# Patient Record
Sex: MALE | Race: WHITE | NOT HISPANIC OR LATINO | Employment: OTHER | URBAN - METROPOLITAN AREA
[De-identification: names, ages, dates, MRNs, and addresses within clinical notes are randomized per-mention and may not be internally consistent; named-entity substitution may affect disease eponyms.]

---

## 2017-01-16 DIAGNOSIS — I10 BENIGN ESSENTIAL HYPERTENSION: ICD-10-CM

## 2017-01-16 DIAGNOSIS — E78.00 HYPERCHOLESTEROLEMIA: ICD-10-CM

## 2017-01-16 LAB
ALT SERPL W P-5'-P-CCNC: 47 U/L (ref 0–70)
ANION GAP SERPL CALCULATED.3IONS-SCNC: 9 MMOL/L (ref 3–14)
BUN SERPL-MCNC: 22 MG/DL (ref 7–30)
CALCIUM SERPL-MCNC: 9 MG/DL (ref 8.5–10.1)
CHLORIDE SERPL-SCNC: 100 MMOL/L (ref 94–109)
CHOLEST SERPL-MCNC: 193 MG/DL
CO2 SERPL-SCNC: 30 MMOL/L (ref 20–32)
CREAT SERPL-MCNC: 1.38 MG/DL (ref 0.66–1.25)
GFR SERPL CREATININE-BSD FRML MDRD: 51 ML/MIN/1.7M2
GLUCOSE SERPL-MCNC: 98 MG/DL (ref 70–99)
HDLC SERPL-MCNC: 50 MG/DL
LDLC SERPL CALC-MCNC: 103 MG/DL
NONHDLC SERPL-MCNC: 143 MG/DL
POTASSIUM SERPL-SCNC: 3.8 MMOL/L (ref 3.4–5.3)
SODIUM SERPL-SCNC: 139 MMOL/L (ref 133–144)
TRIGL SERPL-MCNC: 198 MG/DL

## 2017-01-16 PROCEDURE — 36415 COLL VENOUS BLD VENIPUNCTURE: CPT | Performed by: FAMILY MEDICINE

## 2017-01-16 PROCEDURE — 84460 ALANINE AMINO (ALT) (SGPT): CPT | Performed by: FAMILY MEDICINE

## 2017-01-16 PROCEDURE — 80061 LIPID PANEL: CPT | Performed by: FAMILY MEDICINE

## 2017-01-16 PROCEDURE — 80048 BASIC METABOLIC PNL TOTAL CA: CPT | Performed by: FAMILY MEDICINE

## 2017-01-19 ENCOUNTER — OFFICE VISIT (OUTPATIENT)
Dept: FAMILY MEDICINE | Facility: OTHER | Age: 72
End: 2017-01-19
Attending: FAMILY MEDICINE
Payer: COMMERCIAL

## 2017-01-19 VITALS
TEMPERATURE: 97.2 F | BODY MASS INDEX: 24.64 KG/M2 | RESPIRATION RATE: 14 BRPM | SYSTOLIC BLOOD PRESSURE: 142 MMHG | HEART RATE: 76 BPM | HEIGHT: 67 IN | WEIGHT: 157 LBS | DIASTOLIC BLOOD PRESSURE: 80 MMHG

## 2017-01-19 DIAGNOSIS — E78.00 HYPERCHOLESTEROLEMIA: ICD-10-CM

## 2017-01-19 DIAGNOSIS — Z23 NEED FOR VACCINATION WITH 13-POLYVALENT PNEUMOCOCCAL CONJUGATE VACCINE: ICD-10-CM

## 2017-01-19 DIAGNOSIS — Z12.11 COLON CANCER SCREENING: ICD-10-CM

## 2017-01-19 DIAGNOSIS — I10 BENIGN ESSENTIAL HYPERTENSION: Primary | ICD-10-CM

## 2017-01-19 PROCEDURE — 99213 OFFICE O/P EST LOW 20 MIN: CPT | Performed by: FAMILY MEDICINE

## 2017-01-19 PROCEDURE — 99212 OFFICE O/P EST SF 10 MIN: CPT | Mod: 25

## 2017-01-19 PROCEDURE — 90670 PCV13 VACCINE IM: CPT | Performed by: FAMILY MEDICINE

## 2017-01-19 PROCEDURE — G0009 ADMIN PNEUMOCOCCAL VACCINE: HCPCS | Performed by: FAMILY MEDICINE

## 2017-01-19 ASSESSMENT — ANXIETY QUESTIONNAIRES

## 2017-01-19 ASSESSMENT — PATIENT HEALTH QUESTIONNAIRE - PHQ9: 5. POOR APPETITE OR OVEREATING: NOT AT ALL

## 2017-01-19 NOTE — PROGRESS NOTES
SUBJECTIVE:                                                    Ten Flores is a 71 year old male who presents to clinic today for the following health issues:      Hyperlipidemia Follow-Up      Rate your low fat/cholesterol diet?: good    Taking statin?  Yes, no muscle aches from statin    Other lipid medications/supplements?:  none     Hypertension Follow-up      Outpatient blood pressures are not being checked.    Low Salt Diet: no added salt       Amount of exercise or physical activity: generally active, through less during the really cold days    Problems taking medications regularly: No    Medication side effects: none    Diet: low salt      Problem list and histories reviewed & adjusted, as indicated.  Additional history: as documented    Patient Active Problem List   Diagnosis     Advanced care planning/counseling discussion     Benign essential hypertension     Hypercholesterolemia     Hip pain, right     Chronic rhinitis     Past Surgical History   Procedure Laterality Date     Open reduction and internal  fixation > left       Transplant       Genitourinary surgery       circ     Dermatology ip consult         Social History   Substance Use Topics     Smoking status: Former Smoker -- 5 years     Types: Cigarettes     Smokeless tobacco: Former User      Comment: Tried to Quit (YES)     Alcohol Use: 0.0 oz/week      Comment: DAILY,wine,beer     Family History   Problem Relation Age of Onset     HEART DISEASE Father 89     Disease - Cause of Death     HEART DISEASE Mother 68     Disease - Cause of Death         Current Outpatient Prescriptions   Medication Sig Dispense Refill     triamterene-hydrochlorothiazide (DYAZIDE) 37.5-25 MG per capsule Take 1 capsule by mouth daily 90 capsule 3     simvastatin (ZOCOR) 40 MG tablet Take 1 tablet (40 mg) by mouth At Bedtime 90 tablet 3     fluticasone (FLONASE) 50 MCG/ACT nasal spray Spray 1-2 sprays into both nostrils daily 16 g 11     acetaminophen (TYLENOL) 500  "MG tablet Take 2 tablets (1,000 mg) by mouth every 8 hours as needed 100 tablet 1     aspirin 81 MG tablet Take by mouth daily       HYDROcodone-acetaminophen (NORCO) 5-325 MG per tablet Take 1 tablet by mouth every 6 hours as needed for moderate to severe pain 10 tablet 0     fish oil-omega-3 fatty acids (FISH OIL) 1000 MG capsule Take 2 g by mouth daily        Allergies   Allergen Reactions     Sulfasuccinamide Sodium Hives     Sulfa Drugs Rash       ROS:  Constitutional, HEENT, cardiovascular, pulmonary, gi and gu systems are negative, except as otherwise noted.    OBJECTIVE:                                                    /80 mmHg  Pulse 76  Temp(Src) 97.2  F (36.2  C) (Tympanic)  Resp 14  Ht 5' 7\" (1.702 m)  Wt 157 lb (71.215 kg)  BMI 24.58 kg/m2  Body mass index is 24.58 kg/(m^2).  GENERAL: healthy, alert and no distress  PSYCH: mentation appears normal, affect normal/bright    Diagnostic Test Results:  Results for orders placed or performed in visit on 01/16/17   Basic metabolic panel   Result Value Ref Range    Sodium 139 133 - 144 mmol/L    Potassium 3.8 3.4 - 5.3 mmol/L    Chloride 100 94 - 109 mmol/L    Carbon Dioxide 30 20 - 32 mmol/L    Anion Gap 9 3 - 14 mmol/L    Glucose 98 70 - 99 mg/dL    Urea Nitrogen 22 7 - 30 mg/dL    Creatinine 1.38 (H) 0.66 - 1.25 mg/dL    GFR Estimate 51 (L) >60 mL/min/1.7m2    GFR Estimate If Black 61 >60 mL/min/1.7m2    Calcium 9.0 8.5 - 10.1 mg/dL   Lipid Profile   Result Value Ref Range    Cholesterol 193 <200 mg/dL    Triglycerides 198 (H) <150 mg/dL    HDL Cholesterol 50 >39 mg/dL    LDL Cholesterol Calculated 103 (H) <100 mg/dL    Non HDL Cholesterol 143 (H) <130 mg/dL   ALT   Result Value Ref Range    ALT 47 0 - 70 U/L        ASSESSMENT/PLAN:                                                    Hyperlipidemia; controlled   Plan:  No changes in the patient's current treatment plan    Hypertension; controlled   Associated with the following " complications:    None   Plan:  No changes in the patient's current treatment plan          ICD-10-CM    1. Benign essential hypertension I10    2. Hypercholesterolemia E78.00    3. Colon cancer screening Z12.11 Immunos occult blood   4. Need for vaccination with 13-polyvalent pneumococcal conjugate vaccine Z23 PNEUMOCOCCAL CONJ VACCINE 13 VALENT IM (PREVNAR 13)     ADMIN 1st VACCINE       FUTURE APPOINTMENTS:       - Follow-up visit in 6 months, labs prior to visit      Hodan Tapia MD  [facility]

## 2017-01-19 NOTE — NURSING NOTE
"Chief Complaint   Patient presents with     Recheck Medication     Patient had his labs drawn a couple of days ago.     Other     Patient would like a referral to ENT.       Initial /94 mmHg  Pulse 76  Temp(Src) 97.2  F (36.2  C) (Tympanic)  Resp 14  Ht 5' 7\" (1.702 m)  Wt 157 lb (71.215 kg)  BMI 24.58 kg/m2 Estimated body mass index is 24.58 kg/(m^2) as calculated from the following:    Height as of this encounter: 5' 7\" (1.702 m).    Weight as of this encounter: 157 lb (71.215 kg).  BP completed using cuff size: faisal Pal      "

## 2017-01-19 NOTE — MR AVS SNAPSHOT
After Visit Summary   1/19/2017    Ten Flores    MRN: [id number]           Patient Information     Date Of Birth          1945        Visit Information        Provider Department      1/19/2017 9:45 AM Hodan Tapia MD [facility]        Today's Diagnoses     Benign essential hypertension    -  1     Hypercholesterolemia         Colon cancer screening         Need for vaccination with 13-polyvalent pneumococcal conjugate vaccine            Follow-ups after your visit        Follow-up notes from your care team     Return in about 6 months (around 7/19/2017).      Your next 10 appointments already scheduled     Jul 20, 2017  9:45 AM   (Arrive by 9:30 AM)   SHORT with Hodan Tapia MD   [facility] ([facility] )    [address] [address]   [phone]              Future tests that were ordered for you today     Open Future Orders        Priority Expected Expires Ordered    Basic metabolic panel Routine 7/19/2017 1/19/2018 1/19/2017    Lipid Profile Routine 7/19/2017 1/19/2018 1/19/2017    ALT Routine 7/19/2017 1/19/2018 1/19/2017    Immunos occult blood Routine  1/19/2018 1/19/2017            Who to contact     If you have questions or need follow up information about today's clinic visit or your schedule please contact [facility] directly at [phone].  Normal or non-critical lab and imaging results will be communicated to you by MyChart, letter or phone within 4 business days after the clinic has received the results. If you do not hear from us within 7 days, please contact the clinic through MyChart or phone. If you have a critical or abnormal lab result, we will notify you by phone as soon as possible.  Submit refill requests through [employer] or call your pharmacy and they will forward the refill request to us. Please allow 3 business days for your refill to be completed.          Additional  "Information About Your Visit        MyChart Information     [employer] lets you send messages to your doctor, view your test results, renew your prescriptions, schedule appointments and more. To sign up, go to [email or web address] . Click on \"Log in\" on the left side of the screen, which will take you to the Welcome page. Then click on \"Sign up Now\" on the right side of the page.     You will be asked to enter the access code listed below, as well as some personal information. Please follow the directions to create your username and password.     Your access code is: [email or web address]  Expires: 2017 10:34 AM     Your access code will  in 90 days. If you need help or a new code, please call your [place] clinic or [phone].        Care EveryWhere ID     This is your Care EveryWhere ID. This could be used by other organizations to access your [place] medical records  [id number]        Your Vitals Were     Pulse Temperature Respirations Height BMI (Body Mass Index)       76 97.2  F (36.2  C) (Tympanic) 14 5' 7\" (1.702 m) 24.58 kg/m2        Blood Pressure from Last 3 Encounters:   17 142/80   10/04/16 140/84   16 148/80    Weight from Last 3 Encounters:   17 157 lb (71.215 kg)   10/04/16 160 lb 9.6 oz (72.848 kg)   16 167 lb (75.751 kg)              We Performed the Following     ADMIN 1st VACCINE     PNEUMOCOCCAL CONJ VACCINE 13 VALENT IM (PREVNAR 13)        Primary Care Provider Office Phone # Fax #    Hodan Tapia [phone]       [facility] [address] [place] [address]        Thank you!     Thank you for choosing [facility]  for your care. Our goal is always to provide you with excellent care. Hearing back from our patients is one way we can continue to improve our services. Please take a few minutes to complete the written survey that you may receive in the mail after your visit with us. Thank you!           "   Your Updated Medication List - Protect others around you: Learn how to safely use, store and throw away your medicines at www.disposemymeds.org.          This list is accurate as of: 1/19/17 10:34 AM.  Always use your most recent med list.                   Brand Name Dispense Instructions for use    acetaminophen 500 MG tablet    TYLENOL    100 tablet    Take 2 tablets (1,000 mg) by mouth every 8 hours as needed       aspirin 81 MG tablet      Take by mouth daily       fish oil-omega-3 fatty acids 1000 MG capsule      Take 2 g by mouth daily       fluticasone 50 MCG/ACT spray    FLONASE    16 g    Spray 1-2 sprays into both nostrils daily       HYDROcodone-acetaminophen 5-325 MG per tablet    NORCO    10 tablet    Take 1 tablet by mouth every 6 hours as needed for moderate to severe pain       simvastatin 40 MG tablet    ZOCOR    90 tablet    Take 1 tablet (40 mg) by mouth At Bedtime       triamterene-hydrochlorothiazide 37.5-25 MG per capsule    DYAZIDE    90 capsule    Take 1 capsule by mouth daily

## 2017-01-20 ASSESSMENT — ANXIETY QUESTIONNAIRES: GAD7 TOTAL SCORE: 0

## 2017-01-20 ASSESSMENT — PATIENT HEALTH QUESTIONNAIRE - PHQ9: SUM OF ALL RESPONSES TO PHQ QUESTIONS 1-9: 0

## 2017-01-23 ENCOUNTER — TELEPHONE (OUTPATIENT)
Dept: FAMILY MEDICINE | Facility: OTHER | Age: 72
End: 2017-01-23

## 2017-01-23 DIAGNOSIS — Z12.11 COLON CANCER SCREENING: ICD-10-CM

## 2017-01-23 DIAGNOSIS — J32.9 CHRONIC SINUSITIS, UNSPECIFIED LOCATION: Primary | ICD-10-CM

## 2017-01-23 LAB — HEMOCCULT SP1 STL QL: NEGATIVE

## 2017-01-23 PROCEDURE — 82274 ASSAY TEST FOR BLOOD FECAL: CPT | Performed by: FAMILY MEDICINE

## 2017-01-23 NOTE — TELEPHONE ENCOUNTER
Reason for call:  REFERRAL   1. Concern:  Dr Tapia was suppose to refer to ENT, he has not heard anything  2. Have you seen a provider for this concern? Yes  3. Who?   Dr. Tapia  4. When?  Last Week   5. What services are you requesting?   Referral to Ent  Description: Was Seen on the 19th, has not heard anything regarding an appt with ENT  Was an appointment offered for this a call? No  Preferred method for responding to this message: Telephone Call @ [phone]  If we cannot reach you directly, may we leave a detailed response at the number you provided? Yes  Can this message wait until your PCP/Provider returns if not available today?  Yes

## 2017-02-23 ENCOUNTER — OFFICE VISIT (OUTPATIENT)
Dept: OTOLARYNGOLOGY | Facility: OTHER | Age: 72
End: 2017-02-23
Attending: FAMILY MEDICINE
Payer: COMMERCIAL

## 2017-02-23 VITALS
HEIGHT: 67 IN | SYSTOLIC BLOOD PRESSURE: 142 MMHG | DIASTOLIC BLOOD PRESSURE: 76 MMHG | TEMPERATURE: 97.6 F | WEIGHT: 155 LBS | OXYGEN SATURATION: 97 % | HEART RATE: 62 BPM | BODY MASS INDEX: 24.33 KG/M2

## 2017-02-23 DIAGNOSIS — J31.0 CHRONIC RHINITIS: ICD-10-CM

## 2017-02-23 DIAGNOSIS — H93.13 TINNITUS, BILATERAL: ICD-10-CM

## 2017-02-23 DIAGNOSIS — J34.2 DNS (DEVIATED NASAL SEPTUM): ICD-10-CM

## 2017-02-23 DIAGNOSIS — H90.5 SNHL (SENSORINEURAL HEARING LOSS): Primary | ICD-10-CM

## 2017-02-23 DIAGNOSIS — F17.220 CHEWING TOBACCO NICOTINE DEPENDENCE WITHOUT COMPLICATION: ICD-10-CM

## 2017-02-23 DIAGNOSIS — J34.3 NASAL TURBINATE HYPERTROPHY: ICD-10-CM

## 2017-02-23 PROCEDURE — 31231 NASAL ENDOSCOPY DX: CPT | Performed by: OTOLARYNGOLOGY

## 2017-02-23 PROCEDURE — 99203 OFFICE O/P NEW LOW 30 MIN: CPT

## 2017-02-23 PROCEDURE — 99204 OFFICE O/P NEW MOD 45 MIN: CPT | Mod: 25 | Performed by: OTOLARYNGOLOGY

## 2017-02-23 PROCEDURE — 99214 OFFICE O/P EST MOD 30 MIN: CPT | Mod: 25

## 2017-02-23 ASSESSMENT — PAIN SCALES - GENERAL: PAINLEVEL: NO PAIN (0)

## 2017-02-23 NOTE — MR AVS SNAPSHOT
After Visit Summary   2/23/2017    Ten Flores    MRN: [id number]           Patient Information     Date Of Birth          1945        Visit Information        Provider Department      2/23/2017 8:15 AM Vanessa Hernandez MD [facility]        Today's Diagnoses     Chronic sinusitis, unspecified location          Care Instructions    Thank you for allowing Dr. Hernandez and our ENT team to participate in your care.  If you have a scheduling or an appointment question please contact [facility] Unit Coordinator at their direct line [phone].   ALL nursing questions or concerns can be directed to your ENT nurse at: [phone] - [place]    Continue Zyrtec  Use Budesonide Rinse for 2 months  Increase Water Intake  Complete Audiogram  Follow up with ALMITA Camp after Allergy Test      Indications for allergy testing include:   1) Confirm suspicion of allergic rhinitis due to inhalant allergies  2) Identify the offending allergen to determine specific mode of treatment  3) In the case of chronic rhinosinusitis: when symptoms are not controlled by avoidance and pharmacotherapy  4) In the Asthma patient when exacerbations may be due to perennial allergen exposure  5) Suspect food allergy  6) Otitis Media, chronic rhinitis, atopic dermatitis, Meniere disease, headache, pharyngitis or eye symptoms    modified quantitative testing (MQT) will be performed.  Signed consent was obtained, and the risks of immunotherapy were discussed, including the potential for anaphylaxis.    If immunotherapy (IT) is recommended, there is continued risk of anaphylaxis.   Anaphylaxis can cause death. The patient will need to be monitored for 30 minutes post injection.  They must present their epinephrine pen prior to injection.  Subcutaneous as well as sublingual immunotherapy (SLIT) were discussed as potential treatment options.  The patient was told SLIT is not approved by the FDA and is cash  "pay.  The general time frame of immunotherapy was discussed (generally 3-5 years, sometimes longer), and the basic immunology behind IT was discussed.          Follow-ups after your visit        Your next 10 appointments already scheduled     2017  9:45 AM CDT   (Arrive by 9:30 AM)   SHORT with Hodan Tapia MD   [facility] ([facility] )    [address]  [facility] [address]   [phone]              Who to contact     If you have questions or need follow up information about today's clinic visit or your schedule please contact [facility] directly at [phone].  Normal or non-critical lab and imaging results will be communicated to you by MyChart, letter or phone within 4 business days after the clinic has received the results. If you do not hear from us within 7 days, please contact the clinic through [employer] or phone. If you have a critical or abnormal lab result, we will notify you by phone as soon as possible.  Submit refill requests through [employer] or call your pharmacy and they will forward the refill request to us. Please allow 3 business days for your refill to be completed.          Additional Information About Your Visit        [employer] Information     [employer] lets you send messages to your doctor, view your test results, renew your prescriptions, schedule appointments and more. To sign up, go to [email or web address] . Click on \"Log in\" on the left side of the screen, which will take you to the Welcome page. Then click on \"Sign up Now\" on the right side of the page.     You will be asked to enter the access code listed below, as well as some personal information. Please follow the directions to create your username and password.     Your access code is: [email or web address]  Expires: 2017 10:34 AM     Your access code will  in 90 days. If you need help or a new code, please call your [facility] or [phone].        Care " "EveryWhere ID     This is your Care EveryWhere ID. This could be used by other organizations to access your [place] medical records  [id number]        Your Vitals Were     Pulse Temperature Height Pulse Oximetry BMI (Body Mass Index)       62 97.6  F (36.4  C) (Tympanic) 5' 6.5\" (1.689 m) 97% 24.64 kg/m2        Blood Pressure from Last 3 Encounters:   02/23/17 142/76   01/19/17 142/80   10/04/16 140/84    Weight from Last 3 Encounters:   02/23/17 155 lb (70.3 kg)   01/19/17 157 lb (71.2 kg)   10/04/16 160 lb 9.6 oz (72.8 kg)              Today, you had the following     No orders found for display       Primary Care Provider Office Phone # Fax #    Hodan Tapia [phone]       [address] [address]        Thank you!     Thank you for choosing [facility]  for your care. Our goal is always to provide you with excellent care. Hearing back from our patients is one way we can continue to improve our services. Please take a few minutes to complete the written survey that you may receive in the mail after your visit with us. Thank you!             Your Updated Medication List - Protect others around you: Learn how to safely use, store and throw away your medicines at www.disposemymeds.org.          This list is accurate as of: 2/23/17  8:44 AM.  Always use your most recent med list.                   Brand Name Dispense Instructions for use    acetaminophen 500 MG tablet    TYLENOL    100 tablet    Take 2 tablets (1,000 mg) by mouth every 8 hours as needed       aspirin 81 MG tablet      Take by mouth daily       fish oil-omega-3 fatty acids 1000 MG capsule      Take 2 g by mouth daily       fluticasone 50 MCG/ACT spray    FLONASE    16 g    Spray 1-2 sprays into both nostrils daily       HYDROcodone-acetaminophen 5-325 MG per tablet    NORCO    10 tablet    Take 1 tablet by mouth every 6 hours as needed for moderate to severe pain "       simvastatin 40 MG tablet    ZOCOR    90 tablet    Take 1 tablet (40 mg) by mouth At Bedtime       triamterene-hydrochlorothiazide 37.5-25 MG per capsule    DYAZIDE    90 capsule    Take 1 capsule by mouth daily

## 2017-02-23 NOTE — PATIENT INSTRUCTIONS
Thank you for allowing Dr. Hernandez and our ENT team to participate in your care.  If you have a scheduling or an appointment question please contact [facility] Unit Coordinator at their direct line [phone].   ALL nursing questions or concerns can be directed to your ENT nurse at: [phone] Elyse Adeola    Continue Zyrtec  Use Budesonide Rinse for 2 months  Increase Water Intake  Complete Audiogram  Follow up with ALMITA Camp after Allergy Test      Indications for allergy testing include:   1) Confirm suspicion of allergic rhinitis due to inhalant allergies  2) Identify the offending allergen to determine specific mode of treatment  3) In the case of chronic rhinosinusitis: when symptoms are not controlled by avoidance and pharmacotherapy  4) In the Asthma patient when exacerbations may be due to perennial allergen exposure  5) Suspect food allergy  6) Otitis Media, chronic rhinitis, atopic dermatitis, Meniere disease, headache, pharyngitis or eye symptoms    modified quantitative testing (MQT) will be performed.  Signed consent was obtained, and the risks of immunotherapy were discussed, including the potential for anaphylaxis.    If immunotherapy (IT) is recommended, there is continued risk of anaphylaxis.   Anaphylaxis can cause death. The patient will need to be monitored for 30 minutes post injection.  They must present their epinephrine pen prior to injection.  Subcutaneous as well as sublingual immunotherapy (SLIT) were discussed as potential treatment options.  The patient was told SLIT is not approved by the FDA and is cash pay.  The general time frame of immunotherapy was discussed (generally 3-5 years, sometimes longer), and the basic immunology behind IT was discussed.

## 2017-02-23 NOTE — PROGRESS NOTES
Otolaryngology Consultation    Patient: Ten Flores  : 1945    Patient presents with:  Consult: chronic sinusitis - referred by [facility].       HPI:  Ten Flores is a 71 year old male  I was asked to see for evaluation of chronic sinusitis. He has had 6 months of chronic post nasal drainage since tearing up carpeting out of his basement.  No improvement despite zyrtec and flonase use  Symptoms typically include post nasal drip, he denies congestion, facial pressure or anosmia     Ten denies prior nasal surgery or trauma.    He denies any allergy testing and has occasional symptoms of sneezing or itchy eyes.      He denies heartburn or other GI complaints    Quit rare use of smoking tobacco over 20 years ago  Chewing tobacco, 50 pack yr history quit 6 ys ago    Denies otalgia or weight loss    Also notes bilateral tinnitus, not causing undue stress, no flux HL or vertigo  Years of noise exposure to heavy equipment    Current Outpatient Rx   Medication Sig Dispense Refill     triamterene-hydrochlorothiazide (DYAZIDE) 37.5-25 MG per capsule Take 1 capsule by mouth daily 90 capsule 3     simvastatin (ZOCOR) 40 MG tablet Take 1 tablet (40 mg) by mouth At Bedtime 90 tablet 3     fluticasone (FLONASE) 50 MCG/ACT nasal spray Spray 1-2 sprays into both nostrils daily 16 g 11     acetaminophen (TYLENOL) 500 MG tablet Take 2 tablets (1,000 mg) by mouth every 8 hours as needed 100 tablet 1     HYDROcodone-acetaminophen (NORCO) 5-325 MG per tablet Take 1 tablet by mouth every 6 hours as needed for moderate to severe pain 10 tablet 0     aspirin 81 MG tablet Take by mouth daily       fish oil-omega-3 fatty acids (FISH OIL) 1000 MG capsule Take 2 g by mouth daily          Allergies: Sulfasuccinamide sodium and Sulfa drugs     Past Medical History   Diagnosis Date     Benign essential hypertension 3/16/2016     Hip pain, right 2016     Hypercholesterolemia 3/16/2016       Past Surgical History   Procedure  "Laterality Date     Open reduction and internal  fixation > left       Transplant       Genitourinary surgery       circ     Dermatology ip consult         ENT family history reviewed    Social History   Substance Use Topics     Smoking status: Former Smoker     Years: 5.00     Types: Cigarettes     Smokeless tobacco: Former User      Comment: Tried to Quit (YES)     Alcohol use 0.0 oz/week      Comment: DAILY,wine,beer       Review of Systems  ROS: 10 point ROS neg other than the symptoms noted above in the HPI     Physical Exam  /76 (BP Location: Right arm, Patient Position: Chair, Cuff Size: Adult Regular)  Pulse 62  Temp 97.6  F (36.4  C) (Tympanic)  Ht 5' 6.5\" (1.689 m)  Wt 155 lb (70.3 kg)  SpO2 97%  BMI 24.64 kg/m2  General - The patient is well nourished and well developed, and appears to have good nutritional status.  Alert and oriented to person and place, answers questions and cooperates with examination appropriately.   Head and Face - Normocephalic and atraumatic, with no gross asymmetry noted.  The facial nerve is intact, with strong symmetric movements.  Voice and Breathing - The patient was breathing comfortably without the use of accessory muscles. There was no wheezing, stridor, or stertor.  The patients voice was clear and strong, and had appropriate pitch and quality.  Ears -The external auditory canals are patent, the tympanic membranes are intact without effusion, retraction or mass.  Bony landmarks are intact.  Eyes - Extraocular movements intact, and the pupils were reactive to light.  Sclera were not icteric or injected, conjunctiva were pink and moist.  Mouth - Examination of the oral cavity showed pink, healthy oral mucosa. No lesions or ulcerations noted.  The tongue was mobile and midline. Bilateral torus mandibularis  Throat - The walls of the oropharynx were smooth, pink, moist, symmetric, and had no lesions or ulcerations.  The tonsillar pillars and soft palate were symmetric.  " The uvula was midline on elevation.  Grade 2 tonsils bilaterally  Neck - Normal midline excursion of the laryngotracheal complex during swallowing.  Full range of motion on passive movement.  Palpation of the occipital, submental, submandibular, internal jugular chain, and supraclavicular nodes did not demonstrate any abnormal lymph nodes or masses.  Palpation of the thyroid was soft and smooth, with no nodules or goiter appreciated.  The trachea was mobile and midline.  Nose - External contour is asymmetric,  Nasal mucosa is pink and moist with no abnormal mucus.  The septum was intact  Caudal deflection right, remainder septum deviated left with contact, turbinates are enlarged.  No polyps, masses, or purulence noted on examination.      The nasal endoscope was inserted into the right naris.  The inferior turbinate was boggy.  There was no discharge seen in the middle meatus but narrowed due to irregular septum, no manasa disease frontal or sphenoid recess.  There  were no polyps seen in the middle meatus, frontal or sphenoid recesses.       The nasal endoscope was then inserted in the left naris.  The nasal mucosa appeared normal.  The inferior turbinate was boggy.  There was no discharge seen in the middle meatus, frontal or sphenoid recesses.  There were no polyps seen in the middle meatus, frontal or sphenoid recess.      NP clear  ET patent    CT sinuses reviewed with Ulises and his wife: neg aside from DNS, ITH, akash      Impression:      ICD-10-CM    1. SNHL (sensorineural hearing loss) H90.5 AUDIOLOGY ADULT REFERRAL   2. Tinnitus H93.19 AUDIOLOGY ADULT REFERRAL   3. Chronic rhinitis J31.0    4. DNS (deviated nasal septum) J34.2    5. Nasal turbinate hypertrophy J34.3    6. h/o Chewing tobacco nicotine dependence F17.220          Plan:        Continue Zyrtec  Use Budesonide Rinse for 2 months  Increase Water Intake  Complete Audiogram  Follow up with ALMITA Camp after Allergy Test      Indications for  allergy testing include:   1) Confirm suspicion of allergic rhinitis due to inhalant allergies  2) Identify the offending allergen to determine specific mode of treatment  3) In the case of chronic rhinosinusitis: when symptoms are not controlled by avoidance and pharmacotherapy  4) In the Asthma patient when exacerbations may be due to perennial allergen exposure  5) Suspect food allergy  6) Otitis Media, chronic rhinitis, atopic dermatitis, Meniere disease, headache, pharyngitis or eye symptoms    modified quantitative testing (MQT) will be performed.  Signed consent was obtained, and the risks of immunotherapy were discussed, including the potential for anaphylaxis.    If immunotherapy (IT) is recommended, there is continued risk of anaphylaxis.   Anaphylaxis can cause death. The patient will need to be monitored for 30 minutes post injection.  They must present their epinephrine pen prior to injection.  Subcutaneous as well as sublingual immunotherapy (SLIT) were discussed as potential treatment options.  The patient was told SLIT is not approved by the FDA and is cash pay.  The general time frame of immunotherapy was discussed (generally 3-5 years, sometimes longer), and the basic immunology behind IT was discussed.    Vanessa Hernandez D.O.  Otolaryngology/Head and Neck Surgery  Allergy

## 2017-02-24 ENCOUNTER — TELEPHONE (OUTPATIENT)
Dept: ALLERGY | Facility: OTHER | Age: 72
End: 2017-02-24

## 2017-02-24 NOTE — TELEPHONE ENCOUNTER
I spoke with the patient today regarding allergy skin testing.  All general instructions are reviewed with the patient.  He is aware of all medications that need to be held prior to testing.  The patient will stop medications as directed per instructions.  He verbalized understanding and agree with plan.      An appointment will be arranged by the ENT [facility] for testing date and time. This message is forwarded to the [facility] to arrange for an appointment. Written instructions are mailed by the ENT [facility].  Mary Wolfe RN

## 2017-03-03 DIAGNOSIS — E78.00 HYPERCHOLESTEROLEMIA: ICD-10-CM

## 2017-03-03 DIAGNOSIS — I10 BENIGN ESSENTIAL HYPERTENSION: ICD-10-CM

## 2017-03-03 RX ORDER — TRIAMTERENE AND HYDROCHLOROTHIAZIDE 37.5; 25 MG/1; MG/1
1 CAPSULE ORAL DAILY
Qty: 90 CAPSULE | Refills: 0 | Status: SHIPPED | OUTPATIENT
Start: 2017-03-03 | End: 2017-05-28

## 2017-03-03 RX ORDER — SIMVASTATIN 40 MG
40 TABLET ORAL AT BEDTIME
Qty: 90 TABLET | Refills: 0 | Status: SHIPPED | OUTPATIENT
Start: 2017-03-03 | End: 2017-05-28

## 2017-03-09 ENCOUNTER — OFFICE VISIT (OUTPATIENT)
Dept: OTOLARYNGOLOGY | Facility: OTHER | Age: 72
End: 2017-03-09
Attending: OTOLARYNGOLOGY
Payer: COMMERCIAL

## 2017-03-09 ENCOUNTER — OFFICE VISIT (OUTPATIENT)
Dept: ALLERGY | Facility: OTHER | Age: 72
End: 2017-03-09
Attending: OTOLARYNGOLOGY
Payer: COMMERCIAL

## 2017-03-09 VITALS
DIASTOLIC BLOOD PRESSURE: 85 MMHG | BODY MASS INDEX: 23.34 KG/M2 | OXYGEN SATURATION: 94 % | HEART RATE: 74 BPM | SYSTOLIC BLOOD PRESSURE: 146 MMHG | HEIGHT: 68 IN | TEMPERATURE: 98.1 F | WEIGHT: 154 LBS

## 2017-03-09 VITALS
HEIGHT: 68 IN | WEIGHT: 154 LBS | HEART RATE: 74 BPM | BODY MASS INDEX: 23.34 KG/M2 | TEMPERATURE: 98.1 F | OXYGEN SATURATION: 94 % | DIASTOLIC BLOOD PRESSURE: 85 MMHG | SYSTOLIC BLOOD PRESSURE: 146 MMHG

## 2017-03-09 DIAGNOSIS — J31.0 CHRONIC RHINITIS: ICD-10-CM

## 2017-03-09 DIAGNOSIS — J31.0 CHRONIC RHINITIS: Primary | ICD-10-CM

## 2017-03-09 PROCEDURE — 86003 ALLG SPEC IGE CRUDE XTRC EA: CPT | Performed by: PHYSICIAN ASSISTANT

## 2017-03-09 PROCEDURE — 99000 SPECIMEN HANDLING OFFICE-LAB: CPT | Performed by: PHYSICIAN ASSISTANT

## 2017-03-09 PROCEDURE — 36415 COLL VENOUS BLD VENIPUNCTURE: CPT | Performed by: PHYSICIAN ASSISTANT

## 2017-03-09 PROCEDURE — 95024 IQ TESTS W/ALLERGENIC XTRCS: CPT | Mod: TC

## 2017-03-09 PROCEDURE — 82785 ASSAY OF IGE: CPT | Performed by: PHYSICIAN ASSISTANT

## 2017-03-09 PROCEDURE — 95004 PERQ TESTS W/ALRGNC XTRCS: CPT | Mod: TC

## 2017-03-09 ASSESSMENT — PAIN SCALES - GENERAL
PAINLEVEL: NO PAIN (0)
PAINLEVEL: NO PAIN (0)

## 2017-03-09 NOTE — NURSING NOTE
"Chief Complaint   Patient presents with     Other     Allergy Skin Test       Initial /85 (BP Location: Right arm, Cuff Size: Adult Regular)  Pulse 74  Temp 98.1  F (36.7  C) (Tympanic)  Ht 5' 7.5\" (1.715 m)  Wt 154 lb (69.9 kg)  SpO2 94%  BMI 23.76 kg/m2 Estimated body mass index is 23.76 kg/(m^2) as calculated from the following:    Height as of this encounter: 5' 7.5\" (1.715 m).    Weight as of this encounter: 154 lb (69.9 kg).  Medication Reconciliation: complete     This patient presents today for allergy skin testing.      Symptoms have included sinus drainage and sinus headaches, and patient does not notice it being worse any particular season.    This patient lives in a older single family home, with a basement.  This patient does not suspect mold, water or moisture issues in the home.  There is carpet in the home, and is not in bedroom.  Home has gas forced air heat and does have air conditioning.        This patient has a cat and a dog for pets.  They are both inside and are on the furniture and sleep in patient's bed.    This patient has not had allergy testing in the past.    This patient's medications have been reviewed prior to testing and all appropriate medications have been stopped.    Consent is signed by patient and signature is verified.     MQT test is performed per protocol.  The patient did not react at all to the MQT portion of the test.  All findings are recorded on the paper flow sheet. Results are reviewed with this patient.  They are given written information regarding allergy.       The patient will follow-up with Kia BURGOS for treatment plan.      Michelle Arroyo RN      "

## 2017-03-09 NOTE — PROGRESS NOTES
Skin testing was not completed today.  See note in Kia Cobb encounter.  Patient will return on 3/21/17 to follow up with Kia Cobb.     Michelle Arroyo RN

## 2017-03-09 NOTE — PROGRESS NOTES
MQT test is performed per protocol.  The patient did not respond to MQT portion of the test on his back.  All findings on his back were negative including the positive and negative control and recorded as #2.  Intradermals positive and negative control were placed on his right arm after 10 minutes of observation histamine is 11 mm and glycerine is 4 mm.  Kia Cobb came to see the patient and advised no further testing to be done due to negative findings on his back.  The patient will have environment RAST and total IGE today.  He will not see Kia Cobb.  He will return on 3/14/17 for Audio and 3/21/17 to follow up with Kia to discuss RAST results and further plan of care.  This encounter is for documentation only.    Michelle Arroyo RN

## 2017-03-09 NOTE — MR AVS SNAPSHOT
After Visit Summary   3/9/2017    Ten Flores    MRN: [id number]           Patient Information     Date Of Birth          1945        Visit Information        Provider Department      3/9/2017 1:30 PM HC ALLERGY TESTING [facility]        Today's Diagnoses     Chronic rhinitis           Follow-ups after your visit        Your next 10 appointments already scheduled     Mar 09, 2017  4:00 PM CST   (Arrive by 3:45 PM)   Return Visit with Kia Cobb PA-C   [facility] [place] [facility] [facility]    [address]  [place] [address]   [phone]            Mar 14, 2017  2:30 PM CDT   (Arrive by 2:15 PM)   Hearing Eval with Sammi Morales   [facility] [place] ([facility])    [address]  [place] [address]   [phone]            Mar 21, 2017  1:45 PM CDT   (Arrive by 1:30 PM)   Return Visit with Kia Cobb PA-C   [facility] [place] ([facility] [facility])    [address]  [place] [address]   [phone]            Jul 20, 2017  9:45 AM CDT   (Arrive by 9:30 AM)   SHORT with Hodan Tapia MD   [facility]    [address] [facility] [address]   [phone]              Who to contact     If you have questions or need follow up information about today's clinic visit or your schedule please contact [facility] directly at [phone].  Normal or non-critical lab and imaging results will be communicated to you by MyChart, letter or phone within 4 business days after the clinic has received the results. If you do not hear from us within 7 days, please contact the clinic through spotdockhart or phone. If you have a critical or abnormal lab result, we will notify you by phone as soon as possible.  Submit refill requests through [employer] or call your pharmacy and they will forward the refill request to us. Please allow 3 business days for your refill to be completed.           "Additional Information About Your Visit        MyChart Information     Dog[employer]oin lets you send messages to your doctor, view your test results, renew your prescriptions, schedule appointments and more. To sign up, go to [email or web address] . Click on \"Log in\" on the left side of the screen, which will take you to the Welcome page. Then click on \"Sign up Now\" on the right side of the page.     You will be asked to enter the access code listed below, as well as some personal information. Please follow the directions to create your username and password.     Your access code is: [email or web address]  Expires: 2017 10:34 AM     Your access code will  in 90 days. If you need help or a new code, please call your [place] clinic or [phone].        Care EveryWhere ID     This is your Care EveryWhere ID. This could be used by other organizations to access your [place] medical records  [id number]        Your Vitals Were     Pulse Temperature Height Pulse Oximetry BMI (Body Mass Index)       74 98.1  F (36.7  C) (Tympanic) 5' 7.5\" (1.715 m) 94% 23.76 kg/m2        Blood Pressure from Last 3 Encounters:   17 146/85   17 142/76   17 142/80    Weight from Last 3 Encounters:   17 154 lb (69.9 kg)   17 155 lb (70.3 kg)   17 157 lb (71.2 kg)              Today, you had the following     No orders found for display       Primary Care Provider Office Phone # Fax #    Hodan Tapia [phone]       [address] [address]        Thank you!     Thank you for choosing [facility]  for your care. Our goal is always to provide you with excellent care. Hearing back from our patients is one way we can continue to improve our services. Please take a few minutes to complete the written survey that you may receive in the mail after your visit with us. Thank you!             Your Updated Medication List - Protect " others around you: Learn how to safely use, store and throw away your medicines at www.disposemymeds.org.          This list is accurate as of: 3/9/17  2:26 PM.  Always use your most recent med list.                   Brand Name Dispense Instructions for use    acetaminophen 500 MG tablet    TYLENOL    100 tablet    Take 2 tablets (1,000 mg) by mouth every 8 hours as needed       aspirin 81 MG tablet      Take by mouth daily       budesonide 32 MCG/ACT spray    RINOCORT AQUA    1 Bottle    Spray 2 sprays into both nostrils daily       fish oil-omega-3 fatty acids 1000 MG capsule      Take 2 g by mouth daily       HYDROcodone-acetaminophen 5-325 MG per tablet    NORCO    10 tablet    Take 1 tablet by mouth every 6 hours as needed for moderate to severe pain       simvastatin 40 MG tablet    ZOCOR    90 tablet    Take 1 tablet (40 mg) by mouth At Bedtime       triamterene-hydrochlorothiazide 37.5-25 MG per capsule    DYAZIDE    90 capsule    Take 1 capsule by mouth daily

## 2017-03-09 NOTE — MR AVS SNAPSHOT
After Visit Summary   3/9/2017    Ten Flores    MRN: [id number]           Patient Information     Date Of Birth          1945        Visit Information        Provider Department      3/9/2017 4:00 PM Kia Cobb PA-C [facility]        Today's Diagnoses     Chronic rhinitis    -  1       Follow-ups after your visit        Your next 10 appointments already scheduled     Mar 09, 2017  4:00 PM CST   (Arrive by 3:45 PM)   Return Visit with Kia Cobb PA-C   [facility] [place] ([facility] [place] [facility]    [address]  [place] [address]   [phone]            Mar 14, 2017  2:30 PM CDT   (Arrive by 2:15 PM)   Hearing Eval with aSmmi Morales   [facility] [place] ([facility]    [address]  [place] [address]   [phone]            Mar 21, 2017  1:45 PM CDT   (Arrive by 1:30 PM)   Return Visit with Kia Cobb PA-C   [facility] [place] [facility] [place] [facility]    [address]  [place] [address]   [phone]            Jul 20, 2017  9:45 AM CDT   (Arrive by 9:30 AM)   SHORT with Hodan Tapia MD   [facility] ([facility]    [address] [address] [facility] [address]   [phone]              Who to contact     If you have questions or need follow up information about today's clinic visit or your schedule please contact [facility] directly at [phone].  Normal or non-critical lab and imaging results will be communicated to you by MyChart, letter or phone within 4 business days after the clinic has received the results. If you do not hear from us within 7 days, please contact the clinic through [employer] Technologieshart or phone. If you have a critical or abnormal lab result, we will notify you by phone as soon as possible.  Submit refill requests through [employer] Mail or call your pharmacy and they will forward the refill request to us. Please allow 3 business days for your refill to be completed.           "Additional Information About Your Visit        MyChart Information     S[employer] Green lets you send messages to your doctor, view your test results, renew your prescriptions, schedule appointments and more. To sign up, go to [email or web address] . Click on \"Log in\" on the left side of the screen, which will take you to the Welcome page. Then click on \"Sign up Now\" on the right side of the page.     You will be asked to enter the access code listed below, as well as some personal information. Please follow the directions to create your username and password.     Your access code is: [email or web address]  Expires: 2017 10:34 AM     Your access code will  in 90 days. If you need help or a new code, please call your [place] clinic or [phone].        Care EveryWhere ID     This is your Care EveryWhere ID. This could be used by other organizations to access your [place] medical records  [id number]        Your Vitals Were     Pulse Temperature Height Pulse Oximetry BMI (Body Mass Index)       74 98.1  F (36.7  C) (Tympanic) 5' 7.5\" (1.715 m) 94% 23.76 kg/m2        Blood Pressure from Last 3 Encounters:   17 146/85   17 146/85   17 142/76    Weight from Last 3 Encounters:   17 154 lb (69.9 kg)   17 154 lb (69.9 kg)   17 155 lb (70.3 kg)              We Performed the Following     Allergen alternaria alternata IgE     Allergen amelia white IgE     Allergen aspergillus fumigatus IgE     Allergen cat epithellium IgE     Allergen Cedar IgE     Allergen cladosporium herbarum IgE     Allergen Cockroach American     Allergen Cockroach Macedonian     Allergen cottonwood/aspen IgE     Allergen D farinae IgE     Allergen D pteronyssinus IgE     Allergen dog epithelium IgE     Allergen Epicoccum purpurascens IgE     Allergen giant ragweed IgE     Allergen Goose Feathers IgE     Allergen helminthosporium halodes IgE     Allergen horse dander IgE     Allergen maple box elder IgE     Allergen Mucor " racemosus IgE     Allergen Mugwort IgE     Allergen oak white IgE     Allergen penicillium notatum IgE     Allergen ragweed short IgE     Allergen Red Kanorado IgE     Allergen Rhizopus nigricans IgE     Allergen Sagebrush Wormwood IgE     Allergen Sheep Sorrel IgE     Allergen silver  birch IgE     Allergen thistle Russian IgE     Allergen darek IgE     Allergen white pine IgE     Allergen Yellow Dock IgE     Allergen, Kochia/Firebush     Allergen, Pigweed     IgE        Primary Care Provider Office Phone # Fax #    Hodannacho Tapia [phone]       [facility] [address] [place] [address]        Thank you!     Thank you for choosing [facility]  for your care. Our goal is always to provide you with excellent care. Hearing back from our patients is one way we can continue to improve our services. Please take a few minutes to complete the written survey that you may receive in the mail after your visit with us. Thank you!             Your Updated Medication List - Protect others around you: Learn how to safely use, store and throw away your medicines at www.disposemymeds.org.          This list is accurate as of: 3/9/17  2:52 PM.  Always use your most recent med list.                   Brand Name Dispense Instructions for use    acetaminophen 500 MG tablet    TYLENOL    100 tablet    Take 2 tablets (1,000 mg) by mouth every 8 hours as needed       aspirin 81 MG tablet      Take by mouth daily       budesonide 32 MCG/ACT spray    RINOCORT AQUA    1 Bottle    Spray 2 sprays into both nostrils daily       fish oil-omega-3 fatty acids 1000 MG capsule      Take 2 g by mouth daily       HYDROcodone-acetaminophen 5-325 MG per tablet    NORCO    10 tablet    Take 1 tablet by mouth every 6 hours as needed for moderate to severe pain       simvastatin 40 MG tablet    ZOCOR    90 tablet    Take 1 tablet (40 mg) by mouth At Bedtime        triamterene-hydrochlorothiazide 37.5-25 MG per capsule    DYAZIDE    90 capsule    Take 1 capsule by mouth daily

## 2017-03-13 LAB
A ALTERNATA IGE QN: NORMAL KU(A)/L
A FUMIGATUS IGE QN: NORMAL KU(A)/L
AMER ROACH IGE QN: NORMAL KU(A)/L
C HERBARUM IGE QN: NORMAL KU(A)/L
CAT DANDER IGG QN: NORMAL KU(A)/L
CEDAR IGE QN: NORMAL KU(A)/L
COMMON RAGWEED IGE QN: NORMAL KU(A)/L
COTTONWOOD IGE QN: NORMAL KU(A)/L
D FARINAE IGE QN: NORMAL KU(A)/L
D PTERONYSS IGE QN: NORMAL KU(A)/L
DEPRECATED IGE QN: NORMAL KU(A)/L
DOG DANDER+EPITH IGE QN: NORMAL KU(A)/L
E PURPURASCENS IGE QN: NORMAL KU(A)/L
EAST WHITE PINE IGE QN: NORMAL KU(A)/L
FIREBUSH IGE QN: NORMAL KU(A)/L
GIANT RAGWEED IGE QN: NORMAL KU(A)/L
GOOSE FEATHER IGE QN: NORMAL KU(A)/L
HORSE DANDER IGE QN: NORMAL KU(A)/L
IGE SERPL-ACNC: 53 KIU/L (ref 0–114)
M RACEMOSUS IGE QN: NORMAL KU(A)/L
MAPLE IGE QN: NORMAL KU(A)/L
MUGWORT IGE QN: NORMAL KU(A)/L
P NOTATUM IGE QN: NORMAL KU(A)/L
R NIGRICANS IGE QN: NORMAL KU(A)/L
RED MULBERRY IGE QN: NORMAL KU(A)/L
ROACH IGE QN: NORMAL KU(A)/L
S ROSTRATA IGE QN: NORMAL KU(A)/L
SALTWORT IGE QN: NORMAL KU(A)/L
SHEEP SORREL IGE QN: NORMAL KU(A)/L
SILVER BIRCH IGE QN: NORMAL KU(A)/L
TIMOTHY IGE QN: NORMAL KU(A)/L
WHITE ASH IGE QN: NORMAL KU(A)/L
WHITE OAK IGE QN: NORMAL KU(A)/L
WORMWOOD IGE QN: NORMAL KU(A)/L
YELLOW DOCK IGE QN: NORMAL KU(A)/L

## 2017-03-14 ENCOUNTER — OFFICE VISIT (OUTPATIENT)
Dept: AUDIOLOGY | Facility: OTHER | Age: 72
End: 2017-03-14
Attending: OTOLARYNGOLOGY
Payer: COMMERCIAL

## 2017-03-14 DIAGNOSIS — H93.13 TINNITUS, BILATERAL: ICD-10-CM

## 2017-03-14 DIAGNOSIS — R42 DIZZINESS: ICD-10-CM

## 2017-03-14 DIAGNOSIS — H90.3 SENSORINEURAL HEARING LOSS, BILATERAL: ICD-10-CM

## 2017-03-14 PROCEDURE — 92570 ACOUSTIC IMMITANCE TESTING: CPT | Performed by: AUDIOLOGIST

## 2017-03-14 PROCEDURE — 92557 COMPREHENSIVE HEARING TEST: CPT | Performed by: AUDIOLOGIST

## 2017-03-14 NOTE — MR AVS SNAPSHOT
"              After Visit Summary   3/14/2017    Ten Flores    MRN: [id number]           Patient Information     Date Of Birth          1945        Visit Information        Provider Department      3/14/2017 2:30 PM Sadie Romero AuD [facility]        Today's Diagnoses     Tinnitus, bilateral        Dizziness        Sensorineural hearing loss, bilateral           Follow-ups after your visit        Your next 10 appointments already scheduled     Apr 07, 2017 10:30 AM CDT   (Arrive by 10:15 AM)   Hearing Aid Initial with Sammi Morales   [facility] ([facility]    [address]  [facility] [address]   [phone]            Jul 20, 2017  9:45 AM CDT   (Arrive by 9:30 AM)   SHORT with Hodan Tapia MD   [facility] ([facility] )    [address] [facility] [address]   [phone]              Who to contact     If you have questions or need follow up information about today's clinic visit or your schedule please contact [facility] directly at [phone].  Normal or non-critical lab and imaging results will be communicated to you by [employer], letter or phone within 4 business days after the clinic has received the results. If you do not hear from us within 7 days, please contact the clinic through [employer] or phone. If you have a critical or abnormal lab result, we will notify you by phone as soon as possible.  Submit refill requests through [employer] or call your pharmacy and they will forward the refill request to us. Please allow 3 business days for your refill to be completed.          Additional Information About Your Visit        MyChart Information     [employer] lets you send messages to your doctor, view your test results, renew your prescriptions, schedule appointments and more. To sign up, go to [email or web address] . Click on \"Log in\" on the left side of the screen, which will take you to the " "Welcome page. Then click on \"Sign up Now\" on the right side of the page.     You will be asked to enter the access code listed below, as well as some personal information. Please follow the directions to create your username and password.     Your access code is: [email or web address]  Expires: 2017 11:34 AM     Your access code will  in 90 days. If you need help or a new code, please call your [place] clinic or [phone].        Care EveryWhere ID     This is your Care EveryWhere ID. This could be used by other organizations to access your [place] medical records  [id number]         Blood Pressure from Last 3 Encounters:   17 146/85   17 146/85   17 142/76    Weight from Last 3 Encounters:   17 154 lb (69.9 kg)   17 154 lb (69.9 kg)   17 155 lb (70.3 kg)              We Performed the Following     AUDIOGRAM/TYMPANOGRAM - INTERFACE        Primary Care Provider Office Phone # Fax #    Hodan Tapia [phone]       [address] [address]        Thank you!     Thank you for choosing [facility]  [facility] your [facility]. Our goal is always to provide you with excellent care. Hearing back from our patients is one way we can continue to improve our services. Please take a few minutes to complete the written survey that you may receive in the mail after your visit with us. Thank you!             Your Updated Medication List - Protect others around you: Learn how to safely use, store and throw away your medicines at www.disposemymeds.org.          This list is accurate as of: 3/14/17  2:34 PM.  Always use your most recent med list.                   Brand Name Dispense Instructions for use    acetaminophen 500 MG tablet    TYLENOL    100 tablet    Take 2 tablets (1,000 mg) by mouth every 8 hours as needed       aspirin 81 MG tablet      Take by mouth daily       budesonide 32 MCG/ACT spray    RINOCORT AQUA "    1 Bottle    Spray 2 sprays into both nostrils daily       fish oil-omega-3 fatty acids 1000 MG capsule      Take 2 g by mouth daily       HYDROcodone-acetaminophen 5-325 MG per tablet    NORCO    10 tablet    Take 1 tablet by mouth every 6 hours as needed for moderate to severe pain       simvastatin 40 MG tablet    ZOCOR    90 tablet    Take 1 tablet (40 mg) by mouth At Bedtime       triamterene-hydrochlorothiazide 37.5-25 MG per capsule    DYAZIDE    90 capsule    Take 1 capsule by mouth daily

## 2017-03-14 NOTE — PROGRESS NOTES
Audiology Evaluation Completed. Please refer SCANNED AUDIOGRAM and/or TYMPANOGRAM for BACKGROUND, RESULTS, RECOMMENDATIONS.        Sadie FAIRBANKS, [facility]-A  Audiologist #[id number]

## 2017-04-03 DIAGNOSIS — J31.0 CHRONIC RHINITIS: Primary | ICD-10-CM

## 2017-04-05 NOTE — TELEPHONE ENCOUNTER
Pulmicort       Last Written Prescription Date: 04/03/2017  Last Fill Quantity: 60mL, # refills: 0    Last Office Visit with G, P or [facility] prescribing provider:  01/19/2017   Future Office Visit:       Date of Last Asthma Action Plan Letter:   There are no preventive care reminders to display for this patient.   Asthma Control Test: No flowsheet data found.    Date of Last Spirometry Test:   No results found for this or any previous visit.

## 2017-04-06 RX ORDER — BUDESONIDE 0.5 MG/2ML
INHALANT ORAL
Qty: 60 ML | Refills: 1 | Status: SHIPPED | OUTPATIENT
Start: 2017-04-06 | End: 2018-01-22

## 2017-04-07 ENCOUNTER — OFFICE VISIT (OUTPATIENT)
Dept: AUDIOLOGY | Facility: OTHER | Age: 72
End: 2017-04-07
Attending: AUDIOLOGIST
Payer: COMMERCIAL

## 2017-04-07 DIAGNOSIS — H90.3 SENSORINEURAL HEARING LOSS, BILATERAL: Primary | ICD-10-CM

## 2017-04-07 PROCEDURE — 92591 ZZHC HEARING AID EXAM BINAURAL: CPT | Performed by: AUDIOLOGIST

## 2017-04-07 NOTE — MR AVS SNAPSHOT
"              After Visit Summary   4/7/2017    Ten Flores    MRN: [id number]           Patient Information     Date Of Birth          1945        Visit Information        Provider Department      4/7/2017 10:30 AM Sadie Romero AuD [facility]        Today's Diagnoses     Sensorineural hearing loss, bilateral    -  1       Follow-ups after your visit        Your next 10 appointments already scheduled     Apr 28, 2017 10:30 AM CDT   (Arrive by 10:15 AM)   Return Visit with Sammi Morales   [facility] ([facility])    [address]  [facility] [address]   [phone]            Jul 20, 2017  9:45 AM CDT   (Arrive by 9:30 AM)   SHORT with Hodan Tapia MD   [facility] )    [address]  [facility] [address]   [phone]              Who to contact     If you have questions or need follow up information about today's clinic visit or your schedule please contact [facility] directly at [phone].  Normal or non-critical lab and imaging results will be communicated to you by MyChart, letter or phone within 4 business days after the clinic has received the results. If you do not hear from us within 7 days, please contact the clinic through [employer] or phone. If you have a critical or abnormal lab result, we will notify you by phone as soon as possible.  Submit refill requests through [employer] or call your pharmacy and they will forward the refill request to us. Please allow 3 business days for your refill to be completed.          Additional Information About Your Visit        MyChart Information     [employer] lets you send messages to your doctor, view your test results, renew your prescriptions, schedule appointments and more. To sign up, go to [email or web address] . Click on \"Log in\" on the left side of the screen, which will take you to the Welcome page. Then click on \"Sign up Now\" on the " right side of the page.     You will be asked to enter the access code listed below, as well as some personal information. Please follow the directions to create your username and password.     Your access code is: [email or web address]  Expires: 2017 11:34 AM     Your access code will  in 90 days. If you need help or a new code, please call your [place] clinic or [phone].        Care EveryWhere ID     This is your Care EveryWhere ID. This could be used by other organizations to access your [place] medical records  [id number]         Blood Pressure from Last 3 Encounters:   17 146/85   17 146/85   17 142/76    Weight from Last 3 Encounters:   17 154 lb (69.9 kg)   17 154 lb (69.9 kg)   17 155 lb (70.3 kg)              Today, you had the following     No orders found for display       Primary Care Provider Office Phone # Fax #    Hodan Tapia [phone]       [address] [address]        Thank you!     Thank you for choosing [facility] [place]  for your care. Our goal is always to provide you with excellent care. Hearing back from our patients is one way we can continue to improve our services. Please take a few minutes to complete the written survey that you may receive in the mail after your visit with us. Thank you!             Your Updated Medication List - Protect others around you: Learn how to safely use, store and throw away your medicines at www.disposemymeds.org.          This list is accurate as of: 17 12:40 PM.  Always use your most recent med list.                   Brand Name Dispense Instructions for use    acetaminophen 500 MG tablet    TYLENOL    100 tablet    Take 2 tablets (1,000 mg) by mouth every 8 hours as needed       aspirin 81 MG tablet      Take by mouth daily       budesonide 0.5 MG/2ML neb solution    PULMICORT    60 mL    MIX 2 ML VIAL OF BUDESONIDE (0.5 MG)  WITH 240 ML OF JOSE ANGEL MED SINUS RINSE, TWICE DAILY FOR 2 WEEKS.       budesonide 32 MCG/ACT spray    RINOCORT AQUA    1 Bottle    Spray 2 sprays into both nostrils daily       fish oil-omega-3 fatty acids 1000 MG capsule      Take 2 g by mouth daily       HYDROcodone-acetaminophen 5-325 MG per tablet    NORCO    10 tablet    Take 1 tablet by mouth every 6 hours as needed for moderate to severe pain       simvastatin 40 MG tablet    ZOCOR    90 tablet    Take 1 tablet (40 mg) by mouth At Bedtime       triamterene-hydrochlorothiazide 37.5-25 MG per capsule    DYAZIDE    90 capsule    Take 1 capsule by mouth daily

## 2017-04-07 NOTE — PROGRESS NOTES
BACKGROUND:  Ten was seen today for consult regarding hearing aids, accompanied by his wife. The patient notes difficulty with communication in a variety of listening situations and would like to explore possible benefit obtained via use of amplification.      Patient has received medical clearance for hearing aid use from Dr. Hernandez, ENT.  Ten is a binaural hearing aid candidate and will receive a Hearing Aid Recommendation today.    RESULTS:  Brochure form [place] Department of Health titled 'Legal rights and consumer information about purchasing a hearing instrument ' verbally reviewed and given to patient. Trial Period, cancellation fee, warranties highlighted. Estimated insurance coverage for hearing aids reviewed. Patient risk factors have been provided to the patient in writing prior to the sale of the hearing aid per FDA regulation. The risk factors are also available in the User Instructional Booklet to be presented on the day of the hearing aid fitting.        Thru use of hearing aids he would like to improve her ability to hear:    In restaurants where there are groups and noise.      to hear the television at a lower volume so that she can enjoy this activity with other people.     Ten also reports trouble hearing in the car, at home, and on the telephone if there is not a volume control.      Discussed hearing aid styles, technology, features, and options at length with Ten.  Sample devices were shown. Pros/Cons of options reviewed.  Expectations for amplification discussed. .Also discussed the importance of binaural amplification for hearing speech in the presence of background noise and localizing the directions of sounds.  Wireless options were also discussed at length and sample devices were shown.     Hearing Aid Recommendations: Hearing Aid Recommendation reviewed with patient. Pt chose to proceed with order and Purchase Agreement completed. Copies provided to  patient.      Hearing Aids:  Binaural Oticon Opn 3  Color: dark grey  Battery Size: 312  Earmold/Tips:  IN-THE-CANAL (JONATHAN) 3-85        RECOMMENDATIONS:  Recommended to return to clinic in 3 weeks for hearing aid fitting, programming and orientation.    Sadie Romero M.S.,[facility]-A  Audiologist #[id number]

## 2017-05-25 ENCOUNTER — OFFICE VISIT (OUTPATIENT)
Dept: AUDIOLOGY | Facility: OTHER | Age: 72
End: 2017-05-25
Attending: AUDIOLOGIST
Payer: COMMERCIAL

## 2017-05-25 DIAGNOSIS — H90.3 SENSORINEURAL HEARING LOSS, BILATERAL: Primary | ICD-10-CM

## 2017-05-25 PROCEDURE — V5261 HEARING AID, DIGIT, BIN, BTE: HCPCS | Mod: NU | Performed by: AUDIOLOGIST

## 2017-05-25 NOTE — MR AVS SNAPSHOT
"              After Visit Summary   5/25/2017    Ten Flores    MRN: [id number]           Patient Information     Date Of Birth          1945        Visit Information        Provider Department      5/25/2017 3:30 PM Sadie Romero AuD [facility]        Today's Diagnoses     Sensorineural hearing loss, bilateral    -  1       Follow-ups after your visit        Your next 10 appointments already scheduled     Jun 14, 2017 11:30 AM CDT   (Arrive by 11:15 AM)   Return Visit with Sammi Morales   [facility]    [address]  [facility] [address]   [phone]            Jul 20, 2017  9:45 AM CDT   (Arrive by 9:30 AM)   SHORT with Hodan Tapia MD   [facility]    [address]  [facility] [address]   [phone]              Who to contact     If you have questions or need follow up information about today's clinic visit or your schedule please contact [facility] directly at [phone].  Normal or non-critical lab and imaging results will be communicated to you by MyChart, letter or phone within 4 business days after the clinic has received the results. If you do not hear from us within 7 days, please contact the clinic through [employer] or phone. If you have a critical or abnormal lab result, we will notify you by phone as soon as possible.  Submit refill requests through [employer] or call your pharmacy and they will forward the refill request to us. Please allow 3 business days for your refill to be completed.          Additional Information About Your Visit        MyChart Information     [employer] lets you send messages to your doctor, view your test results, renew your prescriptions, schedule appointments and more. To sign up, go to [email or web address] . Click on \"Log in\" on the left side of the screen, which will take you to the Welcome page. Then click on \"Sign up Now\" on the " right side of the page.     You will be asked to enter the access code listed below, as well as some personal information. Please follow the directions to create your username and password.     Your access code is: [email or web address]  Expires: 2017  3:58 PM     Your access code will  in 90 days. If you need help or a new code, please call your [place] clinic or [phone].        Care EveryWhere ID     This is your Care EveryWhere ID. This could be used by other organizations to access your [place] medical records  [id number]         Blood Pressure from Last 3 Encounters:   17 146/85   17 146/85   17 142/76    Weight from Last 3 Encounters:   17 154 lb (69.9 kg)   17 154 lb (69.9 kg)   17 155 lb (70.3 kg)              Today, you had the following     No orders found for display       Primary Care Provider Office Phone # Fax #    Hodan Tapia [phone]       [address] [address]        Thank you!     Thank you for choosing [facility] [place]  for your care. Our goal is always to provide you with excellent care. Hearing back from our patients is one way we can continue to improve our services. Please take a few minutes to complete the written survey that you may receive in the mail after your visit with us. Thank you!             Your Updated Medication List - Protect others around you: Learn how to safely use, store and throw away your medicines at www.disposemymeds.org.          This list is accurate as of: 17  3:58 PM.  Always use your most recent med list.                   Brand Name Dispense Instructions for use    acetaminophen 500 MG tablet    TYLENOL    100 tablet    Take 2 tablets (1,000 mg) by mouth every 8 hours as needed       aspirin 81 MG tablet      Take by mouth daily       budesonide 0.5 MG/2ML neb solution    PULMICORT    60 mL    MIX 2 ML VIAL OF BUDESONIDE (0.5 MG)  WITH 240 ML OF JOSE ANGEL MED SINUS RINSE, TWICE DAILY FOR 2 WEEKS.       budesonide 32 MCG/ACT spray    RINOCORT AQUA    1 Bottle    Spray 2 sprays into both nostrils daily       fish oil-omega-3 fatty acids 1000 MG capsule      Take 2 g by mouth daily       HYDROcodone-acetaminophen 5-325 MG per tablet    NORCO    10 tablet    Take 1 tablet by mouth every 6 hours as needed for moderate to severe pain       simvastatin 40 MG tablet    ZOCOR    90 tablet    Take 1 tablet (40 mg) by mouth At Bedtime       triamterene-hydrochlorothiazide 37.5-25 MG per capsule    DYAZIDE    90 capsule    Take 1 capsule by mouth daily

## 2017-05-25 NOTE — PROGRESS NOTES
AUDIOLOGY REPORT    BACKGROUND INFORMATION: Ten Flores was seen in the Audiology Clinic  on 5/25/2017 for fitting of Oticon OPN 3  IN-THE-CANAL (JONATHAN) Hearing aids.  Earmolds are 3-85  IN-THE-CANAL (JONATHAN) with 10mm closed domes.    Serial Number Right: #[id number]  Serial Number Left: #[id number]  Battery Size: #312  Repair and loss and damage warranty expiration: 5/7/2020    PROCEDURES: Hearing aids were placed and they provided a good fit,.   After this fitting an orientation was completed giving him information on the use of the instruments.   Patient was counseled on the occlusion effect, acclimating to hearing aids and adaptation time. Use and care of the instruments was reviewed.  Earmolds were fit onto the hearing aids and provided a good fit.   Hearing aid conformity evaluation was performed. Patient was provided wire loop and brush cleaning tools which was demonstrated and patient showed ability to use. The patient demonstrated good ability to insert and remove the earmolds and batteries. Patient was given all supplies and a Manual for the hearing aids.  How to manually use program toggle for the volume was discussed at length.     SUMMARY AND RECOMMENDATIONS: Hearing aids were fit today.   The patient will return for follow-up.  Call this clinic with questions regarding today s appointment.        Sadie Romero M.S., [facility]-A  Audiologist #[id number]

## 2017-05-28 DIAGNOSIS — E78.00 HYPERCHOLESTEROLEMIA: ICD-10-CM

## 2017-05-28 DIAGNOSIS — I10 BENIGN ESSENTIAL HYPERTENSION: ICD-10-CM

## 2017-05-31 RX ORDER — SIMVASTATIN 40 MG
TABLET ORAL
Qty: 90 TABLET | Refills: 1 | Status: SHIPPED | OUTPATIENT
Start: 2017-05-31 | End: 2017-12-12

## 2017-05-31 RX ORDER — TRIAMTERENE AND HYDROCHLOROTHIAZIDE 37.5; 25 MG/1; MG/1
CAPSULE ORAL
Qty: 90 CAPSULE | Refills: 1 | Status: SHIPPED | OUTPATIENT
Start: 2017-05-31 | End: 2017-12-12

## 2017-06-14 ENCOUNTER — OFFICE VISIT (OUTPATIENT)
Dept: AUDIOLOGY | Facility: OTHER | Age: 72
End: 2017-06-14
Attending: AUDIOLOGIST
Payer: COMMERCIAL

## 2017-06-14 DIAGNOSIS — H90.3 SENSORINEURAL HEARING LOSS, BILATERAL: Primary | ICD-10-CM

## 2017-06-14 PROCEDURE — V5299 HEARING SERVICE: HCPCS | Performed by: AUDIOLOGIST

## 2017-06-14 NOTE — MR AVS SNAPSHOT
"              After Visit Summary   6/14/2017    Ten Flores    MRN: [id number]           Patient Information     Date Of Birth          1945        Visit Information        Provider Department      6/14/2017 11:30 AM [facility]        Today's Diagnoses     Sensorineural hearing loss, bilateral    -  1       Follow-ups after your visit        Your next 10 appointments already scheduled     Jul 20, 2017  9:45 AM CDT   (Arrive by 9:30 AM)   SHORT with Hodan Tapia MD   [facility] ([facility] )    [address] [facility] [address]   [phone]              Who to contact     If you have questions or need follow up information about today's clinic visit or your schedule please contact [facility] directly at [phone].  Normal or non-critical lab and imaging results will be communicated to you by MyChart, letter or phone within 4 business days after the clinic has received the results. If you do not hear from us within 7 days, please contact the clinic through MyChart or phone. If you have a critical or abnormal lab result, we will notify you by phone as soon as possible.  Submit refill requests through [employer] or call your pharmacy and they will forward the refill request to us. Please allow 3 business days for your refill to be completed.          Additional Information About Your Visit        MyChart Information     [employer] lets you send messages to your doctor, view your test results, renew your prescriptions, schedule appointments and more. To sign up, go to [email or web address] . Click on \"Log in\" on the left side of the screen, which will take you to the Welcome page. Then click on \"Sign up Now\" on the right side of the page.     You will be asked to enter the access code listed below, as well as some personal information. Please follow the directions to create your username and " password.     Your access code is: [email or web address]  Expires: 2017  3:58 PM     Your access code will  in 90 days. If you need help or a new code, please call your [place] clinic or [phone].        Care EveryWhere ID     This is your Care EveryWhere ID. This could be used by other organizations to access your [place] medical records  [id number]         Blood Pressure from Last 3 Encounters:   17 146/85   17 146/85   17 142/76    Weight from Last 3 Encounters:   17 154 lb (69.9 kg)   17 154 lb (69.9 kg)   17 155 lb (70.3 kg)              Today, you had the following     No orders found for display       Primary Care Provider Office Phone # Fax #    Hodan Tapia [phone]       [address] [address]        Thank you!     [facility] you [facility]  [facility]. Our goal is always to provide you with excellent care. Hearing back from our patients is one way we can continue to improve our services. Please take a few minutes to complete the written survey that you may receive in the mail after your visit with us. Thank you!             Your Updated Medication List - Protect others around you: Learn how to safely use, store and throw away your medicines at www.disposemymeds.org.          This list is accurate as of: 17 11:38 AM.  Always use your most recent med list.                   Brand Name Dispense Instructions for use    acetaminophen 500 MG tablet    TYLENOL    100 tablet    Take 2 tablets (1,000 mg) by mouth every 8 hours as needed       aspirin 81 MG tablet      Take by mouth daily       budesonide 0.5 MG/2ML neb solution    PULMICORT    60 mL    MIX 2 ML VIAL OF BUDESONIDE (0.5 MG) WITH 240 ML OF JOSE ANGEL MED SINUS RINSE, TWICE DAILY FOR 2 WEEKS.       budesonide 32 MCG/ACT spray    RINOCORT AQUA    1 Bottle    Spray 2 sprays into both nostrils daily       fish  oil-omega-3 fatty acids 1000 MG capsule      Take 2 g by mouth daily       HYDROcodone-acetaminophen 5-325 MG per tablet    NORCO    10 tablet    Take 1 tablet by mouth every 6 hours as needed for moderate to severe pain       simvastatin 40 MG tablet    ZOCOR    90 tablet    TAKE 1 TABLET BY MOUTH AT BEDTIME       triamterene-hydrochlorothiazide 37.5-25 MG per capsule    DYAZIDE    90 capsule    TAKE 1 CAPSULE BY MOUTH DAILY

## 2017-06-14 NOTE — PROGRESS NOTES
AUDIOLOGY REPORT    BACKGROUND INFORMATION: Ten Flores was seen in the Audiology Clinic at [facility]  on 6/14/2017 for follow-up.  The patient reports good sound quality with the hearing aid(s).      RESULTS: A first follow-up was performed.  Listening goals reviewed and are met. Patient reports he likes them and will keep. Reviewed best insertion and if retention concerns to have retention hooks. He will call if needed.    SUMMARY AND RECOMMENDATIONS: A hearing aid check was performed today and the patient reports satisfaction..  The patient will return for follow-up in 12 months sooner if needed.  Call this clinic with questions regarding today s visit. 20 paks batteries given.    Sadie Romero M.S., [facility]  Audiologist #[id number]

## 2017-07-18 DIAGNOSIS — E78.00 HYPERCHOLESTEROLEMIA: ICD-10-CM

## 2017-07-18 DIAGNOSIS — I10 BENIGN ESSENTIAL HYPERTENSION: ICD-10-CM

## 2017-07-18 LAB
ALT SERPL W P-5'-P-CCNC: 58 U/L (ref 0–70)
ANION GAP SERPL CALCULATED.3IONS-SCNC: 10 MMOL/L (ref 3–14)
BUN SERPL-MCNC: 23 MG/DL (ref 7–30)
CALCIUM SERPL-MCNC: 9.1 MG/DL (ref 8.5–10.1)
CHLORIDE SERPL-SCNC: 100 MMOL/L (ref 94–109)
CHOLEST SERPL-MCNC: 202 MG/DL
CO2 SERPL-SCNC: 27 MMOL/L (ref 20–32)
CREAT SERPL-MCNC: 1.28 MG/DL (ref 0.66–1.25)
GFR SERPL CREATININE-BSD FRML MDRD: 55 ML/MIN/1.7M2
GLUCOSE SERPL-MCNC: 94 MG/DL (ref 70–99)
HDLC SERPL-MCNC: 49 MG/DL
LDLC SERPL CALC-MCNC: 128 MG/DL
NONHDLC SERPL-MCNC: 153 MG/DL
POTASSIUM SERPL-SCNC: 3.6 MMOL/L (ref 3.4–5.3)
SODIUM SERPL-SCNC: 137 MMOL/L (ref 133–144)
TRIGL SERPL-MCNC: 125 MG/DL

## 2017-07-18 PROCEDURE — 36415 COLL VENOUS BLD VENIPUNCTURE: CPT | Mod: ZL | Performed by: FAMILY MEDICINE

## 2017-07-18 PROCEDURE — 84460 ALANINE AMINO (ALT) (SGPT): CPT | Mod: ZL | Performed by: FAMILY MEDICINE

## 2017-07-18 PROCEDURE — 80061 LIPID PANEL: CPT | Mod: ZL | Performed by: FAMILY MEDICINE

## 2017-07-18 PROCEDURE — 80048 BASIC METABOLIC PNL TOTAL CA: CPT | Mod: ZL | Performed by: FAMILY MEDICINE

## 2017-07-20 ENCOUNTER — OFFICE VISIT (OUTPATIENT)
Dept: FAMILY MEDICINE | Facility: OTHER | Age: 72
End: 2017-07-20
Attending: FAMILY MEDICINE
Payer: COMMERCIAL

## 2017-07-20 VITALS
DIASTOLIC BLOOD PRESSURE: 82 MMHG | TEMPERATURE: 97.7 F | SYSTOLIC BLOOD PRESSURE: 136 MMHG | OXYGEN SATURATION: 97 % | BODY MASS INDEX: 23.34 KG/M2 | HEIGHT: 68 IN | WEIGHT: 154 LBS | HEART RATE: 70 BPM | RESPIRATION RATE: 20 BRPM

## 2017-07-20 DIAGNOSIS — E78.00 HYPERCHOLESTEROLEMIA: ICD-10-CM

## 2017-07-20 DIAGNOSIS — I10 BENIGN ESSENTIAL HYPERTENSION: Primary | ICD-10-CM

## 2017-07-20 PROCEDURE — 99212 OFFICE O/P EST SF 10 MIN: CPT

## 2017-07-20 PROCEDURE — 99213 OFFICE O/P EST LOW 20 MIN: CPT | Performed by: FAMILY MEDICINE

## 2017-07-20 ASSESSMENT — ANXIETY QUESTIONNAIRES
4. TROUBLE RELAXING: NOT AT ALL
GAD7 TOTAL SCORE: 0
2. NOT BEING ABLE TO STOP OR CONTROL WORRYING: NOT AT ALL
5. BEING SO RESTLESS THAT IT IS HARD TO SIT STILL: NOT AT ALL
1. FEELING NERVOUS, ANXIOUS, OR ON EDGE: NOT AT ALL
7. FEELING AFRAID AS IF SOMETHING AWFUL MIGHT HAPPEN: NOT AT ALL
IF YOU CHECKED OFF ANY PROBLEMS ON THIS QUESTIONNAIRE, HOW DIFFICULT HAVE THESE PROBLEMS MADE IT FOR YOU TO DO YOUR WORK, TAKE CARE OF THINGS AT HOME, OR GET ALONG WITH OTHER PEOPLE: NOT DIFFICULT AT ALL
3. WORRYING TOO MUCH ABOUT DIFFERENT THINGS: NOT AT ALL
6. BECOMING EASILY ANNOYED OR IRRITABLE: NOT AT ALL

## 2017-07-20 ASSESSMENT — PAIN SCALES - GENERAL: PAINLEVEL: NO PAIN (0)

## 2017-07-20 NOTE — PROGRESS NOTES
SUBJECTIVE:                                                    Ten Flores is a 72 year old male who presents to clinic today for the following health issues:    Hyperlipidemia Follow-Up      Rate your low fat/cholesterol diet?: good    Taking statin?  Yes, no muscle aches from statin    Other lipid medications/supplements?:  none    Hypertension Follow-up      Outpatient blood pressures are not being checked.    Low Salt Diet: no added salt    Patient completed labs prior to visit.      Problem list and histories reviewed & adjusted, as indicated.  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         Social History   Substance Use Topics     Smoking status: Former Smoker     Years: 5.00     Types: Cigarettes     Smokeless tobacco: Former User      Comment: Tried to Quit (YES)     Alcohol use 0.0 oz/week      Comment: DAILY,wine,beer     Family History   Problem Relation Age of Onset     HEART DISEASE Mother 68     Disease - Cause of Death     HEART DISEASE Father 89     Disease - Cause of Death         Current Outpatient Prescriptions   Medication Sig Dispense Refill     triamterene-hydrochlorothiazide (DYAZIDE) 37.5-25 MG per capsule TAKE 1 CAPSULE BY MOUTH DAILY 90 capsule 1     simvastatin (ZOCOR) 40 MG tablet TAKE 1 TABLET BY MOUTH AT BEDTIME 90 tablet 1     budesonide (PULMICORT) 0.5 MG/2ML neb solution MIX 2 ML VIAL OF BUDESONIDE (0.5 MG) WITH 240 ML OF JOSE ANGEL MED SINUS RINSE, TWICE DAILY FOR 2 WEEKS. 60 mL 1     budesonide (RINOCORT AQUA) 32 MCG/ACT spray Spray 2 sprays into both nostrils daily 1  "Bottle 11     acetaminophen (TYLENOL) 500 MG tablet Take 2 tablets (1,000 mg) by mouth every 8 hours as needed 100 tablet 1     HYDROcodone-acetaminophen (NORCO) 5-325 MG per tablet Take 1 tablet by mouth every 6 hours as needed for moderate to severe pain 10 tablet 0     aspirin 81 MG tablet Take by mouth daily       fish oil-omega-3 fatty acids (FISH OIL) 1000 MG capsule Take 2 g by mouth daily        Allergies   Allergen Reactions     Sulfasuccinamide Sodium Hives     Sulfa Drugs Rash         Reviewed and updated as needed this visit by clinical staffTobacco  Allergies  Meds  Med Hx  Surg Hx  Fam Hx  Soc Hx      Reviewed and updated as needed this visit by Provider         ROS:  Constitutional, HEENT, cardiovascular, pulmonary, gi and gu systems are negative, except as otherwise noted.      OBJECTIVE:   /82 (BP Location: Left arm, Patient Position: Sitting, Cuff Size: Adult Regular)  Pulse 70  Temp 97.7  F (36.5  C) (Tympanic)  Resp 20  Ht 5' 7.5\" (1.715 m)  Wt 154 lb (69.9 kg)  SpO2 97%  BMI 23.76 kg/m2  Body mass index is 23.76 kg/(m^2).  GENERAL: healthy, alert and no distress  HENT: ear canals and TM's normal, nose and mouth without ulcers or lesions  RESP: lungs clear to auscultation - no rales, rhonchi or wheezes  CV: regular rate and rhythm, normal S1 S2, no S3 or S4, no murmur, click or rub, no peripheral edema and peripheral pulses strong  PSYCH: mentation appears normal, affect normal/bright    Diagnostic Test Results:  Results for orders placed or performed in visit on 07/18/17   Basic metabolic panel   Result Value Ref Range    Sodium 137 133 - 144 mmol/L    Potassium 3.6 3.4 - 5.3 mmol/L    Chloride 100 94 - 109 mmol/L    Carbon Dioxide 27 20 - 32 mmol/L    Anion Gap 10 3 - 14 mmol/L    Glucose 94 70 - 99 mg/dL    Urea Nitrogen 23 7 - 30 mg/dL    Creatinine 1.28 (H) 0.66 - 1.25 mg/dL    GFR Estimate 55 (L) >60 mL/min/1.7m2    GFR Estimate If Black 67 >60 mL/min/1.7m2    Calcium 9.1 " 8.5 - 10.1 mg/dL   Lipid Profile   Result Value Ref Range    Cholesterol 202 (H) <200 mg/dL    Triglycerides 125 <150 mg/dL    HDL Cholesterol 49 >39 mg/dL    LDL Cholesterol Calculated 128 (H) <100 mg/dL    Non HDL Cholesterol 153 (H) <130 mg/dL   ALT   Result Value Ref Range    ALT 58 0 - 70 U/L       ASSESSMENT/PLAN:     Hyperlipidemia; controlled   Plan:  No changes in the patient's current treatment plan    Hypertension; controlled   Associated with the following complications:    None   Plan:  No changes in the patient's current treatment plan            ICD-10-CM    1. Benign essential hypertension I10    2. Hypercholesterolemia E78.00        FUTURE APPOINTMENTS:       - Follow-up visit in 6 months, sooner as needed.      Hodan Tapia MD  [facility]

## 2017-07-20 NOTE — NURSING NOTE
"Chief Complaint   Patient presents with     Hypertension     136/82     Lipids     labs down previously       Initial /82 (BP Location: Left arm, Patient Position: Sitting, Cuff Size: Adult Regular)  Pulse 70  Temp 97.7  F (36.5  C) (Tympanic)  Resp 20  Ht 5' 7.5\" (1.715 m)  Wt 154 lb (69.9 kg)  SpO2 97%  BMI 23.76 kg/m2 Estimated body mass index is 23.76 kg/(m^2) as calculated from the following:    Height as of this encounter: 5' 7.5\" (1.715 m).    Weight as of this encounter: 154 lb (69.9 kg).  Medication Reconciliation: complete   Delmi Rowland      "

## 2017-07-20 NOTE — MR AVS SNAPSHOT
After Visit Summary   7/20/2017    Ten Flores    MRN: [id number]           Patient Information     Date Of Birth          1945        Visit Information        Provider Department      7/20/2017 9:45 AM Hodan Tapia MD [facility]        Today's Diagnoses     Benign essential hypertension    -  1    Hypercholesterolemia           Follow-ups after your visit        Follow-up notes from your care team     Return in about 6 months (around 1/20/2018).      Your next 10 appointments already scheduled     Jan 22, 2018  9:45 AM CST   (Arrive by 9:30 AM)   SHORT with Hodan Tapia MD   [facility] ([facility] )    [address]  [facility] [address]   [phone]            Jun 22, 2018  1:00 PM CDT   (Arrive by 12:45 PM)   Hearing Eval with Sammi Morales   [facility] [place] ([facility] [place] )    [address]  [place] [address]   [phone]              Future tests that were ordered for you today     Open Future Orders        Priority Expected Expires Ordered    Basic metabolic panel Routine  7/20/2018 7/20/2017    Lipid Profile Routine  7/20/2018 7/20/2017    ALT Routine  7/20/2018 7/20/2017            Who to contact     If you have questions or need follow up information about today's clinic visit or your schedule please contact [facility] directly at [phone].  Normal or non-critical lab and imaging results will be communicated to you by MyChart, letter or phone within 4 business days after the clinic has received the results. If you do not hear from us within 7 days, please contact the clinic through MyChart or phone. If you have a critical or abnormal lab result, we will notify you by phone as soon as possible.  Submit refill requests through [employer] or call your pharmacy and they will forward the refill request to us. Please allow 3 business days for your refill to  "be completed.          Additional Information About Your Visit        [employer] ([employer]) Information     [employer] lets you send messages to your doctor, view your test results, renew your prescriptions, schedule appointments and more. To sign up, go to [email or web address] . Click on \"Log in\" on the left side of the screen, which will take you to the Welcome page. Then click on \"Sign up Now\" on the right side of the page.     You will be asked to enter the access code listed below, as well as some personal information. Please follow the directions to create your username and password.     Your access code is: [email or web address]  Expires: 2017  3:58 PM     Your access code will  in 90 days. If you need help or a new code, please call your [place] clinic or [phone].        Care EveryWhere ID     This is your Care EveryWhere ID. This could be used by other organizations to access your [place] medical records  [id number]        Your Vitals Were     Pulse Temperature Respirations Height Pulse Oximetry BMI (Body Mass Index)    70 97.7  F (36.5  C) (Tympanic) 20 5' 7.5\" (1.715 m) 97% 23.76 kg/m2       Blood Pressure from Last 3 Encounters:   17 136/82   17 146/85   17 146/85    Weight from Last 3 Encounters:   17 154 lb (69.9 kg)   17 154 lb (69.9 kg)   17 154 lb (69.9 kg)               Primary Care Provider Office Phone # Fax #    Hodan Tapia [phone]       [facility] [address] [place] [id number]        Equal Access to Services     SHANIQUA PEREZ : Idalia Cardona, miguel marquez, sivan ramirez. So [facility] [phone].    ATENCIÓN: Si habla español, tiene a kincaid disposición servicios gratuitos de asistencia lingüística. Llame al [phone].    We comply with applicable federal civil rights laws and [place] laws. We do not discriminate on the basis of race, " color, national origin, age, disability sex, sexual orientation or gender identity.            Thank you!     Thank you for choosing [facility]  for your care. Our goal is always to provide you with excellent care. Hearing back from our patients is one way we can continue to improve our services. Please take a few minutes to complete the written survey that you may receive in the mail after your visit with us. Thank you!             Your Updated Medication List - Protect others around you: Learn how to safely use, store and throw away your medicines at www.disposemymeds.org.          This list is accurate as of: 7/20/17  2:10 PM.  Always use your most recent med list.                   Brand Name Dispense Instructions for use Diagnosis    acetaminophen 500 MG tablet    TYLENOL    100 tablet    Take 2 tablets (1,000 mg) by mouth every 8 hours as needed    Hip pain, right       aspirin 81 MG tablet      Take by mouth daily        budesonide 0.5 MG/2ML neb solution    PULMICORT    60 mL    MIX 2 ML VIAL OF BUDESONIDE (0.5 MG) WITH 240 ML OF JOSE ANGEL MED SINUS RINSE, TWICE DAILY FOR 2 WEEKS.    Chronic rhinitis       budesonide 32 MCG/ACT spray    RINOCORT AQUA    1 Bottle    Spray 2 sprays into both nostrils daily    Chronic rhinitis, Nasal turbinate hypertrophy       fish oil-omega-3 fatty acids 1000 MG capsule      Take 2 g by mouth daily        HYDROcodone-acetaminophen 5-325 MG per tablet    NORCO    10 tablet    Take 1 tablet by mouth every 6 hours as needed for moderate to severe pain    Hip pain, right       simvastatin 40 MG tablet    ZOCOR    90 tablet    TAKE 1 TABLET BY MOUTH AT BEDTIME    Hypercholesterolemia       triamterene-hydrochlorothiazide 37.5-25 MG per capsule    DYAZIDE    90 capsule    TAKE 1 CAPSULE BY MOUTH DAILY    Benign essential hypertension

## 2017-07-21 ASSESSMENT — ANXIETY QUESTIONNAIRES: GAD7 TOTAL SCORE: 0

## 2017-07-21 ASSESSMENT — PATIENT HEALTH QUESTIONNAIRE - PHQ9: SUM OF ALL RESPONSES TO PHQ QUESTIONS 1-9: 0

## 2017-12-12 DIAGNOSIS — I10 BENIGN ESSENTIAL HYPERTENSION: ICD-10-CM

## 2017-12-12 DIAGNOSIS — E78.00 HYPERCHOLESTEROLEMIA: ICD-10-CM

## 2017-12-13 NOTE — TELEPHONE ENCOUNTER
Triamterene-HCTZ      Last Written Prescription Date: 5/31/2017  Last Fill Quantity: 90,  # refills: 1   Last Office Visit with [facility], [facility] or  Health prescribing provider: 7/20/2017                        Zocor       Last Written Prescription Date: 5/31/2017  Last Fill Quantity: 90,  # refills: 1   Last Office Visit with [facility], [facility] or  Health prescribing provider: 7/20/2017                             Next 5 appointments (look out 90 days)     Jan 22, 2018  9:45 AM CST   (Arrive by 9:30 AM)   SHORT with Hodan Tapia MD   [facility] ([facility] )    [address]  [facility] [address]   [phone]

## 2017-12-14 RX ORDER — TRIAMTERENE AND HYDROCHLOROTHIAZIDE 37.5; 25 MG/1; MG/1
CAPSULE ORAL
Qty: 90 CAPSULE | Refills: 1 | Status: SHIPPED | OUTPATIENT
Start: 2017-12-14 | End: 2018-01-22 | Stop reason: SINTOL

## 2017-12-14 RX ORDER — SIMVASTATIN 40 MG
TABLET ORAL
Qty: 90 TABLET | Refills: 1 | Status: SHIPPED | OUTPATIENT
Start: 2017-12-14 | End: 2018-07-23

## 2017-12-24 ENCOUNTER — HEALTH MAINTENANCE LETTER (OUTPATIENT)
Age: 72
End: 2017-12-24

## 2018-01-17 DIAGNOSIS — E78.00 HYPERCHOLESTEROLEMIA: ICD-10-CM

## 2018-01-17 DIAGNOSIS — M25.50 MULTIPLE JOINT PAIN: Primary | ICD-10-CM

## 2018-01-17 DIAGNOSIS — I10 BENIGN ESSENTIAL HYPERTENSION: ICD-10-CM

## 2018-01-17 LAB
ALT SERPL W P-5'-P-CCNC: 46 U/L (ref 0–70)
ANION GAP SERPL CALCULATED.3IONS-SCNC: 11 MMOL/L (ref 3–14)
BUN SERPL-MCNC: 18 MG/DL (ref 7–30)
CALCIUM SERPL-MCNC: 9.1 MG/DL (ref 8.5–10.1)
CHLORIDE SERPL-SCNC: 105 MMOL/L (ref 94–109)
CHOLEST SERPL-MCNC: 182 MG/DL
CO2 SERPL-SCNC: 25 MMOL/L (ref 20–32)
CREAT SERPL-MCNC: 1.17 MG/DL (ref 0.66–1.25)
GFR SERPL CREATININE-BSD FRML MDRD: 61 ML/MIN/1.7M2
GLUCOSE SERPL-MCNC: 102 MG/DL (ref 70–99)
HDLC SERPL-MCNC: 50 MG/DL
LDLC SERPL CALC-MCNC: 102 MG/DL
NONHDLC SERPL-MCNC: 132 MG/DL
POTASSIUM SERPL-SCNC: 4.4 MMOL/L (ref 3.4–5.3)
SODIUM SERPL-SCNC: 141 MMOL/L (ref 133–144)
TRIGL SERPL-MCNC: 151 MG/DL

## 2018-01-17 PROCEDURE — 80048 BASIC METABOLIC PNL TOTAL CA: CPT | Mod: ZL | Performed by: FAMILY MEDICINE

## 2018-01-17 PROCEDURE — 80061 LIPID PANEL: CPT | Mod: ZL | Performed by: FAMILY MEDICINE

## 2018-01-17 PROCEDURE — 84550 ASSAY OF BLOOD/URIC ACID: CPT | Mod: ZL | Performed by: FAMILY MEDICINE

## 2018-01-17 PROCEDURE — 84460 ALANINE AMINO (ALT) (SGPT): CPT | Mod: ZL | Performed by: FAMILY MEDICINE

## 2018-01-17 PROCEDURE — 36415 COLL VENOUS BLD VENIPUNCTURE: CPT | Mod: ZL | Performed by: FAMILY MEDICINE

## 2018-01-18 LAB — URATE SERPL-MCNC: 5.8 MG/DL (ref 3.5–7.2)

## 2018-01-22 ENCOUNTER — OFFICE VISIT (OUTPATIENT)
Dept: FAMILY MEDICINE | Facility: OTHER | Age: 73
End: 2018-01-22
Attending: FAMILY MEDICINE
Payer: COMMERCIAL

## 2018-01-22 VITALS
DIASTOLIC BLOOD PRESSURE: 72 MMHG | WEIGHT: 161 LBS | OXYGEN SATURATION: 97 % | HEIGHT: 68 IN | HEART RATE: 72 BPM | TEMPERATURE: 97.8 F | RESPIRATION RATE: 14 BRPM | SYSTOLIC BLOOD PRESSURE: 128 MMHG | BODY MASS INDEX: 24.4 KG/M2

## 2018-01-22 DIAGNOSIS — I10 BENIGN ESSENTIAL HYPERTENSION: ICD-10-CM

## 2018-01-22 DIAGNOSIS — M79.671 RIGHT FOOT PAIN: ICD-10-CM

## 2018-01-22 DIAGNOSIS — E78.00 HYPERCHOLESTEROLEMIA: Primary | ICD-10-CM

## 2018-01-22 DIAGNOSIS — Z12.5 SCREENING FOR PROSTATE CANCER: ICD-10-CM

## 2018-01-22 LAB — PSA SERPL-ACNC: 1.92 UG/L (ref 0–4)

## 2018-01-22 PROCEDURE — G0103 PSA SCREENING: HCPCS | Mod: ZL | Performed by: FAMILY MEDICINE

## 2018-01-22 PROCEDURE — 99214 OFFICE O/P EST MOD 30 MIN: CPT | Performed by: FAMILY MEDICINE

## 2018-01-22 PROCEDURE — G0463 HOSPITAL OUTPT CLINIC VISIT: HCPCS

## 2018-01-22 RX ORDER — MELOXICAM 7.5 MG/1
7.5 TABLET ORAL DAILY
Qty: 90 TABLET | Refills: 1 | Status: SHIPPED | OUTPATIENT
Start: 2018-01-22 | End: 2018-07-23

## 2018-01-22 ASSESSMENT — ANXIETY QUESTIONNAIRES
3. WORRYING TOO MUCH ABOUT DIFFERENT THINGS: NOT AT ALL
7. FEELING AFRAID AS IF SOMETHING AWFUL MIGHT HAPPEN: NOT AT ALL
2. NOT BEING ABLE TO STOP OR CONTROL WORRYING: NOT AT ALL
6. BECOMING EASILY ANNOYED OR IRRITABLE: NOT AT ALL
1. FEELING NERVOUS, ANXIOUS, OR ON EDGE: NOT AT ALL
GAD7 TOTAL SCORE: 0
5. BEING SO RESTLESS THAT IT IS HARD TO SIT STILL: NOT AT ALL

## 2018-01-22 ASSESSMENT — PATIENT HEALTH QUESTIONNAIRE - PHQ9
5. POOR APPETITE OR OVEREATING: NOT AT ALL
SUM OF ALL RESPONSES TO PHQ QUESTIONS 1-9: 0

## 2018-01-22 NOTE — NURSING NOTE
"Chief Complaint   Patient presents with     Hypertension     Hyperlipidemia     Patient completed bloodwork last week.     Musculoskeletal Problem     bilateral knee pain and right foot pain. Questions meloxocam       Initial /72 (BP Location: Left arm, Patient Position: Sitting, Cuff Size: Adult Regular)  Pulse 72  Temp 97.8  F (36.6  C) (Tympanic)  Resp 14  Ht 5' 7.5\" (1.715 m)  SpO2 97% Estimated body mass index is 23.76 kg/(m^2) as calculated from the following:    Height as of 7/20/17: 5' 7.5\" (1.715 m).    Weight as of 7/20/17: 154 lb (69.9 kg).  Medication Reconciliation: griselda Pal      "

## 2018-01-22 NOTE — NURSING NOTE
"Chief Complaint   Patient presents with     Hypertension     Hyperlipidemia     Patient completed bloodwork last week.     Musculoskeletal Problem     bilateral knee pain and right foot pain. Questions meloxocam       Initial /72 (BP Location: Left arm, Patient Position: Sitting, Cuff Size: Adult Regular)  Pulse 72  Temp 97.8  F (36.6  C) (Tympanic)  Resp 14  Ht 5' 7.5\" (1.715 m)  Wt 161 lb (73 kg)  SpO2 97%  BMI 24.84 kg/m2 Estimated body mass index is 24.84 kg/(m^2) as calculated from the following:    Height as of this encounter: 5' 7.5\" (1.715 m).    Weight as of this encounter: 161 lb (73 kg).  Medication Reconciliation: complete   Nata Pal      "

## 2018-01-22 NOTE — PROGRESS NOTES
SUBJECTIVE:   Ten Flores is a 72 year old male who presents to clinic today for the following health issues:      Hyperlipidemia Follow-Up      Rate your low fat/cholesterol diet?: good    Taking statin?  Yes, no muscle aches from statin    Other lipid medications/supplements?:  Fish oil/Omega 3, dose  without side effects    Hypertension Follow-up      Outpatient blood pressures are being checked at home.  Results are good.    Low Salt Diet: no added salt          Amount of exercise or physical activity: Walked until foot started bothering him.    Problems taking medications regularly: Patient is currently not taking his BP medication    Medication side effects: Reports cramping from BP med.    Diet: low salt and low fat/cholesterol        Concern - Pain  Onset: A couple of weeks, knee pain and right foot for 3 weeks.    Description:   Bilateral knee pain, right foot pain    Intensity: moderate    Progression of Symptoms:  worsening    Accompanying Signs & Symptoms:  None    Previous history of similar problem:   Broke right heel in 4 places about 4 years ago    Precipitating factors:   Worsened by: Walking    Alleviating factors:  Improved by: Tylenol    Therapies Tried and outcome: Tylenol helps somewhat.        Problem list and histories reviewed & adjusted, as indicated.  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         Social History   Substance Use Topics     Smoking status: Former Smoker     Years: 5.00     Types: Cigarettes     Smokeless tobacco: Former User      Comment: Tried to Quit (YES)  "    Alcohol use 0.0 oz/week      Comment: DAILY,wine,beer     Family History   Problem Relation Age of Onset     HEART DISEASE Mother 68     Disease - Cause of Death     HEART DISEASE Father 89     Disease - Cause of Death         Current Outpatient Prescriptions   Medication Sig Dispense Refill     meloxicam (MOBIC) 7.5 MG tablet Take 1 tablet (7.5 mg) by mouth daily 90 tablet 1     simvastatin (ZOCOR) 40 MG tablet TAKE 1 TABLET BY MOUTH AT BEDTIME 90 tablet 1     budesonide (RINOCORT AQUA) 32 MCG/ACT spray Spray 2 sprays into both nostrils daily 1 Bottle 11     acetaminophen (TYLENOL) 500 MG tablet Take 2 tablets (1,000 mg) by mouth every 8 hours as needed 100 tablet 1     aspirin 81 MG tablet Take by mouth daily       fish oil-omega-3 fatty acids (FISH OIL) 1000 MG capsule Take 2 g by mouth daily        HYDROcodone-acetaminophen (NORCO) 5-325 MG per tablet Take 1 tablet by mouth every 6 hours as needed for moderate to severe pain (Patient not taking: Reported on 1/22/2018) 10 tablet 0     Allergies   Allergen Reactions     Sulfasuccinamide Sodium Hives     Sulfa Drugs Rash         Reviewed and updated as needed this visit by clinical staffTobacco       Reviewed and updated as needed this visit by Provider         ROS:  Constitutional, HEENT, cardiovascular, pulmonary, gi and gu systems are negative, except as otherwise noted.      OBJECTIVE:   /72 (BP Location: Left arm, Patient Position: Sitting, Cuff Size: Adult Regular)  Pulse 72  Temp 97.8  F (36.6  C) (Tympanic)  Resp 14  Ht 5' 7.5\" (1.715 m)  Wt 161 lb (73 kg)  SpO2 97%  BMI 24.84 kg/m2  Body mass index is 24.84 kg/(m^2).  GENERAL: healthy, alert and no distress  PSYCH: mentation appears normal, affect normal/bright    Diagnostic Test Results:  Results for orders placed or performed in visit on 01/17/18   Basic metabolic panel   Result Value Ref Range    Sodium 141 133 - 144 mmol/L    Potassium 4.4 3.4 - 5.3 mmol/L    Chloride 105 94 - 109 " mmol/L    Carbon Dioxide 25 20 - 32 mmol/L    Anion Gap 11 3 - 14 mmol/L    Glucose 102 (H) 70 - 99 mg/dL    Urea Nitrogen 18 7 - 30 mg/dL    Creatinine 1.17 0.66 - 1.25 mg/dL    GFR Estimate 61 >60 mL/min/1.7m2    GFR Estimate If Black 74 >60 mL/min/1.7m2    Calcium 9.1 8.5 - 10.1 mg/dL   Lipid Profile   Result Value Ref Range    Cholesterol 182 <200 mg/dL    Triglycerides 151 (H) <150 mg/dL    HDL Cholesterol 50 >39 mg/dL    LDL Cholesterol Calculated 102 (H) <100 mg/dL    Non HDL Cholesterol 132 (H) <130 mg/dL   ALT   Result Value Ref Range    ALT 46 0 - 70 U/L   Uric acid   Result Value Ref Range    Uric Acid 5.8 3.5 - 7.2 mg/dL       ASSESSMENT/PLAN:     Hyperlipidemia; controlled   Plan:  No changes in the patient's current treatment plan    Hypertension; controlled   Associated with the following complications:    None   Plan:  Stay off BP medication for now.  Patient does check readings at home.  He will notify clinic if readings are elevated, and we will consider different medication.          ICD-10-CM    1. Hypercholesterolemia E78.00    2. Benign essential hypertension I10    3. Right foot pain M79.671 meloxicam (MOBIC) 7.5 MG tablet   4. Screening for prostate cancer Z12.5 PSA, screen       FUTURE APPOINTMENTS:       - Follow-up visit in 6 months, sooner as needed.      Hodan Tapia MD  [facility]

## 2018-01-23 ASSESSMENT — ANXIETY QUESTIONNAIRES: GAD7 TOTAL SCORE: 0

## 2018-03-29 ENCOUNTER — OFFICE VISIT (OUTPATIENT)
Dept: FAMILY MEDICINE | Facility: OTHER | Age: 73
End: 2018-03-29
Attending: FAMILY MEDICINE
Payer: COMMERCIAL

## 2018-03-29 ENCOUNTER — RADIANT APPOINTMENT (OUTPATIENT)
Dept: GENERAL RADIOLOGY | Facility: OTHER | Age: 73
End: 2018-03-29
Attending: FAMILY MEDICINE
Payer: COMMERCIAL

## 2018-03-29 VITALS
HEIGHT: 68 IN | TEMPERATURE: 99.1 F | HEART RATE: 85 BPM | DIASTOLIC BLOOD PRESSURE: 72 MMHG | SYSTOLIC BLOOD PRESSURE: 144 MMHG | WEIGHT: 161 LBS | RESPIRATION RATE: 16 BRPM | BODY MASS INDEX: 24.4 KG/M2 | OXYGEN SATURATION: 97 %

## 2018-03-29 DIAGNOSIS — Z13.6 SCREENING FOR AAA (ABDOMINAL AORTIC ANEURYSM): ICD-10-CM

## 2018-03-29 DIAGNOSIS — Z87.891 HISTORY OF SMOKING: ICD-10-CM

## 2018-03-29 DIAGNOSIS — G89.29 CHRONIC RIGHT SHOULDER PAIN: Primary | ICD-10-CM

## 2018-03-29 DIAGNOSIS — M25.511 CHRONIC RIGHT SHOULDER PAIN: Primary | ICD-10-CM

## 2018-03-29 PROCEDURE — G0463 HOSPITAL OUTPT CLINIC VISIT: HCPCS

## 2018-03-29 PROCEDURE — 73030 X-RAY EXAM OF SHOULDER: CPT | Mod: TC,RT,FY

## 2018-03-29 PROCEDURE — 99214 OFFICE O/P EST MOD 30 MIN: CPT | Performed by: FAMILY MEDICINE

## 2018-03-29 ASSESSMENT — ANXIETY QUESTIONNAIRES
4. TROUBLE RELAXING: NOT AT ALL
3. WORRYING TOO MUCH ABOUT DIFFERENT THINGS: NOT AT ALL
5. BEING SO RESTLESS THAT IT IS HARD TO SIT STILL: NOT AT ALL
7. FEELING AFRAID AS IF SOMETHING AWFUL MIGHT HAPPEN: NOT AT ALL
2. NOT BEING ABLE TO STOP OR CONTROL WORRYING: NOT AT ALL
GAD7 TOTAL SCORE: 0
1. FEELING NERVOUS, ANXIOUS, OR ON EDGE: NOT AT ALL
6. BECOMING EASILY ANNOYED OR IRRITABLE: NOT AT ALL

## 2018-03-29 NOTE — MR AVS SNAPSHOT
After Visit Summary   3/29/2018    Ten Flores    MRN: [id number]           Patient Information     Date Of Birth          1945        Visit Information        Provider Department      3/29/2018 3:30 PM Hodan Tapia MD [facility]        Today's Diagnoses     Chronic right shoulder pain    -  1    Screening for AAA (abdominal aortic aneurysm)        History of smoking           Follow-ups after your visit        Follow-up notes from your care team     Return if symptoms worsen or fail to improve.      Your next 10 appointments already scheduled     Jun 22, 2018  1:00 PM CDT   (Arrive by 12:45 PM)   Hearing Eval with Sammi Morales   [facility] [place] ([facility] [place] )    [address]  [place] [address]   [phone]            Jul 23, 2018  9:45 AM CDT   (Arrive by 9:30 AM)   SHORT with Hodan Tapia MD   [facility] ([facility] )    [address]  [facility] [address]   [phone]              Who to contact     If you have questions or need follow up information about today's clinic visit or your schedule please contact [facility] directly at [phone].  Normal or non-critical lab and imaging results will be communicated to you by MyChart, letter or phone within 4 business days after the clinic has received the results. If you do not hear from us within 7 days, please contact the clinic through MyChart or phone. If you have a critical or abnormal lab result, we will notify you by phone as soon as possible.  Submit refill requests through [employer] or call your pharmacy and they will forward the refill request to us. Please allow 3 business days for your refill to be completed.          Additional Information About Your Visit        MyChart Information     [employer] lets you send messages to your doctor, view your test results, renew your prescriptions, schedule  "appointments and more. To sign up, go to [email or web address] . Click on \"Log in\" on the left side of the screen, which will take you to the Welcome page. Then click on \"Sign up Now\" on the right side of the page.     You will be asked to enter the access code listed below, as well as some personal information. Please follow the directions to create your username and password.     Your access code is: [email or web address]  Expires: 2018 12:45 PM     Your access code will  in 90 days. If you need help or a new code, please call your [facility] clinic or [phone].        Care EveryWhere ID     This is your Care EveryWhere ID. This could be used by other organizations to access your [facility] medical records  [id number]        Your Vitals Were     Pulse Temperature Respirations Height Pulse Oximetry BMI (Body Mass Index)    85 99.1  F (37.3  C) (Tympanic) 16 5' 7.5\" (1.715 m) 97% 24.84 kg/m2       Blood Pressure from Last 3 Encounters:   18 144/72   18 128/72   17 136/82    Weight from Last 3 Encounters:   18 161 lb (73 kg)   18 161 lb (73 kg)   17 154 lb (69.9 kg)              We Performed the Following     Abdominal Aortic Aneurysm Screening/Tracking     XR Shoulder Right G/E 3 Views          Today's Medication Changes          These changes are accurate as of 3/29/18 11:59 PM.  If you have any questions, ask your nurse or doctor.               Start taking these medicines.        Dose/Directions    acetaminophen-codeine 300-30 MG per tablet   Commonly known as:  TYLENOL #3   Used for:  Chronic right shoulder pain   Started by:  Hodan Tapia MD        Dose:  1 tablet   Take 1 tablet by mouth every 6 hours as needed for severe pain maximum 6 tablet(s) per day   Quantity:  20 tablet   Refills:  0         Stop taking these medicines if you haven't already. Please contact your care team if you have questions.     HYDROcodone-acetaminophen 5-325 MG per tablet   Commonly " known as:  CARLYLE   Stopped by:  Hodan Tapia MD                Where to get your medicines      Some of these will need a paper prescription and others can be bought over the counter.  Ask your nurse if you have questions.     Bring a paper prescription for each of these medications     acetaminophen-codeine 300-30 MG per tablet                Primary Care Provider Office Phone # Fax #    Hodan Tapia [phone] [address] [facility] [address]        Equal Access to Services     [facility]: Hadii aad ku hadasho Soomaali, waaxda luqadaha, qaybta kaalmada adeegyada, waxay idiin hayaan adeeg micky marion . So [facility] [phone].    ATENCIÓN: Si habla español, tiene a kincaid disposición servicios gratuitos de asistencia lingüística. [facility] [phone].    We comply with applicable federal civil rights laws and [place] laws. We do not discriminate on the basis of race, color, national origin, age, disability, sex, sexual orientation, or gender identity.            Thank you!     Thank you for choosing [facility]  for your care. Our goal is always to provide you with excellent care. Hearing back from our patients is one way we can continue to improve our services. Please take a few minutes to complete the written survey that you may receive in the mail after your visit with us. Thank you!             Your Updated Medication List - Protect others around you: Learn how to safely use, store and throw away your medicines at www.disposemymeds.org.          This list is accurate as of 3/29/18 11:59 PM.  Always use your most recent med list.                   Brand Name Dispense Instructions for use Diagnosis    acetaminophen 500 MG tablet    TYLENOL    100 tablet    Take 2 tablets (1,000 mg) by mouth every 8 hours as needed    Hip pain, right       acetaminophen-codeine 300-30 MG per tablet    TYLENOL #3    20 tablet    Take 1 tablet by mouth every 6  hours as needed for severe pain maximum 6 tablet(s) per day    Chronic right shoulder pain       aspirin 81 MG tablet      Take by mouth daily        budesonide 32 MCG/ACT spray    RINOCORT AQUA    1 Bottle    Spray 2 sprays into both nostrils daily    Chronic rhinitis, Nasal turbinate hypertrophy       fish oil-omega-3 fatty acids 1000 MG capsule      Take 2 g by mouth daily        meloxicam 7.5 MG tablet    MOBIC    90 tablet    Take 1 tablet (7.5 mg) by mouth daily    Right foot pain       simvastatin 40 MG tablet    ZOCOR    90 tablet    TAKE 1 TABLET BY MOUTH AT BEDTIME    Hypercholesterolemia

## 2018-03-29 NOTE — NURSING NOTE
"Chief Complaint   Patient presents with     Musculoskeletal Problem     right shoulder pain       Initial /72 (BP Location: Right arm, Patient Position: Sitting, Cuff Size: Adult Large)  Pulse 85  Temp 99.1  F (37.3  C) (Tympanic)  Resp 16  Ht 5' 7.5\" (1.715 m)  Wt 161 lb (73 kg)  SpO2 97%  BMI 24.84 kg/m2 Estimated body mass index is 24.84 kg/(m^2) as calculated from the following:    Height as of this encounter: 5' 7.5\" (1.715 m).    Weight as of this encounter: 161 lb (73 kg).  Medication Reconciliation: complete     Celsa Peguero      "

## 2018-03-29 NOTE — PROGRESS NOTES
SUBJECTIVE:   Ten Flores is a 72 year old male who presents to clinic today for the following health issues:      Joint Pain    Onset: one month    Description:   Location: right shoulder  Character: Sharp and Cramping    Intensity: moderate    Progression of Symptoms: better    Accompanying Signs & Symptoms:  Other symptoms: radiation of pain to shoulder blade    History:   Previous similar pain: no       Precipitating factors:   Trauma or overuse: YES- may be due to shoveling, overuse    Alleviating factors:  Improved by: nothing    Therapies Tried and outcome: nothing tried        Patient is due for AAA screening.  He does have a remote history of smoking, but did smoke over 100 cigarettes in his lifetime.      Problem list and histories reviewed & adjusted, as indicated.  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         Social History   Substance Use Topics     Smoking status: Former Smoker     Years: 5.00     Types: Cigarettes     Smokeless tobacco: Former User      Comment: Tried to Quit (YES)     Alcohol use 0.0 oz/week      Comment: DAILY,wine,beer     Family History   Problem Relation Age of Onset     HEART DISEASE Mother 68     Disease - Cause of Death     HEART DISEASE Father 89     Disease - Cause of Death         Current Outpatient Prescriptions   Medication Sig Dispense Refill     acetaminophen-codeine (TYLENOL #3) 300-30 MG per tablet Take 1 tablet by mouth every 6 hours as needed for severe pain maximum 6 tablet(s) per day 20 tablet 0     simvastatin (ZOCOR) 40 MG tablet TAKE 1 TABLET BY  "MOUTH AT BEDTIME 90 tablet 1     budesonide (RINOCORT AQUA) 32 MCG/ACT spray Spray 2 sprays into both nostrils daily 1 Bottle 11     acetaminophen (TYLENOL) 500 MG tablet Take 2 tablets (1,000 mg) by mouth every 8 hours as needed 100 tablet 1     aspirin 81 MG tablet Take by mouth daily       meloxicam (MOBIC) 7.5 MG tablet Take 1 tablet (7.5 mg) by mouth daily (Patient not taking: Reported on 3/29/2018) 90 tablet 1     fish oil-omega-3 fatty acids (FISH OIL) 1000 MG capsule Take 2 g by mouth daily        Allergies   Allergen Reactions     Sulfasuccinamide Sodium Hives     Sulfa Drugs Rash       Reviewed and updated as needed this visit by clinical staff  Tobacco  Allergies  Meds  Problems  Med Hx  Surg Hx  Fam Hx  Soc Hx        Reviewed and updated as needed this visit by Provider         ROS:  Constitutional, HEENT, cardiovascular, pulmonary, gi and gu systems are negative, except as otherwise noted.    OBJECTIVE:     /72 (BP Location: Right arm, Patient Position: Sitting, Cuff Size: Adult Large)  Pulse 85  Temp 99.1  F (37.3  C) (Tympanic)  Resp 16  Ht 5' 7.5\" (1.715 m)  Wt 161 lb (73 kg)  SpO2 97%  BMI 24.84 kg/m2  Body mass index is 24.84 kg/(m^2).  GENERAL: healthy, alert and no distress  MS: full painfree ROM at shoulder, patient indicates pain along acromion and in long head of biceps, he notes previous biceps tendon rupture (must be partial as no repair was indicated)  PSYCH: mentation appears normal, affect normal/bright    Diagnostic Test Results:  Xray - AC arthritis noted    ASSESSMENT/PLAN:     1. Chronic right shoulder pain  Patient already has Orthopedics appointment made.  Recommended he keep this appointment.  Few additional pain pills given, patient uses then very sparingly.  Follow-up here as directed.  - XR Shoulder Right G/E 3 Views  - acetaminophen-codeine (TYLENOL #3) 300-30 MG per tablet; Take 1 tablet by mouth every 6 hours as needed for severe pain maximum 6 tablet(s) " per day  Dispense: 20 tablet; Refill: 0    2. Screening for AAA (abdominal aortic aneurysm)  US ordered.  - Abdominal Aortic Aneurysm Screening/Tracking  - US Aorta Medicare AAA Screening; Future    3. History of smoking  - Abdominal Aortic Aneurysm Screening/Tracking  - US Aorta Medicare AAA Screening; Future        Hodan Tapia MD  [facility]

## 2018-03-30 ASSESSMENT — ANXIETY QUESTIONNAIRES: GAD7 TOTAL SCORE: 0

## 2018-03-30 ASSESSMENT — PATIENT HEALTH QUESTIONNAIRE - PHQ9: SUM OF ALL RESPONSES TO PHQ QUESTIONS 1-9: 0

## 2018-04-02 ENCOUNTER — DOCUMENTATION ONLY (OUTPATIENT)
Dept: VASCULAR SURGERY | Facility: CLINIC | Age: 73
End: 2018-04-02

## 2018-04-05 ENCOUNTER — HOSPITAL ENCOUNTER (OUTPATIENT)
Dept: ULTRASOUND IMAGING | Facility: HOSPITAL | Age: 73
Discharge: HOME | End: 2018-04-05
Attending: FAMILY MEDICINE | Admitting: FAMILY MEDICINE
Payer: COMMERCIAL

## 2018-04-05 DIAGNOSIS — Z87.891 HISTORY OF SMOKING: ICD-10-CM

## 2018-04-05 DIAGNOSIS — Z13.6 SCREENING FOR AAA (ABDOMINAL AORTIC ANEURYSM): ICD-10-CM

## 2018-04-05 PROCEDURE — 76706 US ABDL AORTA SCREEN AAA: CPT | Mod: TC

## 2018-04-06 ENCOUNTER — TRANSFERRED RECORDS (OUTPATIENT)
Dept: HEALTH INFORMATION MANAGEMENT | Facility: CLINIC | Age: 73
End: 2018-04-06

## 2018-04-06 ENCOUNTER — DOCUMENTATION ONLY (OUTPATIENT)
Dept: VASCULAR SURGERY | Facility: CLINIC | Age: 73
End: 2018-04-06

## 2018-04-13 ENCOUNTER — HOSPITAL ENCOUNTER (OUTPATIENT)
Dept: MRI IMAGING | Facility: HOSPITAL | Age: 73
Discharge: HOME | End: 2018-04-13
Attending: ORTHOPAEDIC SURGERY | Admitting: ORTHOPAEDIC SURGERY
Payer: COMMERCIAL

## 2018-04-13 DIAGNOSIS — S46.219A BICEPS MUSCLE TEAR: ICD-10-CM

## 2018-04-13 PROCEDURE — 73221 MRI JOINT UPR EXTREM W/O DYE: CPT | Mod: TC,RT

## 2018-04-23 ENCOUNTER — TRANSFERRED RECORDS (OUTPATIENT)
Dept: HEALTH INFORMATION MANAGEMENT | Facility: CLINIC | Age: 73
End: 2018-04-23

## 2018-06-19 DIAGNOSIS — H91.93 DECREASED HEARING OF BOTH EARS: Primary | ICD-10-CM

## 2018-06-22 ENCOUNTER — OFFICE VISIT (OUTPATIENT)
Dept: AUDIOLOGY | Facility: OTHER | Age: 73
End: 2018-06-22
Attending: AUDIOLOGIST
Payer: COMMERCIAL

## 2018-06-22 DIAGNOSIS — H90.3 SENSORINEURAL HEARING LOSS, BILATERAL: Primary | ICD-10-CM

## 2018-06-22 PROCEDURE — 92556 SPEECH AUDIOMETRY COMPLETE: CPT | Performed by: AUDIOLOGIST

## 2018-06-22 PROCEDURE — 92552 PURE TONE AUDIOMETRY AIR: CPT | Performed by: AUDIOLOGIST

## 2018-06-22 NOTE — MR AVS SNAPSHOT
After Visit Summary   6/22/2018    Ten Flores    MRN: [id number]           Patient Information     Date Of Birth          1945        Visit Information        Provider Department      6/22/2018 1:00 PM [facility]        Today's Diagnoses     Sensorineural hearing loss, bilateral    -  1       Follow-ups after your visit        Your next 10 appointments already scheduled     Jul 23, 2018  9:45 AM CDT   (Arrive by 9:30 AM)   SHORT with Hodan Tapia MD   [facility] ([facility] )    [address] [facility] [address]   [phone]              Who to contact     If you have questions or need follow up information about today's clinic visit or your schedule please contact [facility] directly at [phone].  Normal or non-critical lab and imaging results will be communicated to you by MyChart, letter or phone within 4 business days after the clinic has received the results. If you do not hear from us within 7 days, please contact the clinic through MyChart or phone. If you have a critical or abnormal lab result, we will notify you by phone as soon as possible.  Submit refill requests through [employer] or call your pharmacy and they will forward the refill request to us. Please allow 3 business days for your refill to be completed.          Additional Information About Your Visit        Care EveryWhere ID     This is your Care EveryWhere ID. This could be used by other organizations to access your [facility] medical records  [id number]         Blood Pressure from Last 3 Encounters:   03/29/18 144/72   01/22/18 128/72   07/20/17 136/82    Weight from Last 3 Encounters:   03/29/18 161 lb (73 kg)   01/22/18 161 lb (73 kg)   07/20/17 154 lb (69.9 kg)              We Performed the Following     AUDIOGRAM/TYMPANOGRAM - INTERFACE        Primary Care Provider Office Phone # Fax #    Hodan Chatman  MD [place] [phone]       [address] [facility] [address]        Equal Access to Services     ALBERT PEREZ : Hadii new Cardona, miguel rogersshaneha, carinadeyanira queenyudytrenton real, waxsusie markusin hayaaaugusta brookeamishbairon louise. So [facility] [phone].    ATENCIÓN: Si habla español, tiene a kincaid disposición servicios gratuitos de asistencia lingüística. Llame al [phone].    We comply with applicable federal civil rights laws and [place] laws. We do not discriminate on the basis of race, color, national origin, age, disability, sex, sexual orientation, or gender identity.            Thank you!     Thank you for choosing [facility]  for your care. Our goal is always to provide you with excellent care. Hearing back from our patients is one way we can continue to improve our services. Please take a few minutes to complete the written survey that you may receive in the mail after your visit with us. Thank you!             Your Updated Medication List - Protect others around you: Learn how to safely use, store and throw away your medicines at www.disposemymeds.org.          This list is accurate as of 6/22/18  1:21 PM.  Always use your most recent med list.                   Brand Name Dispense Instructions for use Diagnosis    acetaminophen 500 MG tablet    TYLENOL    100 tablet    Take 2 tablets (1,000 mg) by mouth every 8 hours as needed    Hip pain, right       acetaminophen-codeine 300-30 MG per tablet    TYLENOL #3    20 tablet    Take 1 tablet by mouth every 6 hours as needed for severe pain maximum 6 tablet(s) per day    Chronic right shoulder pain       aspirin 81 MG tablet      Take by mouth daily        budesonide 32 MCG/ACT spray    RINOCORT AQUA    1 Bottle    Spray 2 sprays into both nostrils daily    Chronic rhinitis, Nasal turbinate hypertrophy       fish oil-omega-3 fatty acids 1000 MG capsule      Take 2 g by mouth daily        meloxicam 7.5 MG tablet     MOBIC    90 tablet    Take 1 tablet (7.5 mg) by mouth daily    Right foot pain       simvastatin 40 MG tablet    ZOCOR    90 tablet    TAKE 1 TABLET BY MOUTH AT BEDTIME    Hypercholesterolemia

## 2018-06-22 NOTE — PROGRESS NOTES
Audiology Evaluation Completed. Please refer SCANNED AUDIOGRAM and/or TYMPANOGRAM for BACKGROUND, RESULTS, RECOMMENDATIONS.  Sadie Romero M.S., [facility]  Audiologist #[id number]

## 2018-07-19 DIAGNOSIS — E78.00 HYPERCHOLESTEROLEMIA: ICD-10-CM

## 2018-07-19 DIAGNOSIS — I10 BENIGN ESSENTIAL HYPERTENSION: ICD-10-CM

## 2018-07-19 LAB
ALT SERPL W P-5'-P-CCNC: 42 U/L (ref 0–70)
ANION GAP SERPL CALCULATED.3IONS-SCNC: 7 MMOL/L (ref 3–14)
BUN SERPL-MCNC: 20 MG/DL (ref 7–30)
CALCIUM SERPL-MCNC: 9.2 MG/DL (ref 8.5–10.1)
CHLORIDE SERPL-SCNC: 104 MMOL/L (ref 94–109)
CHOLEST SERPL-MCNC: 171 MG/DL
CO2 SERPL-SCNC: 28 MMOL/L (ref 20–32)
CREAT SERPL-MCNC: 1.11 MG/DL (ref 0.66–1.25)
GFR SERPL CREATININE-BSD FRML MDRD: 65 ML/MIN/1.7M2
GLUCOSE SERPL-MCNC: 104 MG/DL (ref 70–99)
HDLC SERPL-MCNC: 51 MG/DL
LDLC SERPL CALC-MCNC: 103 MG/DL
NONHDLC SERPL-MCNC: 120 MG/DL
POTASSIUM SERPL-SCNC: 4.2 MMOL/L (ref 3.4–5.3)
SODIUM SERPL-SCNC: 139 MMOL/L (ref 133–144)
TRIGL SERPL-MCNC: 86 MG/DL

## 2018-07-19 PROCEDURE — 80061 LIPID PANEL: CPT | Mod: ZL | Performed by: FAMILY MEDICINE

## 2018-07-19 PROCEDURE — 84460 ALANINE AMINO (ALT) (SGPT): CPT | Mod: ZL | Performed by: FAMILY MEDICINE

## 2018-07-19 PROCEDURE — 80048 BASIC METABOLIC PNL TOTAL CA: CPT | Mod: ZL | Performed by: FAMILY MEDICINE

## 2018-07-19 PROCEDURE — 36415 COLL VENOUS BLD VENIPUNCTURE: CPT | Mod: ZL | Performed by: FAMILY MEDICINE

## 2018-07-23 ENCOUNTER — OFFICE VISIT (OUTPATIENT)
Dept: FAMILY MEDICINE | Facility: OTHER | Age: 73
End: 2018-07-23
Attending: FAMILY MEDICINE
Payer: COMMERCIAL

## 2018-07-23 VITALS
TEMPERATURE: 98.2 F | WEIGHT: 165 LBS | BODY MASS INDEX: 25.01 KG/M2 | OXYGEN SATURATION: 96 % | DIASTOLIC BLOOD PRESSURE: 84 MMHG | HEIGHT: 68 IN | SYSTOLIC BLOOD PRESSURE: 128 MMHG | HEART RATE: 68 BPM

## 2018-07-23 DIAGNOSIS — M79.671 RIGHT FOOT PAIN: ICD-10-CM

## 2018-07-23 DIAGNOSIS — Z12.11 COLON CANCER SCREENING: ICD-10-CM

## 2018-07-23 DIAGNOSIS — E78.00 HYPERCHOLESTEROLEMIA: Primary | ICD-10-CM

## 2018-07-23 DIAGNOSIS — I10 BENIGN ESSENTIAL HYPERTENSION: ICD-10-CM

## 2018-07-23 PROCEDURE — 99213 OFFICE O/P EST LOW 20 MIN: CPT | Performed by: FAMILY MEDICINE

## 2018-07-23 PROCEDURE — G0463 HOSPITAL OUTPT CLINIC VISIT: HCPCS

## 2018-07-23 RX ORDER — SIMVASTATIN 40 MG
40 TABLET ORAL AT BEDTIME
Qty: 90 TABLET | Refills: 1 | Status: SHIPPED | OUTPATIENT
Start: 2018-07-23 | End: 2019-01-25

## 2018-07-23 RX ORDER — MELOXICAM 7.5 MG/1
7.5 TABLET ORAL DAILY
Qty: 90 TABLET | Refills: 1 | Status: SHIPPED | OUTPATIENT
Start: 2018-07-23 | End: 2019-01-25

## 2018-07-23 ASSESSMENT — ANXIETY QUESTIONNAIRES
3. WORRYING TOO MUCH ABOUT DIFFERENT THINGS: NOT AT ALL
6. BECOMING EASILY ANNOYED OR IRRITABLE: NOT AT ALL
7. FEELING AFRAID AS IF SOMETHING AWFUL MIGHT HAPPEN: NOT AT ALL
1. FEELING NERVOUS, ANXIOUS, OR ON EDGE: NOT AT ALL
GAD7 TOTAL SCORE: 0
4. TROUBLE RELAXING: NOT AT ALL
2. NOT BEING ABLE TO STOP OR CONTROL WORRYING: NOT AT ALL
5. BEING SO RESTLESS THAT IT IS HARD TO SIT STILL: NOT AT ALL

## 2018-07-23 ASSESSMENT — PAIN SCALES - GENERAL: PAINLEVEL: MILD PAIN (3)

## 2018-07-23 NOTE — PROGRESS NOTES
SUBJECTIVE:   Ten Flores is a 73 year old male who presents to clinic today for the following health issues:      Hyperlipidemia Follow-Up      Rate your low fat/cholesterol diet?: fair    Taking statin?  Yes, muscle aches, possibly from other cause    Other lipid medications/supplements?:  none    Hypertension Follow-up      Outpatient blood pressures are not being checked.    Low Salt Diet: no added salt      Amount of exercise or physical activity: 6-7 days/week for an average of 30 minutes    Problems taking medications regularly: No    Medication side effects: none    Diet: low salt        Patient declines colon cancer screening (colonoscopy, Cologuard, or ifobt).      Problem list and histories reviewed & adjusted, as indicated.  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         Social History   Substance Use Topics     Smoking status: Former Smoker     Packs/day: 0.50     Years: 5.00     Types: Cigarettes     Quit date: 1/1/1965     Smokeless tobacco: Former User      Comment: Tried to Quit (YES)     Alcohol use 0.0 oz/week      Comment: DAILY,wine,beer     Family History   Problem Relation Age of Onset     HEART DISEASE Mother 68     Disease - Cause of Death     HEART DISEASE Father 89     Disease - Cause of Death         Current Outpatient Prescriptions   Medication Sig Dispense Refill     acetaminophen (TYLENOL) 500 MG tablet Take 2 tablets (1,000 mg) by mouth every 8 hours as needed 100 tablet 1     acetaminophen-codeine (TYLENOL #3) 300-30 MG per tablet Take 1 tablet by mouth every 6 hours as  "needed for severe pain maximum 6 tablet(s) per day 20 tablet 0     aspirin 81 MG tablet Take by mouth daily       budesonide (RINOCORT AQUA) 32 MCG/ACT spray Spray 2 sprays into both nostrils daily 1 Bottle 11     fish oil-omega-3 fatty acids (FISH OIL) 1000 MG capsule Take 2 g by mouth daily        meloxicam (MOBIC) 7.5 MG tablet Take 1 tablet (7.5 mg) by mouth daily 90 tablet 1     simvastatin (ZOCOR) 40 MG tablet Take 1 tablet (40 mg) by mouth At Bedtime 90 tablet 1     Allergies   Allergen Reactions     Sulfasuccinamide Sodium Hives     Sulfa Drugs Rash       Reviewed and updated as needed this visit by clinical staff  Tobacco  Allergies  Meds  Med Hx  Surg Hx  Fam Hx  Soc Hx      Reviewed and updated as needed this visit by Provider         ROS:  Constitutional, HEENT, cardiovascular, pulmonary, gi and gu systems are negative, except as otherwise noted.    OBJECTIVE:     /84  Pulse 68  Temp 98.2  F (36.8  C) (Tympanic)  Ht 5' 7.5\" (1.715 m)  Wt 165 lb (74.8 kg)  SpO2 96%  BMI 25.46 kg/m2  Body mass index is 25.46 kg/(m^2).  GENERAL: healthy, alert and no distress  PSYCH: mentation appears normal, affect normal/bright    Diagnostic Test Results:  Results for orders placed or performed in visit on 07/19/18   Basic metabolic panel   Result Value Ref Range    Sodium 139 133 - 144 mmol/L    Potassium 4.2 3.4 - 5.3 mmol/L    Chloride 104 94 - 109 mmol/L    Carbon Dioxide 28 20 - 32 mmol/L    Anion Gap 7 3 - 14 mmol/L    Glucose 104 (H) 70 - 99 mg/dL    Urea Nitrogen 20 7 - 30 mg/dL    Creatinine 1.11 0.66 - 1.25 mg/dL    GFR Estimate 65 >60 mL/min/1.7m2    GFR Estimate If Black 78 >60 mL/min/1.7m2    Calcium 9.2 8.5 - 10.1 mg/dL   Lipid Profile   Result Value Ref Range    Cholesterol 171 <200 mg/dL    Triglycerides 86 <150 mg/dL    HDL Cholesterol 51 >39 mg/dL    LDL Cholesterol Calculated 103 (H) <100 mg/dL    Non HDL Cholesterol 120 <130 mg/dL   ALT   Result Value Ref Range    ALT 42 0 - 70 U/L    "     ASSESSMENT/PLAN:     Hyperlipidemia; controlled   Plan:  No changes in the patient's current treatment plan    Hypertension; controlled   Associated with the following complications:    None   Plan:  No changes in the patient's current treatment plan        ICD-10-CM    1. Hypercholesterolemia E78.00 simvastatin (ZOCOR) 40 MG tablet   2. Benign essential hypertension I10    3. Right foot pain M79.671 meloxicam (MOBIC) 7.5 MG tablet   4. Colon cancer screening Z12.11        FUTURE APPOINTMENTS:       - Follow-up visit in 6 months, sooner as needed.      Hodan Tapia MD  [facility]

## 2018-07-23 NOTE — MR AVS SNAPSHOT
After Visit Summary   7/23/2018    Ten Flores    MRN: [id number]           Patient Information     Date Of Birth          1945        Visit Information        Provider Department      7/23/2018 9:45 AM Hodan Tapia MD [facility]        Today's Diagnoses     Hypercholesterolemia    -  1    Benign essential hypertension        Right foot pain        Colon cancer screening           Follow-ups after your visit        Follow-up notes from your care team     Return in about 6 months (around 1/23/2019).      Your next 10 appointments already scheduled     Jan 25, 2019  9:45 AM CST   (Arrive by 9:30 AM)   SHORT with Hodan Tapia MD   [facility] ([facility] )    [address]  [facility] [address]   [phone]            Jun 26, 2019  1:00 PM CDT   (Arrive by 12:45 PM)   New Visit with Sammi Morales   [facility] ([facility] )    [address] [place] [address]   [phone]              Who to contact     If you have questions or need follow up information about today's clinic visit or your schedule please contact [facility] directly at [phone].  Normal or non-critical lab and imaging results will be communicated to you by MyChart, letter or phone within 4 business days after the clinic has received the results. If you do not hear from us within 7 days, please contact the clinic through MyChart or phone. If you have a critical or abnormal lab result, we will notify you by phone as soon as possible.  Submit refill requests through [employer] or call your pharmacy and they will forward the refill request to us. Please allow 3 business days for your refill to be completed.          Additional Information About Your Visit        Care EveryWhere ID     This is your Care EveryWhere ID. This could be used by other organizations to access your [facility]  "records  [id number]        Your Vitals Were     Pulse Temperature Height Pulse Oximetry BMI (Body Mass Index)       68 98.2  F (36.8  C) (Tympanic) 5' 7.5\" (1.715 m) 96% 25.46 kg/m2        Blood Pressure from Last 3 Encounters:   07/23/18 128/84   03/29/18 144/72   01/22/18 128/72    Weight from Last 3 Encounters:   07/23/18 165 lb (74.8 kg)   03/29/18 161 lb (73 kg)   01/22/18 161 lb (73 kg)              Today, you had the following     No orders found for display         Today's Medication Changes          These changes are accurate as of 7/23/18 11:59 PM.  If you have any questions, ask your nurse or doctor.               These medicines have changed or have updated prescriptions.        Dose/Directions    simvastatin 40 MG tablet   Commonly known as:  ZOCOR   This may have changed:  See the new instructions.   Used for:  Hypercholesterolemia   Changed by:  Hodan Tapia MD        Dose:  40 mg   Take 1 tablet (40 mg) by mouth At Bedtime   Quantity:  90 tablet   Refills:  1            Where to get your medicines      These medications were sent to [place] [address] [place] [address] [address]     Phone:  [phone]     meloxicam 7.5 MG tablet    simvastatin 40 MG tablet                Primary Care Provider Office Phone # Fax #    Hodan Tapia [phone] 234 beds at the [facility] [address]        Equal Access to Services     SHANIQUA PEREZ AH: Hadii aad ku hadasho Soomaali, waaxda luqadaha, qaybta kaalmada adeegyada, waxay markusin martin louise. So [facility] [phone].    ATENCIÓN: Si habla español, tiene a kincaid disposición servicios gratuitos de asistencia lingüística. Llame al [phone].    We comply with applicable federal civil rights laws and [place] laws. We do not discriminate on the basis of race, color, national origin, age, disability, sex, sexual orientation, or gender identity.          "   Thank you!     Thank you for choosing [facility]  for your care. Our goal is always to provide you with excellent care. Hearing back from our patients is one way we can continue to improve our services. Please take a few minutes to complete the written survey that you may receive in the mail after your visit with us. Thank you!             Your Updated Medication List - Protect others around you: Learn how to safely use, store and throw away your medicines at www.disposemymeds.org.          This list is accurate as of 7/23/18 11:59 PM.  Always use your most recent med list.                   Brand Name Dispense Instructions for use Diagnosis    acetaminophen 500 MG tablet    TYLENOL    100 tablet    Take 2 tablets (1,000 mg) by mouth every 8 hours as needed    Hip pain, right       acetaminophen-codeine 300-30 MG per tablet    TYLENOL #3    20 tablet    Take 1 tablet by mouth every 6 hours as needed for severe pain maximum 6 tablet(s) per day    Chronic right shoulder pain       aspirin 81 MG tablet      Take by mouth daily        budesonide 32 MCG/ACT spray    RINOCORT AQUA    1 Bottle    Spray 2 sprays into both nostrils daily    Chronic rhinitis, Nasal turbinate hypertrophy       fish oil-omega-3 fatty acids 1000 MG capsule      Take 2 g by mouth daily        meloxicam 7.5 MG tablet    MOBIC    90 tablet    Take 1 tablet (7.5 mg) by mouth daily    Right foot pain       simvastatin 40 MG tablet    ZOCOR    90 tablet    Take 1 tablet (40 mg) by mouth At Bedtime    Hypercholesterolemia

## 2018-07-23 NOTE — NURSING NOTE
"Chief Complaint   Patient presents with     Hypertension     Hyperlipidemia       Initial BP (!) 162/91 (BP Location: Left arm, Patient Position: Chair, Cuff Size: Adult Regular)  Pulse 68  Temp 98.2  F (36.8  C) (Tympanic)  Ht 5' 7.5\" (1.715 m)  Wt 165 lb (74.8 kg)  SpO2 96%  BMI 25.46 kg/m2 Estimated body mass index is 25.46 kg/(m^2) as calculated from the following:    Height as of this encounter: 5' 7.5\" (1.715 m).    Weight as of this encounter: 165 lb (74.8 kg).  Medication Reconciliation: complete    Chantell Rivas LPN  "

## 2018-07-24 ASSESSMENT — ANXIETY QUESTIONNAIRES: GAD7 TOTAL SCORE: 0

## 2018-07-24 ASSESSMENT — PATIENT HEALTH QUESTIONNAIRE - PHQ9: SUM OF ALL RESPONSES TO PHQ QUESTIONS 1-9: 0

## 2019-01-21 ENCOUNTER — TELEPHONE (OUTPATIENT)
Dept: FAMILY MEDICINE | Facility: OTHER | Age: 74
End: 2019-01-21

## 2019-01-21 DIAGNOSIS — I10 BENIGN ESSENTIAL HYPERTENSION: ICD-10-CM

## 2019-01-21 DIAGNOSIS — E78.00 HYPERCHOLESTEROLEMIA: Primary | ICD-10-CM

## 2019-01-21 NOTE — TELEPHONE ENCOUNTER
8:56 AM    Reason for Call: Phone Call    Description: Pt called and would like labs put in before his appt on Friday 01/25/2019    Was an appointment offered for this call? No  If yes : Appointment type              Date    Preferred method for responding to this message: Telephone Call  What is your phone number ?[phone]    If we cannot reach you directly, may we leave a detailed response at the number you provided? Yes    Can this message wait until your PCP/provider returns, if available today? Not applicable, provider is in    Mary Kwok

## 2019-01-22 NOTE — PROGRESS NOTES
SUBJECTIVE:                                                    Ten Flores is a 73 year old male who presents to clinic today for the following health issues:      Hyperlipidemia Follow-Up      Rate your low fat/cholesterol diet?: fair    Taking statin?  Yes, possible muscle aches from statin    Other lipid medications/supplements?:  Fish oil/Omega 3, dose 1000mg without side effects    Hypertension Follow-up      Outpatient blood pressures are being checked at home.  Results are 130-120/60-80.    Low Salt Diet: low salt      Amount of exercise or physical activity: 4-5 days/week for an average of 30-45 minutes    Problems taking medications regularly: No    Medication side effects: muscle aches    Diet: low salt and low fat/cholesterol    Patient would like refills of nasal spray and NSAID        Problem list and histories reviewed & adjusted, as indicated.  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         Social History     Tobacco Use     Smoking status: Former Smoker     Packs/day: 0.50     Years: 5.00     Pack years: 2.50     Types: Cigarettes     Last attempt to quit: 1965     Years since quittin.1     Smokeless tobacco: Former User     Tobacco comment: Tried to Quit (YES)   Substance Use Topics     Alcohol use: Yes     Alcohol/week: 0.0 oz     Comment: DAILY,wine,beer     Family History   Problem Relation Age of Onset     Heart Disease Mother 68        Disease - Cause of Death     Heart Disease Father 89        Disease - Cause of Death         Current Outpatient Medications   Medication  "Sig Dispense Refill     acetaminophen (TYLENOL) 500 MG tablet Take 2 tablets (1,000 mg) by mouth every 8 hours as needed 100 tablet 1     acetaminophen-codeine (TYLENOL #3) 300-30 MG per tablet Take 1 tablet by mouth every 6 hours as needed for severe pain maximum 6 tablet(s) per day 20 tablet 0     aspirin 81 MG tablet Take by mouth daily       budesonide (RINOCORT AQUA) 32 MCG/ACT nasal spray Spray 2 sprays into both nostrils daily 1 Bottle 11     fish oil-omega-3 fatty acids (FISH OIL) 1000 MG capsule Take 2 g by mouth daily        meloxicam (MOBIC) 7.5 MG tablet Take 1 tablet (7.5 mg) by mouth daily 90 tablet 3     simvastatin (ZOCOR) 40 MG tablet Take 1 tablet (40 mg) by mouth At Bedtime 90 tablet 3     Allergies   Allergen Reactions     Sulfasuccinamide Sodium Hives     Sulfa Drugs Rash       ROS:  Constitutional, HEENT, cardiovascular, pulmonary, gi and gu systems are negative, except as otherwise noted.    OBJECTIVE:     /74 (BP Location: Left arm, Patient Position: Sitting, Cuff Size: Adult Regular)   Pulse 67   Temp 98.2  F (36.8  C) (Tympanic)   Resp 16   Ht 1.715 m (5' 7.5\")   Wt 74.8 kg (164 lb 12.8 oz)   SpO2 96%   BMI 25.43 kg/m    Body mass index is 25.43 kg/m .  GENERAL: healthy, alert and no distress  PSYCH: mentation appears normal, affect normal/bright    Diagnostic Test Results:  Results for orders placed or performed in visit on 01/23/19   ALT   Result Value Ref Range    ALT 44 0 - 70 U/L   Lipid Profile   Result Value Ref Range    Cholesterol 187 <200 mg/dL    Triglycerides 150 (H) <150 mg/dL    HDL Cholesterol 46 >39 mg/dL    LDL Cholesterol Calculated 111 (H) <100 mg/dL    Non HDL Cholesterol 141 (H) <130 mg/dL   Basic metabolic panel   Result Value Ref Range    Sodium 140 133 - 144 mmol/L    Potassium 4.1 3.4 - 5.3 mmol/L    Chloride 106 94 - 109 mmol/L    Carbon Dioxide 27 20 - 32 mmol/L    Anion Gap 7 3 - 14 mmol/L    Glucose 93 70 - 99 mg/dL    Urea Nitrogen 22 7 - 30 mg/dL "    Creatinine 1.12 0.66 - 1.25 mg/dL    GFR Estimate 64 >60 mL/min/[1.73_m2]    GFR Estimate If Black 74 >60 mL/min/[1.73_m2]    Calcium 8.8 8.5 - 10.1 mg/dL       ASSESSMENT/PLAN:     Hyperlipidemia; controlled   Plan:  No changes in the patient's current treatment plan    Hypertension; controlled   Associated with the following complications:    None   Plan:  No changes in the patient's current treatment plan      Patient declines colon cancer screening.      ICD-10-CM    1. Hypercholesterolemia E78.00 simvastatin (ZOCOR) 40 MG tablet   2. Benign essential hypertension I10    3. Chronic rhinitis J31.0 budesonide (RINOCORT AQUA) 32 MCG/ACT nasal spray   4. Right foot pain M79.671 meloxicam (MOBIC) 7.5 MG tablet       FUTURE APPOINTMENTS:       - Follow-up visit in 6 months, sooner as needed.      Hodan Tapia MD  [facility]

## 2019-01-23 DIAGNOSIS — E78.00 HYPERCHOLESTEROLEMIA: ICD-10-CM

## 2019-01-23 DIAGNOSIS — I10 BENIGN ESSENTIAL HYPERTENSION: ICD-10-CM

## 2019-01-23 LAB
ALT SERPL W P-5'-P-CCNC: 44 U/L (ref 0–70)
ANION GAP SERPL CALCULATED.3IONS-SCNC: 7 MMOL/L (ref 3–14)
BUN SERPL-MCNC: 22 MG/DL (ref 7–30)
CALCIUM SERPL-MCNC: 8.8 MG/DL (ref 8.5–10.1)
CHLORIDE SERPL-SCNC: 106 MMOL/L (ref 94–109)
CHOLEST SERPL-MCNC: 187 MG/DL
CO2 SERPL-SCNC: 27 MMOL/L (ref 20–32)
CREAT SERPL-MCNC: 1.12 MG/DL (ref 0.66–1.25)
GFR SERPL CREATININE-BSD FRML MDRD: 64 ML/MIN/{1.73_M2}
GLUCOSE SERPL-MCNC: 93 MG/DL (ref 70–99)
HDLC SERPL-MCNC: 46 MG/DL
LDLC SERPL CALC-MCNC: 111 MG/DL
NONHDLC SERPL-MCNC: 141 MG/DL
POTASSIUM SERPL-SCNC: 4.1 MMOL/L (ref 3.4–5.3)
SODIUM SERPL-SCNC: 140 MMOL/L (ref 133–144)
TRIGL SERPL-MCNC: 150 MG/DL

## 2019-01-23 PROCEDURE — 84460 ALANINE AMINO (ALT) (SGPT): CPT | Mod: ZL | Performed by: FAMILY MEDICINE

## 2019-01-23 PROCEDURE — 80061 LIPID PANEL: CPT | Mod: ZL | Performed by: FAMILY MEDICINE

## 2019-01-23 PROCEDURE — 36415 COLL VENOUS BLD VENIPUNCTURE: CPT | Mod: ZL | Performed by: FAMILY MEDICINE

## 2019-01-23 PROCEDURE — 80048 BASIC METABOLIC PNL TOTAL CA: CPT | Mod: ZL | Performed by: FAMILY MEDICINE

## 2019-01-25 ENCOUNTER — OFFICE VISIT (OUTPATIENT)
Dept: FAMILY MEDICINE | Facility: OTHER | Age: 74
End: 2019-01-25
Attending: FAMILY MEDICINE
Payer: COMMERCIAL

## 2019-01-25 VITALS
HEIGHT: 68 IN | SYSTOLIC BLOOD PRESSURE: 132 MMHG | BODY MASS INDEX: 24.98 KG/M2 | TEMPERATURE: 98.2 F | WEIGHT: 164.8 LBS | OXYGEN SATURATION: 96 % | RESPIRATION RATE: 16 BRPM | DIASTOLIC BLOOD PRESSURE: 74 MMHG | HEART RATE: 67 BPM

## 2019-01-25 DIAGNOSIS — J31.0 CHRONIC RHINITIS: ICD-10-CM

## 2019-01-25 DIAGNOSIS — I10 BENIGN ESSENTIAL HYPERTENSION: ICD-10-CM

## 2019-01-25 DIAGNOSIS — M79.671 RIGHT FOOT PAIN: ICD-10-CM

## 2019-01-25 DIAGNOSIS — E78.00 HYPERCHOLESTEROLEMIA: Primary | ICD-10-CM

## 2019-01-25 PROCEDURE — G0463 HOSPITAL OUTPT CLINIC VISIT: HCPCS

## 2019-01-25 PROCEDURE — 99214 OFFICE O/P EST MOD 30 MIN: CPT | Performed by: FAMILY MEDICINE

## 2019-01-25 RX ORDER — SIMVASTATIN 40 MG
40 TABLET ORAL AT BEDTIME
Qty: 90 TABLET | Refills: 3 | Status: SHIPPED | OUTPATIENT
Start: 2019-01-25 | End: 2020-02-11

## 2019-01-25 RX ORDER — MELOXICAM 7.5 MG/1
7.5 TABLET ORAL DAILY
Qty: 90 TABLET | Refills: 3 | Status: SHIPPED | OUTPATIENT
Start: 2019-01-25 | End: 2020-03-30

## 2019-01-25 ASSESSMENT — MIFFLIN-ST. JEOR: SCORE: 1454.09

## 2019-01-25 ASSESSMENT — PAIN SCALES - GENERAL: PAINLEVEL: NO PAIN (0)

## 2019-01-25 NOTE — NURSING NOTE
"Chief Complaint   Patient presents with     Hypertension     Lipids       Initial /74 (BP Location: Left arm, Patient Position: Sitting, Cuff Size: Adult Regular)   Pulse 67   Temp 98.2  F (36.8  C) (Tympanic)   Resp 16   Ht 1.715 m (5' 7.5\")   Wt 74.8 kg (164 lb 12.8 oz)   SpO2 96%   BMI 25.43 kg/m   Estimated body mass index is 25.43 kg/m  as calculated from the following:    Height as of this encounter: 1.715 m (5' 7.5\").    Weight as of this encounter: 74.8 kg (164 lb 12.8 oz).  Medication Reconciliation: complete    Arianna Muller MA  "

## 2019-01-29 NOTE — PROGRESS NOTES
SUBJECTIVE:   Ten Flores is a 73 year old male who presents to clinic today for the following health issues:      Musculoskeletal problem/pain      Duration: 5-6 months    Description  Location: Left shoulder    Intensity:  4/10    Accompanying signs and symptoms: radiation of pain to bicep    History  Previous similar problem: YES  Previous evaluation:  none    Precipitating or alleviating factors:  Trauma or overuse: YES- fell several years ago  Aggravating factors include: lifting    Therapies tried and outcome: rest/inactivity, heat, ice, acetaminophen and Ibuprofen    Patient has a friend, a patient of mine, who has received steroid injections and recommended this to the patient.  Patient notes he has had evaluation previously, and surgery was not recommended.        Problem list and histories reviewed & adjusted, as indicated.  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         Social History     Tobacco Use     Smoking status: Former Smoker     Packs/day: 0.50     Years: 5.00     Pack years: 2.50     Types: Cigarettes     Last attempt to quit: 1965     Years since quittin.1     Smokeless tobacco: Former User     Tobacco comment: Tried to Quit (YES)   Substance Use Topics     Alcohol use: Yes     Alcohol/week: 0.0 oz     Comment: DAILY,wine,beer     Family History   Problem Relation Age of Onset     Heart Disease Mother 68        Disease - Cause of Death     Heart Disease Father 89        Disease - Cause of Death         Current Outpatient Medications   Medication Sig Dispense Refill      "acetaminophen (TYLENOL) 500 MG tablet Take 2 tablets (1,000 mg) by mouth every 8 hours as needed 100 tablet 1     acetaminophen-codeine (TYLENOL #3) 300-30 MG per tablet Take 1 tablet by mouth every 6 hours as needed for severe pain maximum 6 tablet(s) per day 20 tablet 0     aspirin 81 MG tablet Take by mouth daily       budesonide (RINOCORT AQUA) 32 MCG/ACT nasal spray Spray 2 sprays into both nostrils daily 1 Bottle 11     fish oil-omega-3 fatty acids (FISH OIL) 1000 MG capsule Take 2 g by mouth daily        meloxicam (MOBIC) 7.5 MG tablet Take 1 tablet (7.5 mg) by mouth daily 90 tablet 3     simvastatin (ZOCOR) 40 MG tablet Take 1 tablet (40 mg) by mouth At Bedtime 90 tablet 3     Allergies   Allergen Reactions     Sulfasuccinamide Sodium Hives     Sulfa Drugs Rash       Reviewed and updated as needed this visit by clinical staff  Tobacco  Allergies  Meds  Problems  Med Hx  Surg Hx  Fam Hx  Soc Hx        Reviewed and updated as needed this visit by Provider  Tobacco  Allergies  Meds  Problems  Med Hx  Surg Hx  Fam Hx         ROS:  Constitutional, HEENT, cardiovascular, pulmonary, gi and gu systems are negative, except as otherwise noted.    OBJECTIVE:     /70 (BP Location: Left arm, Patient Position: Sitting, Cuff Size: Adult Regular)   Pulse 77   Temp 98.5  F (36.9  C) (Tympanic)   Resp 16   Ht 1.715 m (5' 7.5\")   Wt 76 kg (167 lb 9.6 oz)   SpO2 96%   BMI 25.86 kg/m    Body mass index is 25.86 kg/m .  GENERAL: healthy, alert and no distress  PSYCH: mentation appears normal, affect normal/bright    Procedure: steroid injection of left shoulder joint using posterior approach  Indication: pain  Consent: verbal and written consent was obtained from patient after risks and benefits are discussed  Description:  Time out procedure is completed.  Joint space is identified and marked.  Betadine prep is completed.  Ethyl chloride spray is used for topical anesthesia.  1 cc Kenalog 40mg/cc and 4 " cc 1% Lidocaine without epinephrine are injected without difficulty.  Patient tolerated procedure well.      ASSESSMENT/PLAN:     1. Chronic left shoulder pain  Patient tolerated injection well.  Monitor for signs of infection.  Follow-up as needed.  - Large Joint/Bursa injection and/or drainage (Shoulder, Knee)  - triamcinolone (KENALOG-40) injection 40 mg; 1 mL (40 mg) by INTRA-ARTICULAR route once  - lidocaine 1 % injection 4 mL; 4 mLs by INTRA-ARTICULAR route once        Hodan Tapia MD  [facility]

## 2019-02-01 ENCOUNTER — OFFICE VISIT (OUTPATIENT)
Dept: FAMILY MEDICINE | Facility: OTHER | Age: 74
End: 2019-02-01
Attending: FAMILY MEDICINE
Payer: COMMERCIAL

## 2019-02-01 VITALS
RESPIRATION RATE: 16 BRPM | HEIGHT: 68 IN | SYSTOLIC BLOOD PRESSURE: 136 MMHG | OXYGEN SATURATION: 96 % | WEIGHT: 167.6 LBS | BODY MASS INDEX: 25.4 KG/M2 | HEART RATE: 77 BPM | TEMPERATURE: 98.5 F | DIASTOLIC BLOOD PRESSURE: 70 MMHG

## 2019-02-01 DIAGNOSIS — M25.512 CHRONIC LEFT SHOULDER PAIN: Primary | ICD-10-CM

## 2019-02-01 DIAGNOSIS — G89.29 CHRONIC LEFT SHOULDER PAIN: Primary | ICD-10-CM

## 2019-02-01 PROCEDURE — 20610 DRAIN/INJ JOINT/BURSA W/O US: CPT | Mod: LT | Performed by: FAMILY MEDICINE

## 2019-02-01 PROCEDURE — G0463 HOSPITAL OUTPT CLINIC VISIT: HCPCS | Mod: 25

## 2019-02-01 PROCEDURE — G0463 HOSPITAL OUTPT CLINIC VISIT: HCPCS

## 2019-02-01 RX ORDER — LIDOCAINE HYDROCHLORIDE 10 MG/ML
4 INJECTION, SOLUTION INFILTRATION; PERINEURAL ONCE
Status: COMPLETED | OUTPATIENT
Start: 2019-02-01 | End: 2019-02-01

## 2019-02-01 RX ORDER — TRIAMCINOLONE ACETONIDE 40 MG/ML
40 INJECTION, SUSPENSION INTRA-ARTICULAR; INTRAMUSCULAR ONCE
Status: COMPLETED | OUTPATIENT
Start: 2019-02-01 | End: 2019-02-01

## 2019-02-01 RX ORDER — TRIAMCINOLONE ACETONIDE 40 MG/ML
40 INJECTION, SUSPENSION INTRA-ARTICULAR; INTRAMUSCULAR ONCE
Status: CANCELLED | OUTPATIENT
Start: 2019-02-01 | End: 2019-02-01

## 2019-02-01 RX ADMIN — TRIAMCINOLONE ACETONIDE 40 MG: 40 INJECTION, SUSPENSION INTRA-ARTICULAR; INTRAMUSCULAR at 13:22

## 2019-02-01 RX ADMIN — LIDOCAINE HYDROCHLORIDE 4 ML: 10 INJECTION, SOLUTION EPIDURAL; INFILTRATION; INTRACAUDAL at 13:22

## 2019-02-01 ASSESSMENT — PATIENT HEALTH QUESTIONNAIRE - PHQ9
SUM OF ALL RESPONSES TO PHQ QUESTIONS 1-9: 0
5. POOR APPETITE OR OVEREATING: NOT AT ALL

## 2019-02-01 ASSESSMENT — ANXIETY QUESTIONNAIRES
2. NOT BEING ABLE TO STOP OR CONTROL WORRYING: NOT AT ALL
1. FEELING NERVOUS, ANXIOUS, OR ON EDGE: NOT AT ALL
7. FEELING AFRAID AS IF SOMETHING AWFUL MIGHT HAPPEN: NOT AT ALL
GAD7 TOTAL SCORE: 0
6. BECOMING EASILY ANNOYED OR IRRITABLE: NOT AT ALL
IF YOU CHECKED OFF ANY PROBLEMS ON THIS QUESTIONNAIRE, HOW DIFFICULT HAVE THESE PROBLEMS MADE IT FOR YOU TO DO YOUR WORK, TAKE CARE OF THINGS AT HOME, OR GET ALONG WITH OTHER PEOPLE: NOT DIFFICULT AT ALL
5. BEING SO RESTLESS THAT IT IS HARD TO SIT STILL: NOT AT ALL
3. WORRYING TOO MUCH ABOUT DIFFERENT THINGS: NOT AT ALL

## 2019-02-01 ASSESSMENT — MIFFLIN-ST. JEOR: SCORE: 1466.79

## 2019-02-01 ASSESSMENT — PAIN SCALES - GENERAL: PAINLEVEL: MODERATE PAIN (4)

## 2019-02-01 NOTE — NURSING NOTE
"Chief Complaint   Patient presents with     Musculoskeletal Problem       Initial /70 (BP Location: Left arm, Patient Position: Sitting, Cuff Size: Adult Regular)   Pulse 77   Temp 98.5  F (36.9  C) (Tympanic)   Resp 16   Ht 1.715 m (5' 7.5\")   Wt 76 kg (167 lb 9.6 oz)   SpO2 96%   BMI 25.86 kg/m   Estimated body mass index is 25.86 kg/m  as calculated from the following:    Height as of this encounter: 1.715 m (5' 7.5\").    Weight as of this encounter: 76 kg (167 lb 9.6 oz).  Medication Reconciliation: complete    Arianna Muller MA    "

## 2019-02-02 ASSESSMENT — ANXIETY QUESTIONNAIRES: GAD7 TOTAL SCORE: 0

## 2019-02-15 DIAGNOSIS — J31.0 CHRONIC RHINITIS: Primary | ICD-10-CM

## 2019-02-15 RX ORDER — FLUTICASONE PROPIONATE 50 MCG
1 SPRAY, SUSPENSION (ML) NASAL DAILY
Qty: 16 G | Refills: 11 | Status: SHIPPED | OUTPATIENT
Start: 2019-02-15 | End: 2020-04-30

## 2019-02-15 NOTE — TELEPHONE ENCOUNTER
Flonase this is the only thing covered by insurance per  pharmacy       Last Written Prescription Date:  Med not on list   Last Fill Quantity: 0,   # refills: 0  Last Office Visit: 2/1/19  Future Office visit:       Routing refill request to provider for review/approval because:  Drug not active on patient's medication list

## 2019-02-15 NOTE — TELEPHONE ENCOUNTER
cyndinase      Last Written Prescription Date:  3/4/16  Last Fill Quantity: 16 g,   # refills: 11  Last Office Visit: 2/10/19  Future Office visit:       Routing refill request to provider for review/approval because:  Med discontinued by Dr. Hernandez on 2/23/17  Reason for Discontinue: Alternate therapy    pcp to advise. Thank you

## 2019-04-29 ENCOUNTER — OFFICE VISIT (OUTPATIENT)
Dept: FAMILY MEDICINE | Facility: OTHER | Age: 74
End: 2019-04-29
Attending: FAMILY MEDICINE
Payer: COMMERCIAL

## 2019-04-29 VITALS
BODY MASS INDEX: 25.43 KG/M2 | HEIGHT: 68 IN | OXYGEN SATURATION: 96 % | DIASTOLIC BLOOD PRESSURE: 84 MMHG | RESPIRATION RATE: 16 BRPM | SYSTOLIC BLOOD PRESSURE: 168 MMHG | TEMPERATURE: 97.8 F | WEIGHT: 167.8 LBS | HEART RATE: 75 BPM

## 2019-04-29 DIAGNOSIS — J01.90 ACUTE SINUSITIS WITH SYMPTOMS > 10 DAYS: Primary | ICD-10-CM

## 2019-04-29 PROCEDURE — 99213 OFFICE O/P EST LOW 20 MIN: CPT | Performed by: FAMILY MEDICINE

## 2019-04-29 PROCEDURE — G0463 HOSPITAL OUTPT CLINIC VISIT: HCPCS

## 2019-04-29 RX ORDER — AMOXICILLIN 875 MG
875 TABLET ORAL 2 TIMES DAILY
Qty: 28 TABLET | Refills: 0 | Status: SHIPPED | OUTPATIENT
Start: 2019-04-29 | End: 2019-07-31

## 2019-04-29 ASSESSMENT — PAIN SCALES - GENERAL: PAINLEVEL: NO PAIN (1)

## 2019-04-29 ASSESSMENT — MIFFLIN-ST. JEOR: SCORE: 1462.7

## 2019-04-29 NOTE — PROGRESS NOTES
SUBJECTIVE:   Ten Flores is a 74 year old male who presents to clinic today for the following   health issues:        Acute Illness   Acute illness concerns: sinus drainage and pressure  Onset: 1 month    Fever: no     Chills/Sweats: no     Headache (location?): YES- frontal    Sinus Pressure:YES    Conjunctivitis:  no    Ear Pain: YES: bilateral    Rhinorrhea: YES    Congestion: no     Sore Throat: YES     Cough: no    Wheeze: no     Decreased Appetite: YES    Nausea: no     Vomiting: no     Diarrhea:  Yes a couple days ago    Dysuria/Freq.: no     Fatigue/Achiness: YES    Sick/Strep Exposure: no      Therapies Tried and outcome: Tylenol and sinus spray         Additional history: as documented    Reviewed  and updated as needed this visit by clinical staff  Tobacco  Allergies  Meds  Problems  Med Hx  Surg Hx  Fam Hx         Reviewed and updated as needed this visit by Provider         Patient Active Problem List   Diagnosis     Advanced care planning/counseling discussion     Benign essential hypertension     Hypercholesterolemia     Hip pain, right     Chronic rhinitis     h/o Chewing tobacco nicotine dependence     Past Surgical History:   Procedure Laterality Date     DERMATOLOGY IP CONSULT       GENITOURINARY SURGERY      circ     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         Social History     Tobacco Use     Smoking status: Former Smoker     Packs/day: 0.50     Years: 5.00     Pack years: 2.50     Types: Cigarettes     Last attempt to quit: 1965     Years since quittin.3     Smokeless tobacco: Former User     Tobacco comment: Tried to Quit (YES)   Substance Use Topics     Alcohol use: Yes     Alcohol/week: 0.0 oz     Comment: DAILY,wine,beer     Family History   Problem Relation Age of Onset     Heart Disease Mother 68        Disease - Cause of Death     Heart Disease Father 89        Disease - Cause of Death         Current Outpatient Medications   Medication  "Sig Dispense Refill     acetaminophen (TYLENOL) 500 MG tablet Take 2 tablets (1,000 mg) by mouth every 8 hours as needed 100 tablet 1     acetaminophen-codeine (TYLENOL #3) 300-30 MG per tablet Take 1 tablet by mouth every 6 hours as needed for severe pain maximum 6 tablet(s) per day 20 tablet 0     amoxicillin (AMOXIL) 875 MG tablet Take 1 tablet (875 mg) by mouth 2 times daily for 14 days 28 tablet 0     aspirin 81 MG tablet Take by mouth daily       budesonide (RINOCORT AQUA) 32 MCG/ACT nasal spray Spray 2 sprays into both nostrils daily 1 Bottle 11     fish oil-omega-3 fatty acids (FISH OIL) 1000 MG capsule Take 2 g by mouth daily        fluticasone (FLONASE) 50 MCG/ACT nasal spray Spray 1 spray into both nostrils daily 16 g 11     meloxicam (MOBIC) 7.5 MG tablet Take 1 tablet (7.5 mg) by mouth daily 90 tablet 3     simvastatin (ZOCOR) 40 MG tablet Take 1 tablet (40 mg) by mouth At Bedtime 90 tablet 3     Allergies   Allergen Reactions     Sulfasuccinamide Sodium Hives     Sulfa Drugs Rash       ROS:  Constitutional, HEENT, cardiovascular, pulmonary, gi and gu systems are negative, except as otherwise noted.    OBJECTIVE:     /84 (BP Location: Right arm, Patient Position: Chair, Cuff Size: Adult Regular)   Pulse 75   Temp 97.8  F (36.6  C) (Tympanic)   Resp 16   Ht 1.715 m (5' 7.5\")   Wt 76.1 kg (167 lb 12.8 oz)   SpO2 96%   BMI 25.89 kg/m    Body mass index is 25.89 kg/m .  GENERAL: alert and no distress  EYES: Eyes grossly normal to inspection, PERRL and conjunctivae and sclerae normal  HENT: ear canals and TM's normal, nasal congestion noted, mouth without ulcers or lesions  NECK: no adenopathy  RESP: lungs clear to auscultation - no rales, rhonchi or wheezes  CV: regular rates and rhythm, normal S1 S2, no S3 or S4 and no murmur, click or rub  PSYCH: mentation appears normal, affect normal/bright    Diagnostic Test Results:  none     ASSESSMENT/PLAN:     1. Acute sinusitis with symptoms > 10 " days  Amoxicillin sent.  Symptomatic cares recommended, continue nasal spray.  Follow-up if symptoms worsening or not improving.  - amoxicillin (AMOXIL) 875 MG tablet; Take 1 tablet (875 mg) by mouth 2 times daily for 14 days  Dispense: 28 tablet; Refill: 0      Hodan Tapia MD  [facility]

## 2019-04-29 NOTE — NURSING NOTE
"Chief Complaint   Patient presents with     Sinus Problem       Initial /84 (BP Location: Right arm, Patient Position: Chair, Cuff Size: Adult Regular)   Pulse 75   Temp 97.8  F (36.6  C) (Tympanic)   Resp 16   Ht 1.715 m (5' 7.5\")   Wt 76.1 kg (167 lb 12.8 oz)   SpO2 96%   BMI 25.89 kg/m   Estimated body mass index is 25.89 kg/m  as calculated from the following:    Height as of this encounter: 1.715 m (5' 7.5\").    Weight as of this encounter: 76.1 kg (167 lb 12.8 oz).  Medication Reconciliation: complete    Pamela M. Lechevalier, LPN    "

## 2019-07-02 DIAGNOSIS — H91.93 DECREASED HEARING OF BOTH EARS: Primary | ICD-10-CM

## 2019-07-10 ENCOUNTER — OFFICE VISIT (OUTPATIENT)
Dept: AUDIOLOGY | Facility: OTHER | Age: 74
End: 2019-07-10
Attending: AUDIOLOGIST
Payer: COMMERCIAL

## 2019-07-10 DIAGNOSIS — H90.3 SENSORINEURAL HEARING LOSS (SNHL) OF BOTH EARS: Primary | ICD-10-CM

## 2019-07-10 PROCEDURE — 92556 SPEECH AUDIOMETRY COMPLETE: CPT | Performed by: AUDIOLOGIST

## 2019-07-10 PROCEDURE — 92552 PURE TONE AUDIOMETRY AIR: CPT | Performed by: AUDIOLOGIST

## 2019-07-10 NOTE — PROGRESS NOTES
Audiology Evaluation Completed. Please refer SCANNED AUDIOGRAM and/or TYMPANOGRAM for BACKGROUND, RESULTS, RECOMMENDATIONS.      Sadie FAIRBANKS, [facility]  Audiologist #[id number]

## 2019-07-24 NOTE — PROGRESS NOTES
SUBJECTIVE:   Ten Flores is a 74 year old male who presents to clinic today for the following   health issues:      Hyperlipidemia Follow-Up      Are you having any of the following symptoms? (Select all that apply)  No complaints of shortness of breath, chest pain or pressure.  No increased sweating or nausea with activity.  No left-sided neck or arm pain.  No complaints of pain in calves when walking 1-2 blocks.    Are you regularly taking any medication or supplement to lower your cholesterol?   Yes- Zocor    Are you having muscle aches or other side effects that you think could be caused by your cholesterol lowering medication?  No      Hypertension Follow-up      Do you check your blood pressure regularly outside of the clinic? Yes     Are you following a low salt diet? Yes    Are your blood pressures ever more than 140 on the top number (systolic) OR more   than 90 on the bottom number (diastolic), for example 140/90? Yes         Amount of exercise or physical activity: 4-5 days/week for an average of 30-45 minutes    Problems taking medications regularly: No    Medication side effects: none    Diet: low salt      Patient declines any colon cancer screening.  He understands the risks of not screening.      Additional history: as documented    Reviewed  and updated as needed this visit by clinical staff  Tobacco  Allergies  Meds  Med Hx  Surg Hx  Fam Hx  Soc Hx        Reviewed and updated as needed this visit by Provider         Patient Active Problem List   Diagnosis     Advanced care planning/counseling discussion     Benign essential hypertension     Hypercholesterolemia     Hip pain, right     Chronic rhinitis     h/o Chewing tobacco nicotine dependence     Past Surgical History:   Procedure Laterality Date     DERMATOLOGY IP CONSULT       GENITOURINARY SURGERY      circ     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         Social History     Tobacco Use     Smoking status:  "Former Smoker     Packs/day: 0.50     Years: 5.00     Pack years: 2.50     Types: Cigarettes     Last attempt to quit: 1965     Years since quittin.6     Smokeless tobacco: Former User     Tobacco comment: Tried to Quit (YES)   Substance Use Topics     Alcohol use: Yes     Alcohol/week: 0.0 oz     Comment: DAILY,wine,beer     Family History   Problem Relation Age of Onset     Heart Disease Mother 68        Disease - Cause of Death     Heart Disease Father 89        Disease - Cause of Death         Current Outpatient Medications   Medication Sig Dispense Refill     acetaminophen (TYLENOL) 500 MG tablet Take 2 tablets (1,000 mg) by mouth every 8 hours as needed 100 tablet 1     acetaminophen-codeine (TYLENOL #3) 300-30 MG per tablet Take 1 tablet by mouth every 6 hours as needed for severe pain maximum 6 tablet(s) per day 20 tablet 0     aspirin 81 MG tablet Take by mouth daily       budesonide (RINOCORT AQUA) 32 MCG/ACT nasal spray Spray 2 sprays into both nostrils daily 1 Bottle 11     fish oil-omega-3 fatty acids (FISH OIL) 1000 MG capsule Take 2 g by mouth daily        fluticasone (FLONASE) 50 MCG/ACT nasal spray Spray 1 spray into both nostrils daily 16 g 11     meloxicam (MOBIC) 7.5 MG tablet Take 1 tablet (7.5 mg) by mouth daily 90 tablet 3     simvastatin (ZOCOR) 40 MG tablet Take 1 tablet (40 mg) by mouth At Bedtime 90 tablet 3     Allergies   Allergen Reactions     Sulfasuccinamide Sodium Hives     Sulfa Drugs Rash       ROS:  Constitutional, HEENT, cardiovascular, pulmonary, gi and gu systems are negative, except as otherwise noted.    OBJECTIVE:                                                    /70 (BP Location: Right arm, Patient Position: Sitting, Cuff Size: Adult Regular)   Pulse 67   Temp 98  F (36.7  C) (Tympanic)   Resp 16   Ht 1.715 m (5' 7.5\")   Wt 76.5 kg (168 lb 11.2 oz)   SpO2 97%   BMI 26.03 kg/m    Body mass index is 26.03 kg/m .  GENERAL APPEARANCE: healthy, alert and " no distress  RESP: lungs clear to auscultation - no rales, rhonchi or wheezes  CV: regular rates and rhythm, normal S1 S2, no S3 or S4 and no murmur, click or rub  PSYCH: mentation appears normal and affect normal/bright    Diagnostic test results:  Diagnostic Test Results:  Labs reviewed in Epic  Results for orders placed or performed in visit on 07/26/19   Comprehensive metabolic panel (BMP + Alb, Alk Phos, ALT, AST, Total. Bili, TP)   Result Value Ref Range    Sodium 138 133 - 144 mmol/L    Potassium 4.2 3.4 - 5.3 mmol/L    Chloride 106 94 - 109 mmol/L    Carbon Dioxide 26 20 - 32 mmol/L    Anion Gap 6 3 - 14 mmol/L    Glucose 93 70 - 99 mg/dL    Urea Nitrogen 24 7 - 30 mg/dL    Creatinine 1.09 0.66 - 1.25 mg/dL    GFR Estimate 66 >60 mL/min/[1.73_m2]    GFR Estimate If Black 76 >60 mL/min/[1.73_m2]    Calcium 9.4 8.5 - 10.1 mg/dL    Bilirubin Total 0.5 0.2 - 1.3 mg/dL    Albumin 3.8 3.4 - 5.0 g/dL    Protein Total 8.0 6.8 - 8.8 g/dL    Alkaline Phosphatase 76 40 - 150 U/L    ALT 42 0 - 70 U/L    AST 33 0 - 45 U/L   Lipid Profile   Result Value Ref Range    Cholesterol 156 <200 mg/dL    Triglycerides 101 <150 mg/dL    HDL Cholesterol 48 >39 mg/dL    LDL Cholesterol Calculated 88 <100 mg/dL    Non HDL Cholesterol 108 <130 mg/dL   TSH with free T4 reflex   Result Value Ref Range    TSH 2.51 0.40 - 4.00 mU/L   PSA, screen   Result Value Ref Range    PSA 1.52 0 - 4 ug/L        ASSESSMENT/PLAN:                                                    1. Hypercholesterolemia  Labs reviewed, no changes recommended.  Patient wishes to stay on current medications.    2. Benign essential hypertension  No changes recommended.      Follow up with Provider - 6 months, sooner as needed     Hodan Tapia MD  [facility]

## 2019-07-26 DIAGNOSIS — E78.00 HYPERCHOLESTEROLEMIA: ICD-10-CM

## 2019-07-26 DIAGNOSIS — Z12.5 SCREENING FOR PROSTATE CANCER: ICD-10-CM

## 2019-07-26 DIAGNOSIS — I10 BENIGN ESSENTIAL HYPERTENSION: Primary | ICD-10-CM

## 2019-07-26 LAB
ALBUMIN SERPL-MCNC: 3.8 G/DL (ref 3.4–5)
ALP SERPL-CCNC: 76 U/L (ref 40–150)
ALT SERPL W P-5'-P-CCNC: 42 U/L (ref 0–70)
ANION GAP SERPL CALCULATED.3IONS-SCNC: 6 MMOL/L (ref 3–14)
AST SERPL W P-5'-P-CCNC: 33 U/L (ref 0–45)
BILIRUB SERPL-MCNC: 0.5 MG/DL (ref 0.2–1.3)
BUN SERPL-MCNC: 24 MG/DL (ref 7–30)
CALCIUM SERPL-MCNC: 9.4 MG/DL (ref 8.5–10.1)
CHLORIDE SERPL-SCNC: 106 MMOL/L (ref 94–109)
CHOLEST SERPL-MCNC: 156 MG/DL
CO2 SERPL-SCNC: 26 MMOL/L (ref 20–32)
CREAT SERPL-MCNC: 1.09 MG/DL (ref 0.66–1.25)
GFR SERPL CREATININE-BSD FRML MDRD: 66 ML/MIN/{1.73_M2}
GLUCOSE SERPL-MCNC: 93 MG/DL (ref 70–99)
HDLC SERPL-MCNC: 48 MG/DL
LDLC SERPL CALC-MCNC: 88 MG/DL
NONHDLC SERPL-MCNC: 108 MG/DL
POTASSIUM SERPL-SCNC: 4.2 MMOL/L (ref 3.4–5.3)
PROT SERPL-MCNC: 8 G/DL (ref 6.8–8.8)
PSA SERPL-ACNC: 1.52 UG/L (ref 0–4)
SODIUM SERPL-SCNC: 138 MMOL/L (ref 133–144)
TRIGL SERPL-MCNC: 101 MG/DL
TSH SERPL DL<=0.005 MIU/L-ACNC: 2.51 MU/L (ref 0.4–4)

## 2019-07-26 PROCEDURE — 80061 LIPID PANEL: CPT | Mod: ZL | Performed by: NURSE PRACTITIONER

## 2019-07-26 PROCEDURE — G0103 PSA SCREENING: HCPCS | Mod: ZL | Performed by: FAMILY MEDICINE

## 2019-07-26 PROCEDURE — 84443 ASSAY THYROID STIM HORMONE: CPT | Mod: ZL | Performed by: NURSE PRACTITIONER

## 2019-07-26 PROCEDURE — 36415 COLL VENOUS BLD VENIPUNCTURE: CPT | Mod: ZL | Performed by: NURSE PRACTITIONER

## 2019-07-26 PROCEDURE — 80053 COMPREHEN METABOLIC PANEL: CPT | Mod: ZL | Performed by: NURSE PRACTITIONER

## 2019-07-31 ENCOUNTER — OFFICE VISIT (OUTPATIENT)
Dept: FAMILY MEDICINE | Facility: OTHER | Age: 74
End: 2019-07-31
Attending: FAMILY MEDICINE
Payer: COMMERCIAL

## 2019-07-31 VITALS
SYSTOLIC BLOOD PRESSURE: 134 MMHG | HEART RATE: 67 BPM | TEMPERATURE: 98 F | HEIGHT: 68 IN | OXYGEN SATURATION: 97 % | DIASTOLIC BLOOD PRESSURE: 70 MMHG | WEIGHT: 168.7 LBS | BODY MASS INDEX: 25.57 KG/M2 | RESPIRATION RATE: 16 BRPM

## 2019-07-31 DIAGNOSIS — Z12.11 COLON CANCER SCREENING: ICD-10-CM

## 2019-07-31 DIAGNOSIS — E78.00 HYPERCHOLESTEROLEMIA: Primary | ICD-10-CM

## 2019-07-31 DIAGNOSIS — I10 BENIGN ESSENTIAL HYPERTENSION: ICD-10-CM

## 2019-07-31 PROCEDURE — G0463 HOSPITAL OUTPT CLINIC VISIT: HCPCS

## 2019-07-31 PROCEDURE — 99213 OFFICE O/P EST LOW 20 MIN: CPT | Performed by: FAMILY MEDICINE

## 2019-07-31 ASSESSMENT — MIFFLIN-ST. JEOR: SCORE: 1466.78

## 2019-07-31 ASSESSMENT — PAIN SCALES - GENERAL: PAINLEVEL: NO PAIN (0)

## 2019-07-31 NOTE — NURSING NOTE
"Chief Complaint   Patient presents with     Lipids     Hypertension       Initial /70 (BP Location: Right arm, Patient Position: Sitting, Cuff Size: Adult Regular)   Pulse 67   Temp 98  F (36.7  C) (Tympanic)   Resp 16   Ht 1.715 m (5' 7.5\")   Wt 76.5 kg (168 lb 11.2 oz)   SpO2 97%   BMI 26.03 kg/m   Estimated body mass index is 26.03 kg/m  as calculated from the following:    Height as of this encounter: 1.715 m (5' 7.5\").    Weight as of this encounter: 76.5 kg (168 lb 11.2 oz).  Medication Reconciliation: complete  "

## 2019-10-21 NOTE — MR AVS SNAPSHOT
After Visit Summary   1/22/2018    Ten Flores    MRN: [id number]           Patient Information     Date Of Birth          1945        Visit Information        Provider Department      1/22/2018 9:45 AM Hodan Tapia MD [facility]        Today's Diagnoses     Hypercholesterolemia    -  1    Benign essential hypertension        Right foot pain        Screening for prostate cancer           Follow-ups after your visit        Follow-up notes from your care team     Return in about 6 months (around 7/22/2018).      Your next 10 appointments already scheduled     Jun 22, 2018  1:00 PM CDT   (Arrive by 12:45 PM)   Hearing Eval with Sammi Morales   [facility] [place] ([facility] [place] )    [address]  [place] [address]   [phone]            Jul 23, 2018  9:45 AM CDT   (Arrive by 9:30 AM)   SHORT with Hodan Tapia MD   [facility] ([facility] )    [address]  [facility] [address]   [phone]              Future tests that were ordered for you today     Open Future Orders        Priority Expected Expires Ordered    Basic metabolic panel Routine 7/22/2018 1/22/2019 1/22/2018    Lipid Profile Routine 7/22/2018 1/22/2019 1/22/2018    ALT Routine 7/22/2018 1/22/2019 1/22/2018            Who to contact     If you have questions or need follow up information about today's clinic visit or your schedule please contact [facility] directly at [phone].  Normal or non-critical lab and imaging results will be communicated to you by MyChart, letter or phone within 4 business days after the clinic has received the results. If you do not hear from us within 7 days, please contact the clinic through MyChart or phone. If you have a critical or abnormal lab result, we will notify you by phone as soon as possible.  Submit refill requests through [employer] or call your pharmacy and they  · Managed on Metoprolol and Cardizem  · Anticoagulated with Xarelto  · Continue PTA medications  · Tachycardic in the ED secondary to acute infection "will forward the refill request to us. Please allow 3 business days for your refill to be completed.          Additional Information About Your Visit        [employer] Information     [employer] lets you send messages to your doctor, view your test results, renew your prescriptions, schedule appointments and more. To sign up, go to [email or web address] . Click on \"Log in\" on the left side of the screen, which will take you to the Welcome page. Then click on \"Sign up Now\" on the right side of the page.     You will be asked to enter the access code listed below, as well as some personal information. Please follow the directions to create your username and password.     Your access code is: [email or web address]  Expires: 2018 11:45 AM     Your access code will  in 90 days. If you need help or a new code, please call your [facility] clinic or [phone].        Care EveryWhere ID     This is your Care EveryWhere ID. This could be used by other organizations to access your [facility] medical records  [id number]        Your Vitals Were     Pulse Temperature Respirations Height Pulse Oximetry BMI (Body Mass Index)    72 97.8  F (36.6  C) (Tympanic) 14 5' 7.5\" (1.715 m) 97% 24.84 kg/m2       Blood Pressure from Last 3 Encounters:   18 128/72   17 136/82   17 146/85    Weight from Last 3 Encounters:   18 161 lb (73 kg)   17 154 lb (69.9 kg)   17 154 lb (69.9 kg)              We Performed the Following     PSA, screen          Today's Medication Changes          These changes are accurate as of: 18 11:45 AM.  If you have any questions, ask your nurse or doctor.               Start taking these medicines.        Dose/Directions    meloxicam 7.5 MG tablet   Commonly known as:  MOBIC   Used for:  Right foot pain   Started by:  Hodan Tapia MD        Dose:  7.5 mg   Take 1 tablet (7.5 mg) by mouth daily   Quantity:  90 tablet   Refills:  1         Stop taking these medicines if " you haven't already. Please contact your care team if you have questions.     triamterene-hydrochlorothiazide 37.5-25 MG per capsule   Commonly known as:  DYAZIDE   Stopped by:  Hodan Tapia MD                Where to get your medicines      These medications were sent to [place] [id number] [place] [address] [address]     Phone:  [phone]     meloxicam 7.5 MG tablet                Primary Care Provider Office Phone # Fax #    Hodan Tapia [phone] [address] [facility] [address]        Equal Access to Services     [facility]: Hadii new garcia hadasho Sosekou, waaxda luqadaha, qaybta kaalmada farhan, sivan marion . So [facility] [phone].    ATENCIÓN: Si habla español, tiene a kincaid disposición servicios gratuitos de asistencia lingüística. Ll[facility] [phone].    We comply with applicable federal civil rights laws and [place] laws. We do not discriminate on the basis of race, color, national origin, age, disability, sex, sexual orientation, or gender identity.            Thank you!     Thank you for choosing [facility]  for your care. Our goal is always to provide you with excellent care. Hearing back from our patients is one way we can continue to improve our services. Please take a few minutes to complete the written survey that you may receive in the mail after your visit with us. Thank you!             Your Updated Medication List - Protect others around you: Learn how to safely use, store and throw away your medicines at www.disposemymeds.org.          This list is accurate as of: 1/22/18 11:45 AM.  Always use your most recent med list.                   Brand Name Dispense Instructions for use Diagnosis    acetaminophen 500 MG tablet    TYLENOL    100 tablet    Take 2 tablets (1,000 mg) by mouth every 8 hours as needed    Hip pain, right       aspirin 81 MG  tablet      Take by mouth daily        budesonide 32 MCG/ACT spray    RINOCORT AQUA    1 Bottle    Spray 2 sprays into both nostrils daily    Chronic rhinitis, Nasal turbinate hypertrophy       fish oil-omega-3 fatty acids 1000 MG capsule      Take 2 g by mouth daily        HYDROcodone-acetaminophen 5-325 MG per tablet    NORCO    10 tablet    Take 1 tablet by mouth every 6 hours as needed for moderate to severe pain    Hip pain, right       meloxicam 7.5 MG tablet    MOBIC    90 tablet    Take 1 tablet (7.5 mg) by mouth daily    Right foot pain       simvastatin 40 MG tablet    ZOCOR    90 tablet    TAKE 1 TABLET BY MOUTH AT BEDTIME    Hypercholesterolemia

## 2020-01-24 NOTE — PROGRESS NOTES
Subjective     Ten Flores is a 74 year old male who presents to clinic today for the following health issues:    HPI   Hyperlipidemia Follow-Up      Are you regularly taking any medication or supplement to lower your cholesterol?   Yes- simvastatin    Are you having muscle aches or other side effects that you think could be caused by your cholesterol lowering medication?  No      Hypertension Follow-up      Do you check your blood pressure regularly outside of the clinic? Yes     Are you following a low salt diet? Yes    Are your blood pressures ever more than 140 on the top number (systolic) OR more   than 90 on the bottom number (diastolic), for example 140/90? No      How many servings of fruits and vegetables do you eat daily?  2-3    On average, how many sweetened beverages do you drink each day (Examples: soda, juice, sweet tea, etc.  Do NOT count diet or artificially sweetened beverages)?   4    How many days per week do you exercise enough to make your heart beat faster? 5    How many minutes a day do you exercise enough to make your heart beat faster? 30 - 60    How many days per week do you miss taking your medication? 0      Concern - left shoulder pain, requesting steroid injection   Onset: about 1 year ago    Description:   N/A    Intensity: N/A    Progression of Symptoms:  worsening    Accompanying Signs & Symptoms:   Slowly increasing pain    Previous history of similar problem:   Hx of shoulder pain    Precipitating factors:   Worsened by: excessive movemnet    Alleviating factors:  Improved by: rest   Therapies Tried and outcome: injections      Patient does bring in a bottle of Tylenol #3 from a few years ago.  He states he uses them rarely when his back really spasms up.  He has one pill left and wonders if he can have a refill.      Patient Active Problem List   Diagnosis     Advanced care planning/counseling discussion     Benign essential hypertension     Hypercholesterolemia     Hip pain,  right     Chronic rhinitis     h/o Chewing tobacco nicotine dependence     Past Surgical History:   Procedure Laterality Date     DERMATOLOGY IP CONSULT       GENITOURINARY SURGERY      circ     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         Social History     Tobacco Use     Smoking status: Former Smoker     Packs/day: 0.50     Years: 5.00     Pack years: 2.50     Types: Cigarettes     Last attempt to quit: 1965     Years since quittin.1     Smokeless tobacco: Former User     Tobacco comment: Tried to Quit (YES)   Substance Use Topics     Alcohol use: Yes     Alcohol/week: 0.0 standard drinks     Comment: DAILY,wine,beer     Family History   Problem Relation Age of Onset     Heart Disease Mother 68        Disease - Cause of Death     Heart Disease Father 89        Disease - Cause of Death         Current Outpatient Medications   Medication Sig Dispense Refill     acetaminophen (TYLENOL) 500 MG tablet Take 2 tablets (1,000 mg) by mouth every 8 hours as needed 100 tablet 1     acetaminophen-codeine (TYLENOL #3) 300-30 MG per tablet Take 1 tablet by mouth every 6 hours as needed for severe pain maximum 6 tablet(s) per day 20 tablet 0     aspirin 81 MG tablet Take by mouth daily       budesonide (RINOCORT AQUA) 32 MCG/ACT nasal spray Spray 2 sprays into both nostrils daily 1 Bottle 11     fish oil-omega-3 fatty acids (FISH OIL) 1000 MG capsule Take 2 g by mouth daily        fluticasone (FLONASE) 50 MCG/ACT nasal spray Spray 1 spray into both nostrils daily 16 g 11     meloxicam (MOBIC) 7.5 MG tablet Take 1 tablet (7.5 mg) by mouth daily 90 tablet 3     simvastatin (ZOCOR) 40 MG tablet Take 1 tablet (40 mg) by mouth At Bedtime 90 tablet 3     tiZANidine (ZANAFLEX) 4 MG tablet Take 1 tablet (4 mg) by mouth 3 times daily as needed for muscle spasms 30 tablet 1     Allergies   Allergen Reactions     Sulfasuccinamide Sodium Hives     Sulfa Drugs Rash         Reviewed and updated as needed  "this visit by Provider  Tobacco  Allergies  Meds  Problems  Med Hx  Surg Hx  Fam Hx         Review of Systems   ROS COMP: Constitutional, HEENT, cardiovascular, pulmonary, gi and gu systems are negative, except as otherwise noted.      Objective    /64 (BP Location: Right arm, Patient Position: Chair, Cuff Size: Adult Regular)   Pulse 67   Temp 98.4  F (36.9  C) (Tympanic)   Ht 1.715 m (5' 7.5\")   Wt 75.8 kg (167 lb)   SpO2 95%   BMI 25.77 kg/m    Body mass index is 25.77 kg/m .  Physical Exam   GENERAL: healthy, alert and no distress  PSYCH: mentation appears normal, affect normal/bright    Diagnostic Test Results:  Labs reviewed in Epic        Procedure: steroid injection of left shoulder using posterior approach  Indication: pain, arthritis  Consent: verbal and written consent was obtained from patient after risks and benefits are discussed  Description:  Time out procedure is completed.  Joint space is identified and marked.  Betadine prep is completed.  Ethyl chloride spray is used for topical anesthesia.  1 cc Kenalog 40mg/cc and 4 cc 1% Lidocaine without epinephrine are injected without difficulty.  Patient tolerated procedure well.      Assessment & Plan     1. Hypercholesterolemia  Labs reviewed, no medication changes recommended.  Follow-up in six months, future lab orders will be placed.    2. Benign essential hypertension  As above.    3. Chronic left shoulder pain  Patient tolerated injection well, monitor for signs of infection.  - Large Joint/Bursa injection and/or drainage (Shoulder, Knee)  - triamcinolone (KENALOG-40) injection 40 mg  - lidocaine 1 % injection 4 mL    4. Primary osteoarthritis, left shoulder   As above.  - Large Joint/Bursa injection and/or drainage (Shoulder, Knee)    5. Chronic bilateral low back pain without sciatica  We talked about narcotics prescriptions and alternative treatments.  He states the worst symptoms is \"spasming\" and the T#3 helps with that.  He " "agrees to try an alternative medication, Zanaflex is sent.  Follow-up if no improvement noted.  - tiZANidine (ZANAFLEX) 4 MG tablet; Take 1 tablet (4 mg) by mouth 3 times daily as needed for muscle spasms  Dispense: 30 tablet; Refill: 1     BMI:   Estimated body mass index is 25.77 kg/m  as calculated from the following:    Height as of this encounter: 1.715 m (5' 7.5\").    Weight as of this encounter: 75.8 kg (167 lb).         Return in about 6 months (around 7/30/2020) for Chronic Disease Management.    Hodan Tapia MD  [facility]      "

## 2020-01-27 DIAGNOSIS — I10 BENIGN ESSENTIAL HYPERTENSION: ICD-10-CM

## 2020-01-27 DIAGNOSIS — E78.00 HYPERCHOLESTEROLEMIA: Primary | ICD-10-CM

## 2020-01-27 LAB
ALT SERPL W P-5'-P-CCNC: 45 U/L (ref 0–70)
ANION GAP SERPL CALCULATED.3IONS-SCNC: 8 MMOL/L (ref 3–14)
BUN SERPL-MCNC: 19 MG/DL (ref 7–30)
CALCIUM SERPL-MCNC: 9 MG/DL (ref 8.5–10.1)
CHLORIDE SERPL-SCNC: 106 MMOL/L (ref 94–109)
CHOLEST SERPL-MCNC: 178 MG/DL
CO2 SERPL-SCNC: 26 MMOL/L (ref 20–32)
CREAT SERPL-MCNC: 1.1 MG/DL (ref 0.66–1.25)
GFR SERPL CREATININE-BSD FRML MDRD: 65 ML/MIN/{1.73_M2}
GLUCOSE SERPL-MCNC: 90 MG/DL (ref 70–99)
HDLC SERPL-MCNC: 45 MG/DL
LDLC SERPL CALC-MCNC: 110 MG/DL
NONHDLC SERPL-MCNC: 133 MG/DL
POTASSIUM SERPL-SCNC: 3.9 MMOL/L (ref 3.4–5.3)
SODIUM SERPL-SCNC: 140 MMOL/L (ref 133–144)
TRIGL SERPL-MCNC: 116 MG/DL

## 2020-01-27 PROCEDURE — 36415 COLL VENOUS BLD VENIPUNCTURE: CPT | Mod: ZL | Performed by: FAMILY MEDICINE

## 2020-01-27 PROCEDURE — 80061 LIPID PANEL: CPT | Mod: ZL | Performed by: FAMILY MEDICINE

## 2020-01-27 PROCEDURE — 84460 ALANINE AMINO (ALT) (SGPT): CPT | Mod: ZL | Performed by: FAMILY MEDICINE

## 2020-01-27 PROCEDURE — 80048 BASIC METABOLIC PNL TOTAL CA: CPT | Mod: ZL | Performed by: FAMILY MEDICINE

## 2020-01-30 ENCOUNTER — OFFICE VISIT (OUTPATIENT)
Dept: FAMILY MEDICINE | Facility: OTHER | Age: 75
End: 2020-01-30
Attending: FAMILY MEDICINE
Payer: COMMERCIAL

## 2020-01-30 VITALS
OXYGEN SATURATION: 95 % | SYSTOLIC BLOOD PRESSURE: 122 MMHG | DIASTOLIC BLOOD PRESSURE: 64 MMHG | WEIGHT: 167 LBS | TEMPERATURE: 98.4 F | BODY MASS INDEX: 25.31 KG/M2 | HEART RATE: 67 BPM | HEIGHT: 68 IN

## 2020-01-30 DIAGNOSIS — G89.29 CHRONIC BILATERAL LOW BACK PAIN WITHOUT SCIATICA: ICD-10-CM

## 2020-01-30 DIAGNOSIS — I10 BENIGN ESSENTIAL HYPERTENSION: ICD-10-CM

## 2020-01-30 DIAGNOSIS — Z12.5 SPECIAL SCREENING FOR MALIGNANT NEOPLASM OF PROSTATE: ICD-10-CM

## 2020-01-30 DIAGNOSIS — G89.29 CHRONIC LEFT SHOULDER PAIN: ICD-10-CM

## 2020-01-30 DIAGNOSIS — M19.012 PRIMARY OSTEOARTHRITIS, LEFT SHOULDER: ICD-10-CM

## 2020-01-30 DIAGNOSIS — E78.00 HYPERCHOLESTEROLEMIA: Primary | ICD-10-CM

## 2020-01-30 DIAGNOSIS — M54.50 CHRONIC BILATERAL LOW BACK PAIN WITHOUT SCIATICA: ICD-10-CM

## 2020-01-30 DIAGNOSIS — M25.512 CHRONIC LEFT SHOULDER PAIN: ICD-10-CM

## 2020-01-30 PROCEDURE — 99214 OFFICE O/P EST MOD 30 MIN: CPT | Mod: 25 | Performed by: FAMILY MEDICINE

## 2020-01-30 PROCEDURE — G0463 HOSPITAL OUTPT CLINIC VISIT: HCPCS

## 2020-01-30 PROCEDURE — 20610 DRAIN/INJ JOINT/BURSA W/O US: CPT | Mod: LT | Performed by: FAMILY MEDICINE

## 2020-01-30 PROCEDURE — G0463 HOSPITAL OUTPT CLINIC VISIT: HCPCS | Mod: 25

## 2020-01-30 RX ORDER — TRIAMCINOLONE ACETONIDE 40 MG/ML
40 INJECTION, SUSPENSION INTRA-ARTICULAR; INTRAMUSCULAR ONCE
Status: COMPLETED | OUTPATIENT
Start: 2020-01-30 | End: 2020-01-30

## 2020-01-30 RX ORDER — LIDOCAINE HYDROCHLORIDE 10 MG/ML
4 INJECTION, SOLUTION INFILTRATION; PERINEURAL ONCE
Status: COMPLETED | OUTPATIENT
Start: 2020-01-30 | End: 2020-01-30

## 2020-01-30 RX ADMIN — TRIAMCINOLONE ACETONIDE 40 MG: 40 INJECTION, SUSPENSION INTRA-ARTICULAR; INTRAMUSCULAR at 10:52

## 2020-01-30 RX ADMIN — LIDOCAINE HYDROCHLORIDE 4 ML: 10 INJECTION, SOLUTION INFILTRATION; PERINEURAL at 10:52

## 2020-01-30 ASSESSMENT — ANXIETY QUESTIONNAIRES
GAD7 TOTAL SCORE: 0
IF YOU CHECKED OFF ANY PROBLEMS ON THIS QUESTIONNAIRE, HOW DIFFICULT HAVE THESE PROBLEMS MADE IT FOR YOU TO DO YOUR WORK, TAKE CARE OF THINGS AT HOME, OR GET ALONG WITH OTHER PEOPLE: NOT DIFFICULT AT ALL
3. WORRYING TOO MUCH ABOUT DIFFERENT THINGS: NOT AT ALL
6. BECOMING EASILY ANNOYED OR IRRITABLE: NOT AT ALL
2. NOT BEING ABLE TO STOP OR CONTROL WORRYING: NOT AT ALL
5. BEING SO RESTLESS THAT IT IS HARD TO SIT STILL: NOT AT ALL
7. FEELING AFRAID AS IF SOMETHING AWFUL MIGHT HAPPEN: NOT AT ALL
1. FEELING NERVOUS, ANXIOUS, OR ON EDGE: NOT AT ALL
4. TROUBLE RELAXING: NOT AT ALL

## 2020-01-30 ASSESSMENT — PATIENT HEALTH QUESTIONNAIRE - PHQ9: SUM OF ALL RESPONSES TO PHQ QUESTIONS 1-9: 0

## 2020-01-30 ASSESSMENT — PAIN SCALES - GENERAL: PAINLEVEL: NO PAIN (0)

## 2020-01-30 ASSESSMENT — MIFFLIN-ST. JEOR: SCORE: 1459.07

## 2020-01-30 NOTE — NURSING NOTE
"Chief Complaint   Patient presents with     Follow Up     left shoulder      Hypertension     Lipids       Initial /64 (BP Location: Right arm, Patient Position: Chair, Cuff Size: Adult Regular)   Pulse 67   Temp 98.4  F (36.9  C) (Tympanic)   Ht 1.715 m (5' 7.5\")   Wt 75.8 kg (167 lb)   SpO2 95%   BMI 25.77 kg/m   Estimated body mass index is 25.77 kg/m  as calculated from the following:    Height as of this encounter: 1.715 m (5' 7.5\").    Weight as of this encounter: 75.8 kg (167 lb).  Medication Reconciliation: complete  Neris Gaitan LPN    "

## 2020-01-31 ASSESSMENT — ANXIETY QUESTIONNAIRES: GAD7 TOTAL SCORE: 0

## 2020-02-11 DIAGNOSIS — E78.00 HYPERCHOLESTEROLEMIA: ICD-10-CM

## 2020-02-11 RX ORDER — SIMVASTATIN 40 MG
TABLET ORAL
Qty: 90 TABLET | Refills: 1 | Status: SHIPPED | OUTPATIENT
Start: 2020-02-11 | End: 2020-08-06

## 2020-03-28 DIAGNOSIS — M79.671 RIGHT FOOT PAIN: ICD-10-CM

## 2020-03-30 RX ORDER — MELOXICAM 7.5 MG/1
TABLET ORAL
Qty: 90 TABLET | Refills: 0 | Status: SHIPPED | OUTPATIENT
Start: 2020-03-30 | End: 2020-06-25

## 2020-03-30 NOTE — TELEPHONE ENCOUNTER
Meera      Last Written Prescription Date:  1/25/19  Last Fill Quantity: 90,   # refills: 3  Last Office Visit: 1/30/20  Future Office visit:       Routing refill request to provider for review/approval because:

## 2020-03-30 NOTE — TELEPHONE ENCOUNTER
Protocol failed due to    Patient is age 6-64 years Protocol Details    Normal CBC on file in past 12 months      Last CBC in 2016. Please advise. Thank you!

## 2020-04-30 ENCOUNTER — OFFICE VISIT (OUTPATIENT)
Dept: FAMILY MEDICINE | Facility: OTHER | Age: 75
End: 2020-04-30
Attending: NURSE PRACTITIONER
Payer: COMMERCIAL

## 2020-04-30 VITALS
BODY MASS INDEX: 24.8 KG/M2 | DIASTOLIC BLOOD PRESSURE: 74 MMHG | TEMPERATURE: 98.5 F | HEART RATE: 72 BPM | WEIGHT: 163.6 LBS | HEIGHT: 68 IN | SYSTOLIC BLOOD PRESSURE: 154 MMHG | OXYGEN SATURATION: 96 %

## 2020-04-30 DIAGNOSIS — J01.90 ACUTE SINUSITIS WITH SYMPTOMS GREATER THAN 10 DAYS: Primary | ICD-10-CM

## 2020-04-30 PROCEDURE — 99213 OFFICE O/P EST LOW 20 MIN: CPT | Performed by: NURSE PRACTITIONER

## 2020-04-30 PROCEDURE — G0463 HOSPITAL OUTPT CLINIC VISIT: HCPCS

## 2020-04-30 ASSESSMENT — PAIN SCALES - GENERAL: PAINLEVEL: NO PAIN (0)

## 2020-04-30 ASSESSMENT — MIFFLIN-ST. JEOR: SCORE: 1438.64

## 2020-04-30 NOTE — PATIENT INSTRUCTIONS
"    Assessment & Plan     1. Acute sinusitis with symptoms greater than 10 days  Could consider claritin or zyrtec, nasacort or flonase  - amoxicillin-clavulanate (AUGMENTIN) 875-125 MG tablet; Take 1 tablet by mouth 2 times daily for 10 days  Dispense: 20 tablet; Refill: 0     BMI:   Estimated body mass index is 25.25 kg/m  as calculated from the following:    Height as of this encounter: 1.715 m (5' 7.5\").    Weight as of this encounter: 74.2 kg (163 lb 9.6 oz).             Return if symptoms worsen or fail to improve.    Amber Davila,   Certified Adult Nurse Practitioner  [phone]      [facility] [place]          "

## 2020-04-30 NOTE — PROGRESS NOTES
"Subjective     Ten Flores is a 75 year old male who presents to clinic today for the following health issues:    HPI   Sinus Infection      Duration: sinus drainage over a month \"tastes infected\" with lightheadedness when laying down x 3 weeks, post nasal drainage.      Description (location/character/radiation): sinus drainage , light headed , sinus headache.  Denies fever, chills, cough, abdominal pain, myalgia.  Is fatigued from sinus issues.      Intensity:  mild    Accompanying signs and symptoms: sinus drainage     History (similar episodes/previous evaluation): has a history of sinus issues     Precipitating or alleviating factors: None    Therapies tried and outcome: nasal spray        Patient Active Problem List   Diagnosis     Advanced care planning/counseling discussion     Benign essential hypertension     Hypercholesterolemia     Hip pain, right     Chronic rhinitis     h/o Chewing tobacco nicotine dependence     Past Surgical History:   Procedure Laterality Date     DERMATOLOGY IP CONSULT       GENITOURINARY SURGERY      circ     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         Social History     Tobacco Use     Smoking status: Former Smoker     Packs/day: 0.50     Years: 5.00     Pack years: 2.50     Types: Cigarettes     Last attempt to quit: 1965     Years since quittin.3     Smokeless tobacco: Former User     Tobacco comment: Tried to Quit (YES)   Substance Use Topics     Alcohol use: Yes     Alcohol/week: 0.0 standard drinks     Comment: DAILY,wine,beer     Family History   Problem Relation Age of Onset     Heart Disease Mother 68        Disease - Cause of Death     Heart Disease Father 89        Disease - Cause of Death         Current Outpatient Medications   Medication Sig Dispense Refill     acetaminophen (TYLENOL) 500 MG tablet Take 2 tablets (1,000 mg) by mouth every 8 hours as needed 100 tablet 1     amoxicillin-clavulanate (AUGMENTIN) 875-125 MG tablet " "Take 1 tablet by mouth 2 times daily for 10 days 20 tablet 0     aspirin 81 MG tablet Take by mouth daily       budesonide (RINOCORT AQUA) 32 MCG/ACT nasal spray Spray 2 sprays into both nostrils daily 1 Bottle 11     fish oil-omega-3 fatty acids (FISH OIL) 1000 MG capsule Take 2 g by mouth daily        meloxicam (MOBIC) 7.5 MG tablet TAKE 1 TABLET BY MOUTH EVERY DAY 90 tablet 0     simvastatin (ZOCOR) 40 MG tablet TAKE 1 TABLET BY MOUTH AT BEDTIME 90 tablet 1     tiZANidine (ZANAFLEX) 4 MG tablet Take 1 tablet (4 mg) by mouth 3 times daily as needed for muscle spasms 30 tablet 1     acetaminophen-codeine (TYLENOL #3) 300-30 MG per tablet Take 1 tablet by mouth every 6 hours as needed for severe pain maximum 6 tablet(s) per day (Patient not taking: Reported on 4/30/2020) 20 tablet 0     Allergies   Allergen Reactions     Sulfasuccinamide Sodium Hives     Sulfa Drugs Rash     Recent Labs   Lab Test 01/27/20  0750 07/26/19  0800 01/23/19  0808   * 88 111*   HDL 45 48 46   TRIG 116 101 150*   ALT 45 42 44   CR 1.10 1.09 1.12   GFRESTIMATED 65 66 64   GFRESTBLACK 75 76 74   POTASSIUM 3.9 4.2 4.1   TSH  --  2.51  --       BP Readings from Last 3 Encounters:   04/30/20 (!) 154/74   01/30/20 122/64   07/31/19 134/70    Wt Readings from Last 3 Encounters:   04/30/20 74.2 kg (163 lb 9.6 oz)   01/30/20 75.8 kg (167 lb)   07/31/19 76.5 kg (168 lb 11.2 oz)                 Reviewed and updated as needed this visit by Provider  Allergies         Review of Systems   ROS COMP: Constitutional, HEENT, cardiovascular, pulmonary, gi and gu systems are negative, except as otherwise noted.      Objective    BP (!) 154/74 (BP Location: Right arm, Patient Position: Chair, Cuff Size: Adult Large)   Pulse 72   Temp 98.5  F (36.9  C) (Tympanic)   Ht 1.715 m (5' 7.5\")   Wt 74.2 kg (163 lb 9.6 oz)   SpO2 96%   BMI 25.25 kg/m    Body mass index is 25.25 kg/m .  Physical Exam   GENERAL: healthy, alert and no distress  HENT: normal " "cephalic/atraumatic, both ears: erythematous, nasal mucosa edematous , rhinorrhea yellow, green and thick and oral mucous membranes moist, thick post nasal drainage noted.  Sinus tenderness.    NECK: no adenopathy, no asymmetry, masses, or scars and thyroid normal to palpation  RESP: lungs clear to auscultation - no rales, rhonchi or wheezes  CV: regular rate and rhythm, normal S1 S2, no S3 or S4, no murmur, click or rub, no peripheral edema and peripheral pulses strong  MS: no gross musculoskeletal defects noted, no edema  PSYCH: mentation appears normal, affect normal/bright            Assessment & Plan     1. Acute sinusitis with symptoms greater than 10 days  Could consider claritin or zyrtec, nasacort or flonase  - amoxicillin-clavulanate (AUGMENTIN) 875-125 MG tablet; Take 1 tablet by mouth 2 times daily for 10 days  Dispense: 20 tablet; Refill: 0     BMI:   Estimated body mass index is 25.25 kg/m  as calculated from the following:    Height as of this encounter: 1.715 m (5' 7.5\").    Weight as of this encounter: 74.2 kg (163 lb 9.6 oz).             Return if symptoms worsen or fail to improve.    Amber Davila,   Certified Adult Nurse Practitioner  [phone]      [facility] - [place]        "

## 2020-04-30 NOTE — NURSING NOTE
"Chief Complaint   Patient presents with     Sinus Problem     sinus drainage x 1 month      Dizziness     when laying down        Initial BP (!) 154/74 (BP Location: Right arm, Patient Position: Chair, Cuff Size: Adult Large)   Pulse 72   Temp 98.5  F (36.9  C) (Tympanic)   Ht 1.715 m (5' 7.5\")   Wt 74.2 kg (163 lb 9.6 oz)   SpO2 96%   BMI 25.25 kg/m   Estimated body mass index is 25.25 kg/m  as calculated from the following:    Height as of this encounter: 1.715 m (5' 7.5\").    Weight as of this encounter: 74.2 kg (163 lb 9.6 oz).  Medication Reconciliation: complete  Estela Wright LPN  "

## 2020-06-23 DIAGNOSIS — M79.671 RIGHT FOOT PAIN: ICD-10-CM

## 2020-06-23 NOTE — TELEPHONE ENCOUNTER
Meloxicam      Last Written Prescription Date:  3-30-20  Last Fill Quantity: 90,   # refills: 0  Last Office Visit: 4-30-20  Future Office visit:    Next 5 appointments (look out 90 days)    Aug 06, 2020  9:45 AM CDT  (Arrive by 9:30 AM)  SHORT with Hodan Tapia MD  [facility] ) [address]  [facility] [address]  [phone]

## 2020-06-24 NOTE — TELEPHONE ENCOUNTER
Refill protocol failed due to the following:     Blood pressure under 140/90 in past 12 months         BP Readings from Last 3 Encounters:   04/30/20 (!) 154/74   01/30/20 122/64   07/31/19 134/70                Patient is age 6-64 years         Normal CBC on file in past 12 months         Recent Labs   Lab Test 10/04/16  1433   WBC 9.9   RBC 5.01   HGB 15.3   HCT 44.8

## 2020-06-25 DIAGNOSIS — H91.93 DECREASED HEARING OF BOTH EARS: Primary | ICD-10-CM

## 2020-06-25 RX ORDER — MELOXICAM 7.5 MG/1
TABLET ORAL
Qty: 90 TABLET | Refills: 1 | Status: SHIPPED | OUTPATIENT
Start: 2020-06-25 | End: 2020-11-10

## 2020-07-15 ENCOUNTER — OFFICE VISIT (OUTPATIENT)
Dept: AUDIOLOGY | Facility: OTHER | Age: 75
End: 2020-07-15
Attending: AUDIOLOGIST
Payer: COMMERCIAL

## 2020-07-15 DIAGNOSIS — H90.3 SENSORINEURAL HEARING LOSS (SNHL) OF BOTH EARS: Primary | ICD-10-CM

## 2020-07-15 DIAGNOSIS — H91.93 DECREASED HEARING OF BOTH EARS: ICD-10-CM

## 2020-07-15 PROCEDURE — 92550 TYMPANOMETRY & REFLEX THRESH: CPT | Performed by: AUDIOLOGIST

## 2020-07-15 PROCEDURE — V5299 HEARING SERVICE: HCPCS

## 2020-07-15 PROCEDURE — 92557 COMPREHENSIVE HEARING TEST: CPT | Performed by: AUDIOLOGIST

## 2020-07-15 NOTE — PROGRESS NOTES
Audiology Evaluation Completed. Please refer SCANNED AUDIOGRAM and/or TYMPANOGRAM for BACKGROUND, RESULTS, RECOMMENDATIONS.      Sadie FAIRBANKS, [facility]  Audiologist #[id number]

## 2020-07-15 NOTE — PROGRESS NOTES
HEARING AID CHECK    BACKGROUND:  Ten Flores, a 75 year old male, was seen today for an hearing aid check.  Mr. Flores wears Binaural Oticon OPN 3 hearing aids.  Mr. Flores reported no concerns.    FINDINGS:  Visual inspection and cleaning provided.   C-stop changed with new. Oticon 10mm closed domes changed with new. Battery was tested and good. Good listening check.    Reviewed cleaning, troubleshooting, and preventative care with patient. Any cleaning tools used were provided as customer courtesy.    Services performed and warranty reviewed with patient.    PLAN:  Based on patient report, no audiology appointment for adjustments needed.Mr. Flores will return to clinic in 12 months, sooner if needed. Patient had no further questions and reports satisfaction.         Kavitha Cabello  Audiology Assistant  [facility]  [phone]

## 2020-07-31 DIAGNOSIS — E78.00 HYPERCHOLESTEROLEMIA: ICD-10-CM

## 2020-07-31 DIAGNOSIS — I10 BENIGN ESSENTIAL HYPERTENSION: ICD-10-CM

## 2020-07-31 DIAGNOSIS — Z12.5 SPECIAL SCREENING FOR MALIGNANT NEOPLASM OF PROSTATE: ICD-10-CM

## 2020-07-31 LAB
ALT SERPL W P-5'-P-CCNC: 45 U/L (ref 0–70)
ANION GAP SERPL CALCULATED.3IONS-SCNC: 5 MMOL/L (ref 3–14)
BUN SERPL-MCNC: 18 MG/DL (ref 7–30)
CALCIUM SERPL-MCNC: 9.1 MG/DL (ref 8.5–10.1)
CHLORIDE SERPL-SCNC: 106 MMOL/L (ref 94–109)
CHOLEST SERPL-MCNC: 172 MG/DL
CO2 SERPL-SCNC: 28 MMOL/L (ref 20–32)
CREAT SERPL-MCNC: 1.09 MG/DL (ref 0.66–1.25)
GFR SERPL CREATININE-BSD FRML MDRD: 65 ML/MIN/{1.73_M2}
GLUCOSE SERPL-MCNC: 100 MG/DL (ref 70–99)
HDLC SERPL-MCNC: 51 MG/DL
LDLC SERPL CALC-MCNC: 99 MG/DL
NONHDLC SERPL-MCNC: 121 MG/DL
POTASSIUM SERPL-SCNC: 4.1 MMOL/L (ref 3.4–5.3)
PSA SERPL-ACNC: 1.89 UG/L (ref 0–4)
SODIUM SERPL-SCNC: 139 MMOL/L (ref 133–144)
TRIGL SERPL-MCNC: 108 MG/DL

## 2020-07-31 PROCEDURE — G0103 PSA SCREENING: HCPCS | Mod: ZL | Performed by: FAMILY MEDICINE

## 2020-07-31 PROCEDURE — 84460 ALANINE AMINO (ALT) (SGPT): CPT | Mod: ZL | Performed by: FAMILY MEDICINE

## 2020-07-31 PROCEDURE — 80048 BASIC METABOLIC PNL TOTAL CA: CPT | Mod: ZL | Performed by: FAMILY MEDICINE

## 2020-07-31 PROCEDURE — 36415 COLL VENOUS BLD VENIPUNCTURE: CPT | Mod: ZL | Performed by: FAMILY MEDICINE

## 2020-07-31 PROCEDURE — 80061 LIPID PANEL: CPT | Mod: ZL | Performed by: FAMILY MEDICINE

## 2020-08-05 NOTE — PROGRESS NOTES
Subjective     Ten Flores is a 75 year old male who presents to clinic today for the following health issues:    HPI     Hyperlipidemia Follow-Up      Are you regularly taking any medication or supplement to lower your cholesterol?   Yes- simvastatin    Are you having muscle aches or other side effects that you think could be caused by your cholesterol lowering medication?  No      Hypertension Follow-up      Do you check your blood pressure regularly outside of the clinic? Yes     Are you following a low salt diet? Yes    Are your blood pressures ever more than 140 on the top number (systolic) OR more   than 90 on the bottom number (diastolic), for example 140/90? No      How many servings of fruits and vegetables do you eat daily?  2-3    On average, how many sweetened beverages do you drink each day (Examples: soda, juice, sweet tea, etc.  Do NOT count diet or artificially sweetened beverages)?   2    How many days per week do you exercise enough to make your heart beat faster? 7    How many minutes a day do you exercise enough to make your heart beat faster? 60 or more    How many days per week do you miss taking your medication? 0    Patient states medications have been helping with chronic rhinitis.    Chronic/Recurring Back Pain Follow Up      Where is your back pain located? (Select all that apply) low back bilateral    How would you describe your back pain?  cramping, fullness and gnawing    Where does your back pain spread? nowhere    Since your last clinic visit for back pain, how has your pain changed? Only occurs intermittently, maybe twice a year    Does your back pain interfere with your job? Not applicable    Since your last visit, have you tried any new treatment? No     Patient states he will have flares of symptoms a couple times a year with increased activity.  He states a few T#3 calms pain right away.  He wonders if he can have a refill for flares, he states he will use at most 5 pills per  flare, and maybe has 2 flares a year.      Patient Active Problem List   Diagnosis     Advanced care planning/counseling discussion     Benign essential hypertension     Hypercholesterolemia     Hip pain, right     Chronic rhinitis     h/o Chewing tobacco nicotine dependence     Past Surgical History:   Procedure Laterality Date     DERMATOLOGY IP CONSULT       GENITOURINARY SURGERY      circ     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         Social History     Tobacco Use     Smoking status: Former Smoker     Packs/day: 0.50     Years: 5.00     Pack years: 2.50     Types: Cigarettes     Last attempt to quit: 1965     Years since quittin.6     Smokeless tobacco: Former User     Tobacco comment: Tried to Quit (YES)   Substance Use Topics     Alcohol use: Yes     Alcohol/week: 0.0 standard drinks     Comment: DAILY,wine,beer     Family History   Problem Relation Age of Onset     Heart Disease Mother 68        Disease - Cause of Death     Heart Disease Father 89        Disease - Cause of Death         Current Outpatient Medications   Medication Sig Dispense Refill     acetaminophen (TYLENOL) 500 MG tablet Take 2 tablets (1,000 mg) by mouth every 8 hours as needed 100 tablet 1     acetaminophen-codeine (TYLENOL #3) 300-30 MG tablet Take 1 tablet by mouth every 6 hours as needed for severe pain 10 tablet 0     aspirin 81 MG tablet Take by mouth daily       budesonide (RINOCORT AQUA) 32 MCG/ACT nasal spray Spray 2 sprays into both nostrils daily 1 Bottle 11     fish oil-omega-3 fatty acids (FISH OIL) 1000 MG capsule Take 2 g by mouth daily        meloxicam (MOBIC) 7.5 MG tablet TAKE 1 TABLET BY MOUTH EVERY DAY 90 tablet 1     simvastatin (ZOCOR) 40 MG tablet Take 1 tablet (40 mg) by mouth At Bedtime 90 tablet 1     tiZANidine (ZANAFLEX) 4 MG tablet Take 1 tablet (4 mg) by mouth 3 times daily as needed for muscle spasms 30 tablet 1     Allergies   Allergen Reactions     Sulfasuccinamide  "Sodium Hives     Sulfa Drugs Rash       Reviewed and updated as needed this visit by Provider  Tobacco  Allergies  Meds  Problems  Med Hx  Surg Hx  Fam Hx         Review of Systems   Constitutional, HEENT, cardiovascular, pulmonary, gi and gu systems are negative, except as otherwise noted.      Objective    BP (!) 144/66 (BP Location: Left arm, Patient Position: Sitting, Cuff Size: Adult Regular)   Pulse 88   Temp 97.1  F (36.2  C) (Tympanic)   Resp 16   Ht 1.715 m (5' 7.5\")   Wt 74.3 kg (163 lb 12.8 oz)   SpO2 96%   BMI 25.28 kg/m    Body mass index is 25.28 kg/m .  Physical Exam   GENERAL: healthy, alert and no distress  RESP: lungs clear to auscultation - no rales, rhonchi or wheezes  CV: regular rates and rhythm  PSYCH: mentation appears normal, affect normal/bright    Diagnostic Test Results:  Labs reviewed in Epic        Assessment & Plan     1. Hypercholesterolemia  Medication refilled, no changes needed.  - simvastatin (ZOCOR) 40 MG tablet; Take 1 tablet (40 mg) by mouth At Bedtime  Dispense: 90 tablet; Refill: 1    2. Benign essential hypertension  No medication changes needed.    3. Chronic rhinitis  Continue current medications.    4. Acute bilateral low back pain without sciatica  Refill given for intermittent symptoms.  - acetaminophen-codeine (TYLENOL #3) 300-30 MG tablet; Take 1 tablet by mouth every 6 hours as needed for severe pain  Dispense: 10 tablet; Refill: 0     BMI:   Estimated body mass index is 25.28 kg/m  as calculated from the following:    Height as of this encounter: 1.715 m (5' 7.5\").    Weight as of this encounter: 74.3 kg (163 lb 12.8 oz).      Return in about 6 months (around 2/6/2021) for Medication review, Chronic Disease Management.    Hodan Tapia MD  [facility]  "

## 2020-08-06 ENCOUNTER — OFFICE VISIT (OUTPATIENT)
Dept: FAMILY MEDICINE | Facility: OTHER | Age: 75
End: 2020-08-06
Attending: FAMILY MEDICINE
Payer: COMMERCIAL

## 2020-08-06 VITALS
HEIGHT: 68 IN | WEIGHT: 163.8 LBS | BODY MASS INDEX: 24.83 KG/M2 | DIASTOLIC BLOOD PRESSURE: 66 MMHG | OXYGEN SATURATION: 96 % | TEMPERATURE: 97.1 F | RESPIRATION RATE: 16 BRPM | HEART RATE: 88 BPM | SYSTOLIC BLOOD PRESSURE: 144 MMHG

## 2020-08-06 DIAGNOSIS — E78.00 HYPERCHOLESTEROLEMIA: Primary | ICD-10-CM

## 2020-08-06 DIAGNOSIS — M54.50 ACUTE BILATERAL LOW BACK PAIN WITHOUT SCIATICA: ICD-10-CM

## 2020-08-06 DIAGNOSIS — J31.0 CHRONIC RHINITIS: ICD-10-CM

## 2020-08-06 DIAGNOSIS — I10 BENIGN ESSENTIAL HYPERTENSION: ICD-10-CM

## 2020-08-06 PROCEDURE — 99214 OFFICE O/P EST MOD 30 MIN: CPT | Performed by: FAMILY MEDICINE

## 2020-08-06 PROCEDURE — G0463 HOSPITAL OUTPT CLINIC VISIT: HCPCS

## 2020-08-06 RX ORDER — SIMVASTATIN 40 MG
40 TABLET ORAL AT BEDTIME
Qty: 90 TABLET | Refills: 1 | Status: SHIPPED | OUTPATIENT
Start: 2020-08-06 | End: 2021-02-08

## 2020-08-06 ASSESSMENT — ANXIETY QUESTIONNAIRES
2. NOT BEING ABLE TO STOP OR CONTROL WORRYING: NOT AT ALL
6. BECOMING EASILY ANNOYED OR IRRITABLE: NOT AT ALL
7. FEELING AFRAID AS IF SOMETHING AWFUL MIGHT HAPPEN: NOT AT ALL
1. FEELING NERVOUS, ANXIOUS, OR ON EDGE: NOT AT ALL
IF YOU CHECKED OFF ANY PROBLEMS ON THIS QUESTIONNAIRE, HOW DIFFICULT HAVE THESE PROBLEMS MADE IT FOR YOU TO DO YOUR WORK, TAKE CARE OF THINGS AT HOME, OR GET ALONG WITH OTHER PEOPLE: NOT DIFFICULT AT ALL
5. BEING SO RESTLESS THAT IT IS HARD TO SIT STILL: NOT AT ALL
3. WORRYING TOO MUCH ABOUT DIFFERENT THINGS: NOT AT ALL
4. TROUBLE RELAXING: SEVERAL DAYS
GAD7 TOTAL SCORE: 1

## 2020-08-06 ASSESSMENT — MIFFLIN-ST. JEOR: SCORE: 1439.55

## 2020-08-06 ASSESSMENT — PATIENT HEALTH QUESTIONNAIRE - PHQ9: SUM OF ALL RESPONSES TO PHQ QUESTIONS 1-9: 0

## 2020-08-06 ASSESSMENT — PAIN SCALES - GENERAL: PAINLEVEL: NO PAIN (0)

## 2020-08-06 NOTE — NURSING NOTE
"Chief Complaint   Patient presents with     Hypertension       Initial BP (!) 144/66 (BP Location: Left arm, Patient Position: Sitting, Cuff Size: Adult Regular)   Pulse 88   Temp 97.1  F (36.2  C) (Tympanic)   Resp 16   Ht 1.715 m (5' 7.5\")   Wt 74.3 kg (163 lb 12.8 oz)   SpO2 96%   BMI 25.28 kg/m   Estimated body mass index is 25.28 kg/m  as calculated from the following:    Height as of this encounter: 1.715 m (5' 7.5\").    Weight as of this encounter: 74.3 kg (163 lb 12.8 oz).  Medication Reconciliation: complete  Arianna Muller MA  "

## 2020-08-07 ASSESSMENT — ANXIETY QUESTIONNAIRES: GAD7 TOTAL SCORE: 1

## 2020-10-15 ENCOUNTER — OFFICE VISIT (OUTPATIENT)
Dept: FAMILY MEDICINE | Facility: OTHER | Age: 75
End: 2020-10-15
Attending: NURSE PRACTITIONER
Payer: COMMERCIAL

## 2020-10-15 VITALS
DIASTOLIC BLOOD PRESSURE: 72 MMHG | TEMPERATURE: 97.3 F | OXYGEN SATURATION: 98 % | WEIGHT: 162 LBS | BODY MASS INDEX: 25 KG/M2 | HEART RATE: 70 BPM | SYSTOLIC BLOOD PRESSURE: 154 MMHG

## 2020-10-15 DIAGNOSIS — F41.9 ANXIETY: ICD-10-CM

## 2020-10-15 DIAGNOSIS — I10 BENIGN ESSENTIAL HYPERTENSION: Primary | ICD-10-CM

## 2020-10-15 LAB — TSH SERPL DL<=0.005 MIU/L-ACNC: 2.07 MU/L (ref 0.4–4)

## 2020-10-15 PROCEDURE — 36415 COLL VENOUS BLD VENIPUNCTURE: CPT | Mod: ZL | Performed by: NURSE PRACTITIONER

## 2020-10-15 PROCEDURE — 93010 ELECTROCARDIOGRAM REPORT: CPT | Performed by: INTERNAL MEDICINE

## 2020-10-15 PROCEDURE — G0463 HOSPITAL OUTPT CLINIC VISIT: HCPCS | Mod: 25

## 2020-10-15 PROCEDURE — 93005 ELECTROCARDIOGRAM TRACING: CPT

## 2020-10-15 PROCEDURE — 99214 OFFICE O/P EST MOD 30 MIN: CPT | Performed by: NURSE PRACTITIONER

## 2020-10-15 PROCEDURE — 84443 ASSAY THYROID STIM HORMONE: CPT | Mod: ZL | Performed by: NURSE PRACTITIONER

## 2020-10-15 RX ORDER — METOPROLOL SUCCINATE 25 MG/1
25 TABLET, EXTENDED RELEASE ORAL DAILY
Qty: 30 TABLET | Refills: 0 | Status: SHIPPED | OUTPATIENT
Start: 2020-10-15 | End: 2020-11-09

## 2020-10-15 ASSESSMENT — ANXIETY QUESTIONNAIRES
4. TROUBLE RELAXING: MORE THAN HALF THE DAYS
3. WORRYING TOO MUCH ABOUT DIFFERENT THINGS: MORE THAN HALF THE DAYS
IF YOU CHECKED OFF ANY PROBLEMS ON THIS QUESTIONNAIRE, HOW DIFFICULT HAVE THESE PROBLEMS MADE IT FOR YOU TO DO YOUR WORK, TAKE CARE OF THINGS AT HOME, OR GET ALONG WITH OTHER PEOPLE: VERY DIFFICULT
1. FEELING NERVOUS, ANXIOUS, OR ON EDGE: SEVERAL DAYS
2. NOT BEING ABLE TO STOP OR CONTROL WORRYING: NOT AT ALL
6. BECOMING EASILY ANNOYED OR IRRITABLE: MORE THAN HALF THE DAYS
7. FEELING AFRAID AS IF SOMETHING AWFUL MIGHT HAPPEN: NOT AT ALL
GAD7 TOTAL SCORE: 9
5. BEING SO RESTLESS THAT IT IS HARD TO SIT STILL: MORE THAN HALF THE DAYS

## 2020-10-15 ASSESSMENT — PAIN SCALES - GENERAL: PAINLEVEL: NO PAIN (0)

## 2020-10-15 ASSESSMENT — PATIENT HEALTH QUESTIONNAIRE - PHQ9: SUM OF ALL RESPONSES TO PHQ QUESTIONS 1-9: 9

## 2020-10-15 NOTE — PROGRESS NOTES
Subjective     Ten Flores is a 75 year old male who presents to clinic today for the following health issues:    HPI         Hypertension Follow-up      Do you check your blood pressure regularly outside of the clinic? No     Are you following a low salt diet? Yes    Are your blood pressures ever more than 140 on the top number (systolic) OR more   than 90 on the bottom number (diastolic), for example 140/90? No      How many servings of fruits and vegetables do you eat daily?  2-3    On average, how many sweetened beverages do you drink each day (Examples: soda, juice, sweet tea, etc.  Do NOT count diet or artificially sweetened beverages)?   0    How many days per week do you exercise enough to make your heart beat faster? 5    How many minutes a day do you exercise enough to make your heart beat faster? 20 - 29    How many days per week do you miss taking your medication? 0        Abnormal Mood Symptoms  Onset/Duration: since September   Description: chest tightness increased anxiety  Depression (if yes, do PHQ-9): YES since brother  on 2020.   Anxiety (if yes, do NAVYA-7): YES  Accompanying Signs & Symptoms:  Still participating in activities that you used to enjoy: no  Fatigue: no  Irritability: YES  Difficulty concentrating: YES  Changes in appetite: YES  Problems with sleep: YES  Heart racing/beating fast: YES  Abnormally elevated, expansive, or irritable mood: YES  Persistently increased activity or energy: no  Thoughts of hurting yourself or others: no  History:  Recent stress or major life event: YES- borther  unexpectedly  Prior depression or anxiety: None  Family history of depression or anxiety: no  Alcohol/drug use: no  Difficulty sleeping: YES  Precipitating or alleviating factors: None  Therapies tried and outcome: none    Generally has 2 beers a day but has been having 3 lately.     PHQ 2020 2020 10/15/2020   PHQ-9 Total Score 0 0 9   Q9: Thoughts of better off dead/self-harm  past 2 weeks Not at all Not at all Not at all     NAVYA-7 SCORE 1/30/2020 8/6/2020 10/15/2020   Total Score 0 1 9       Review of Systems   Constitutional, HEENT, cardiovascular, pulmonary, gi and gu systems are negative, except as otherwise noted.      Objective    BP (!) 154/72 (BP Location: Right arm, Patient Position: Sitting, Cuff Size: Adult Regular)   Pulse 70   Temp 97.3  F (36.3  C) (Tympanic)   Wt 73.5 kg (162 lb)   SpO2 98%   BMI 25.00 kg/m    Body mass index is 25 kg/m .     Physical Exam   GENERAL: healthy, alert and no distress  EYES: Eyes grossly normal to inspection, PERRL and conjunctivae and sclerae normal  NECK: no adenopathy, no asymmetry, masses, or scars and thyroid normal to palpation  RESP: lungs clear to auscultation - no rales, rhonchi or wheezes  CV: regular rate and rhythm, normal S1 S2, no S3 or S4, no murmur, click or rub, no peripheral edema and peripheral pulses strong  PSYCH: mentation appears normal, tearful, anxious, judgement and insight intact and appearance well groomed    EKG - appears normal, NSR, normal axis, normal intervals, no acute ST/T changes c/w ischemia, no LVH by voltage criteria, unchanged from previous tracings    Results for orders placed or performed in visit on 10/15/20   TSH with free T4 reflex     Status: None   Result Value Ref Range    TSH 2.07 0.40 - 4.00 mU/L             Assessment & Plan   1. Benign essential hypertension  - EKG 12-lead complete w/read - (Clinic Performed)  - TSH with free T4 reflex  - metoprolol succinate ER (TOPROL-XL) 25 MG 24 hr tablet; Take 1 tablet (25 mg) by mouth daily  Dispense: 30 tablet; Refill: 0    2. Anxiety  - EKG 12-lead complete w/read - (Clinic Performed)  - TSH with free T4 reflex  - metoprolol succinate ER (TOPROL-XL) 25 MG 24 hr tablet; Take 1 tablet (25 mg) by mouth daily  Dispense: 30 tablet; Refill: 0       BMI:   Estimated body mass index is 25 kg/m  as calculated from the following:    Height as of 8/6/20:  "1.715 m (5' 7.5\").    Weight as of this encounter: 73.5 kg (162 lb).       Return in about 1 week (around 10/22/2020), or with PCP, for BP Recheck, Treatment Follow-up.   Appointment on 10/22/2020 is scheduled    Cassie Spence, [facility]    "

## 2020-10-15 NOTE — PATIENT INSTRUCTIONS
Patient Education     Helping Yourself Through Grief and Loss  Do what you can to stay healthy. Reaching out for support will help a great deal. You may find yourself asking:  Why?  It s normal to seek meaning by asking questions, but there s not always an answer for loss. With time, your loss may still be part of your life, but not the only thing in it.    Take care of yourself  Taking good care of yourself helps your body heal from the physical and emotional symptoms of grief. Pay extra attention to healthy exercise, sleep, and eating routines. What else do you need to feel better? Having family around can help you feel loved. Or you might need a walk or movie with friends to take your mind off things for a little while.  Accept support  Joining the world again is part of healing. These tips may help:    Stay in touch with family and friends, even if it s hard to talk.    Tell people how they can help. It can be as simple as bringing you a meal or walking your dog.    Stick to a daily routine that keeps you connected to friends and family.    Try to avoid making major decisions.    Attend a support group of people who have been through the same type of loss.  When to get help  There's no normal length of time to grieve. But if you feel stuck and unable to move on, or if it has been 6 months or more since your loss and you still have signs of grief listed below, it may be time for professional help. Seeking professional help is not a sign of weakness. It means that you are taking responsibility for your recovery. Be alert to depression and call your healthcare provider if you:    Can t go to work or take care of the kids.    Can t eat or sleep normally.    Feel helpless, hopeless, or worthless.    Lose interest in hobbies, friends, and activities that used to give pleasure.    Gain or lose a lot of weight.    Feel your grief is getting worse, or not getting any better.    Have repeated thoughts of suicide or of  harming yourself. Call 911 or a crisis hotline (you can find one online) if you have these thoughts.  At some point, you ll begin thinking about the future. You ll want to look ahead and make plans. To help yourself reach this point, try to do one thing each day to join in life. Keep at it, even if it feels strange at first. Your life can never be exactly the same. But one day you ll find you re living life fully again.  Date Last Reviewed: 2/1/2018 2000-2019 [employer]. [address] [place] [address]. All rights reserved. This information is not intended as a substitute for professional medical care. Always follow your healthcare professional's instructions.           Patient Education     Taking a Beta-Blocker  Your beta-blocker decreases your heart s workload and helps it beat more regularly. It can also help increase your heart's pumping ability. This can reduce chest pain. It may also lower your blood pressure. This medicine is sometimes used to treat other medical problems.    The name of my beta-blocker is: _____________________  I m taking it for: _________________________    Medicine tips    Read the fact sheet that comes with your medicine. Ask for a sheet if you don t get one.    Take your beta blocker exactly as directed. Follow the directions on the label.    Take your medicine at the same time or times every day.    If you take a long-acting tablet or capsule, swallow it whole. Do not chew it, crush it, or break it open unless you are told it is OK to do so.    If you miss a dose, take it as soon as you remember -- unless it s almost time for your next dose. If so, skip the missed dose. Do not take a double dose.  For your safety    Tell any healthcare provider you have (including your pharmacist) before taking any other prescription or over-the-counter medicines. This includes vitamin supplements and herbal remedies.    Do not stop taking your beta-blocker unless your  healthcare provider tells you to. Doing so can make your condition worse. When it is time to stop, follow your healthcare provider s instructions.    Do not drive or operate heavy machinery unless you are sure your beta-blocker doesn t make you sleepy or dizzy.    To prevent dizziness, get up slowly after sitting or lying down.    Ask your healthcare provider to check your pulse rate before and after taking your beta blocker.  Possible side effects  Along with their intended effects, medicine can have unwanted side effects. These effects may go away as your body adjusts to the medicine. Possible side effects of beta blockers include:    Dizziness or lightheadedness    Tiredness, drowsiness, or weakness    Sexual dysfunction    Trouble sleeping    Mood changes or depression    Fainting    Slow heartbeat  Talk to your healthcare provider if you have side effects that bother you or don t go away.  When to seek medical advice  Call your healthcare provider right away if you have any of these:    Wheezing or trouble breathing    Fainting    Chest pain or a slow or irregular heartbeat    Swelling in your lower legs or feet    Yellowing skin or eyes    Numbness or tingling    Skin rash or itching      NOTE: This medicine can cause potentially dangerous side effects if you stop taking it too quickly. Take this medicine only as directed. Do not stop taking this medicine without talking to your healthcare provider first. When told you can stop taking this medicine, follow all instructions you are given carefully. Talk with your healthcare provider about anything you don t understand or to learn more about your medicine.  Date Last Reviewed: 6/1/2016 2000-2019 [employer]. [address] [place] [address], [place] [address]. All rights reserved. This information is not intended as a substitute for professional medical care. Always follow your healthcare professional's instructions.           Patient Education      Controlling High Blood Pressure  High blood pressure (hypertension) is often called the silent killer. This is because many people who have it, don t know it. High blood pressure can raise your risk of heart attack, stroke, heart disease, and heart failure. Controlling your blood pressure can decrease your risk of these problems. Know your blood pressure and remember to check it regularly. Doing so can save your life.  Blood pressure measurements are given as 2 numbers. Systolic blood pressure is the upper number. This is the pressure when the heart contracts. Diastolic blood pressure is the lower number. This is the pressure when the heart relaxes between beats.  Blood pressure is categorized as normal, elevated, or stage 1 or stage 2 high blood pressure:    Normal blood pressure is systolic of less than 120 and diastolic of less than 80 (120/80)    Elevated blood pressure is systolic of 120 to 129 and diastolic less than 80    Stage 1 high blood pressure is systolic is 130 to 139 or diastolic between 80 to 89    Stage 2 high blood pressure is when systolic is 140 or higher or the diastolic is 90 or higher  Here are some things you can do to help control your blood pressure.    Choose heart-healthy foods    Select low-salt, low-fat foods. Limit sodium intake to 2,400 mg per day or the amount suggested by your healthcare provider.    Limit canned, dried, cured, packaged, and fast foods. These can contain a lot of salt.    Eat 8 to 10 servings of fruits and vegetables every day.    Choose lean meats, fish, or chicken.    Eat whole-grain pasta, brown rice, and beans.    Eat 2 to 3 servings of low-fat or fat-free dairy products.    Ask your doctor about the DASH eating plan. This plan helps reduce blood pressure.    When you go to a restaurant, ask that your meal be prepared with no added salt.    Maintain a healthy weight    Ask your healthcare provider how many calories to eat a day. Then stick to that  number.    Ask your healthcare provider what weight range is healthiest for you. If you are overweight, a weight loss of only 3% to 5% of your body weight can help lower blood pressure. Generally, a good weight loss goal is to lose 10% of your body weight in a year.    Limit snacks and sweets.    Get regular exercise.    Get up and get active    Choose activities you enjoy. Find ones you can do with friends or family. This includes bicycling, dancing, walking, and jogging.    Park farther away from building entrances.    Use stairs instead of the elevator.    When you can, walk or bike instead of driving.    Riner leaves, garden, or do household repairs.    Be active at a moderate to vigorous level of physical activity for at least 40 minutes for a minimum of 3 to 4 days a week.     Manage stress    Make time to relax and enjoy life. Find time to laugh.    Communicate your concerns with your loved ones and your healthcare provider.    Visit with family and friends, and keep up with hobbies.    Limit alcohol and quit smoking    Men should have no more than 2 drinks per day.    Women should have no more than 1 drink per day.    Talk with your healthcare provider about quitting smoking. Smoking significantly increases your risk for heart disease and stroke. Ask your healthcare provider about community smoking cessation programs and other options.    Medicines  If lifestyle changes aren t enough, your healthcare provider may prescribe high blood pressure medicine. Take all medicines as prescribed. If you have any questions about your medicines, ask your healthcare provider before stopping or changing them.  Date Last Reviewed: 4/27/2016 2000-2019 [employer] [address] [facility], [place] [address]. All rights reserved. This information is not intended as a substitute for professional medical care. Always follow your healthcare professional's instructions.

## 2020-10-15 NOTE — NURSING NOTE
"Chief Complaint   Patient presents with     Hypertension       Initial BP (!) 182/100 (BP Location: Right arm, Patient Position: Chair, Cuff Size: Adult Regular)   Pulse 70   Temp 97.3  F (36.3  C) (Tympanic)   Wt 73.5 kg (162 lb)   SpO2 98%   BMI 25.00 kg/m   Estimated body mass index is 25 kg/m  as calculated from the following:    Height as of 8/6/20: 1.715 m (5' 7.5\").    Weight as of this encounter: 73.5 kg (162 lb).  Medication Reconciliation: complete  Neris Gaitan LPN    "

## 2020-10-16 ASSESSMENT — ANXIETY QUESTIONNAIRES: GAD7 TOTAL SCORE: 9

## 2020-10-16 NOTE — PROGRESS NOTES
Subjective     Ten Flores is a 75 year old male who presents to clinic today for the following health issues:    HPI         Hypertension Follow-up      Do you check your blood pressure regularly outside of the clinic? Yes     Are you following a low salt diet? Yes    Are your blood pressures ever more than 140 on the top number (systolic) OR more   than 90 on the bottom number (diastolic), for example 140/90? Yes     Patient feels he is doing well with new medication.  He is happy to see that his BP has improved.  He states he is less anxious and notes his sleep has improved.  He does note his brother passed away recently unexpectedly.  He feels he is dealing with this loss appropriately.      How many servings of fruits and vegetables do you eat daily?  0-1    On average, how many sweetened beverages do you drink each day (Examples: soda, juice, sweet tea, etc.  Do NOT count diet or artificially sweetened beverages)?   1    How many days per week do you exercise enough to make your heart beat faster? 7    How many minutes a day do you exercise enough to make your heart beat faster? 30 - 60    How many days per week do you miss taking your medication? 0      Patient Active Problem List   Diagnosis     Advanced care planning/counseling discussion     Benign essential hypertension     Hypercholesterolemia     Hip pain, right     Chronic rhinitis     h/o Chewing tobacco nicotine dependence     Past Surgical History:   Procedure Laterality Date     DERMATOLOGY IP CONSULT       GENITOURINARY SURGERY      circ     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         Social History     Tobacco Use     Smoking status: Former Smoker     Packs/day: 0.50     Years: 5.00     Pack years: 2.50     Types: Cigarettes     Quit date: 1965     Years since quittin.8     Smokeless tobacco: Former User     Tobacco comment: Tried to Quit (YES)   Substance Use Topics     Alcohol use: Yes     Alcohol/week: 0.0  "standard drinks     Comment: DAILY,wine,beer     Family History   Problem Relation Age of Onset     Heart Disease Mother 68        Disease - Cause of Death     Heart Disease Father 89        Disease - Cause of Death     Cancer Brother      Thrombosis Brother            Current Outpatient Medications   Medication Sig Dispense Refill     acetaminophen (TYLENOL) 500 MG tablet Take 2 tablets (1,000 mg) by mouth every 8 hours as needed 100 tablet 1     aspirin 81 MG tablet Take by mouth daily       budesonide (RINOCORT AQUA) 32 MCG/ACT nasal spray Spray 2 sprays into both nostrils daily 1 Bottle 11     fish oil-omega-3 fatty acids (FISH OIL) 1000 MG capsule Take 2 g by mouth daily        meloxicam (MOBIC) 7.5 MG tablet TAKE 1 TABLET BY MOUTH EVERY DAY 90 tablet 1     metoprolol succinate ER (TOPROL-XL) 25 MG 24 hr tablet Take 1 tablet (25 mg) by mouth daily 30 tablet 0     simvastatin (ZOCOR) 40 MG tablet Take 1 tablet (40 mg) by mouth At Bedtime 90 tablet 1     acetaminophen-codeine (TYLENOL #3) 300-30 MG tablet Take 1 tablet by mouth every 6 hours as needed for severe pain (Patient not taking: Reported on 10/22/2020) 10 tablet 0     tiZANidine (ZANAFLEX) 4 MG tablet Take 1 tablet (4 mg) by mouth 3 times daily as needed for muscle spasms (Patient not taking: Reported on 10/22/2020) 30 tablet 1     Allergies   Allergen Reactions     Sulfasuccinamide Sodium Hives     Sulfa Drugs Rash       Family History, Social History, Tobacco Use are all reviewed and updated      Review of Systems   Constitutional, HEENT, cardiovascular, pulmonary, gi and gu systems are negative, except as otherwise noted.      Objective    /78   Pulse 65   Temp 98.6  F (37  C) (Tympanic)   Ht 1.676 m (5' 6\")   Wt 75.5 kg (166 lb 6.4 oz)   SpO2 97%   BMI 26.86 kg/m    Body mass index is 26.86 kg/m .  Physical Exam   GENERAL: healthy, alert and no distress  PSYCH: mentation appears normal, affect normal/bright          Assessment & Plan "     1. Benign essential hypertension  Continue current medication.    2. Anxiety  No changes recommended.  Condolences given.         FUTURE APPOINTMENTS:       - Follow-up visit in one month for medication review and BP recheck.    No follow-ups on file.    Hodan Tapia MD  [facility]

## 2020-10-22 ENCOUNTER — OFFICE VISIT (OUTPATIENT)
Dept: FAMILY MEDICINE | Facility: OTHER | Age: 75
End: 2020-10-22
Attending: FAMILY MEDICINE
Payer: COMMERCIAL

## 2020-10-22 VITALS
TEMPERATURE: 98.6 F | SYSTOLIC BLOOD PRESSURE: 132 MMHG | HEIGHT: 66 IN | DIASTOLIC BLOOD PRESSURE: 78 MMHG | OXYGEN SATURATION: 97 % | BODY MASS INDEX: 26.74 KG/M2 | WEIGHT: 166.4 LBS | HEART RATE: 65 BPM

## 2020-10-22 DIAGNOSIS — F41.9 ANXIETY: ICD-10-CM

## 2020-10-22 DIAGNOSIS — I10 BENIGN ESSENTIAL HYPERTENSION: Primary | ICD-10-CM

## 2020-10-22 PROCEDURE — G0463 HOSPITAL OUTPT CLINIC VISIT: HCPCS

## 2020-10-22 PROCEDURE — 99213 OFFICE O/P EST LOW 20 MIN: CPT | Performed by: FAMILY MEDICINE

## 2020-10-22 ASSESSMENT — PAIN SCALES - GENERAL: PAINLEVEL: NO PAIN (0)

## 2020-10-22 ASSESSMENT — MIFFLIN-ST. JEOR: SCORE: 1427.54

## 2020-10-22 NOTE — NURSING NOTE
"Chief Complaint   Patient presents with     Hypertension       Initial /78   Pulse 65   Temp 98.6  F (37  C) (Tympanic)   Ht 1.676 m (5' 6\")   Wt 75.5 kg (166 lb 6.4 oz)   SpO2 97%   BMI 26.86 kg/m   Estimated body mass index is 26.86 kg/m  as calculated from the following:    Height as of this encounter: 1.676 m (5' 6\").    Weight as of this encounter: 75.5 kg (166 lb 6.4 oz).  Medication Reconciliation: complete  Rowan Escamilla LPN    "

## 2020-11-07 DIAGNOSIS — F41.9 ANXIETY: ICD-10-CM

## 2020-11-07 DIAGNOSIS — I10 BENIGN ESSENTIAL HYPERTENSION: ICD-10-CM

## 2020-11-09 DIAGNOSIS — M79.671 RIGHT FOOT PAIN: ICD-10-CM

## 2020-11-09 RX ORDER — METOPROLOL SUCCINATE 25 MG/1
TABLET, EXTENDED RELEASE ORAL
Qty: 30 TABLET | Refills: 2 | Status: SHIPPED | OUTPATIENT
Start: 2020-11-09 | End: 2021-02-03

## 2020-11-09 NOTE — TELEPHONE ENCOUNTER
metoprolol      Last Written Prescription Date:  10/15/20  Last Fill Quantity: 30,   # refills: 0  Last Office Visit: 10/2/20  Future Office visit:    Next 5 appointments (look out 90 days)    Nov 23, 2020 10:00 AM  (Arrive by 9:45 AM)  SHORT with Hodan Tapia MD  [facility] ([facility] ) [address]  [facility] [address]  [phone]

## 2020-11-10 RX ORDER — MELOXICAM 7.5 MG/1
TABLET ORAL
Qty: 90 TABLET | Refills: 1 | Status: SHIPPED | OUTPATIENT
Start: 2020-11-10 | End: 2021-07-15

## 2020-11-10 NOTE — TELEPHONE ENCOUNTER
Meera      Last Written Prescription Date:  06/25/20  Last Fill Quantity: 90,   # refills: 1  Last Office Visit: 10/22/20  Future Office visit:    Next 5 appointments (look out 90 days)    Nov 23, 2020 10:00 AM  (Arrive by 9:45 AM)  SHORT with Hodan Tapia MD  [facility] ([facility] ) [address]  [place] [address]  [phone]   Feb 08, 2021  8:30 AM  (Arrive by 8:15 AM)  SHORT with Hodan Tapia MD  [facility] ([facility] ) [address]  [place] [address]  [phone]

## 2020-11-11 NOTE — PROGRESS NOTES
Subjective     Ten Flores is a 75 year old male who presents to clinic today for the following health issues:    HPI         Hyperlipidemia Follow-Up      Are you regularly taking any medication or supplement to lower your cholesterol?   Yes- simvastatin    Are you having muscle aches or other side effects that you think could be caused by your cholesterol lowering medication?  No    Hypertension Follow-up      Do you check your blood pressure regularly outside of the clinic? Yes     Are you following a low salt diet? No    Are your blood pressures ever more than 140 on the top number (systolic) OR more   than 90 on the bottom number (diastolic), for example 140/90? No   Patient's readings at home have generally been normal, 130s systolic, but today was a little elevated due to shoulder pain.      How many servings of fruits and vegetables do you eat daily?  0-1    On average, how many sweetened beverages do you drink each day (Examples: soda, juice, sweet tea, etc.  Do NOT count diet or artificially sweetened beverages)?   1    How many days per week do you exercise enough to make your heart beat faster? 7    How many minutes a day do you exercise enough to make your heart beat faster? 60 or more    How many days per week do you miss taking your medication? 0    Patient is due for colon cancer screening, he is willing to complete Cologuard      Patient Active Problem List   Diagnosis     Advanced care planning/counseling discussion     Benign essential hypertension     Hypercholesterolemia     Hip pain, right     Chronic rhinitis     h/o Chewing tobacco nicotine dependence     Past Surgical History:   Procedure Laterality Date     DERMATOLOGY IP CONSULT       GENITOURINARY SURGERY      circ     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         Social History     Tobacco Use     Smoking status: Former Smoker     Packs/day: 0.50     Years: 5.00     Pack years: 2.50     Types: Cigarettes      "Quit date: 1965     Years since quittin.9     Smokeless tobacco: Former User     Tobacco comment: Tried to Quit (YES)   Substance Use Topics     Alcohol use: Yes     Alcohol/week: 0.0 standard drinks     Comment: DAILY,wine,beer     Family History   Problem Relation Age of Onset     Heart Disease Mother 68        Disease - Cause of Death     Heart Disease Father 89        Disease - Cause of Death     Cancer Brother      Thrombosis Brother            Current Outpatient Medications   Medication Sig Dispense Refill     acetaminophen (TYLENOL) 500 MG tablet Take 2 tablets (1,000 mg) by mouth every 8 hours as needed 100 tablet 1     aspirin 81 MG tablet Take by mouth daily       budesonide (RINOCORT AQUA) 32 MCG/ACT nasal spray Spray 2 sprays into both nostrils daily 1 Bottle 11     fish oil-omega-3 fatty acids (FISH OIL) 1000 MG capsule Take 2 g by mouth daily        meloxicam (MOBIC) 7.5 MG tablet TAKE 1 TABLET BY MOUTH EVERY DAY 90 tablet 1     metoprolol succinate ER (TOPROL-XL) 25 MG 24 hr tablet TAKE 1 TABLET BY MOUTH EVERY DAY 30 tablet 2     simvastatin (ZOCOR) 40 MG tablet Take 1 tablet (40 mg) by mouth At Bedtime 90 tablet 1     Allergies   Allergen Reactions     Sulfasuccinamide Sodium Hives     Sulfa Drugs Rash       Family History, Social History, Tobacco Use are all reviewed and updated      Review of Systems   Constitutional, HEENT, cardiovascular, pulmonary, gi and gu systems are negative, except as otherwise noted.      Objective    BP (!) 158/64 (BP Location: Right arm, Patient Position: Chair, Cuff Size: Adult Regular)   Pulse 63   Temp 97.9  F (36.6  C) (Temporal)   Ht 1.702 m (5' 7\")   Wt 75.8 kg (167 lb)   SpO2 98%   BMI 26.16 kg/m    Body mass index is 26.16 kg/m .  Physical Exam   GENERAL: healthy, alert and no distress  PSYCH: mentation appears normal, affect normal/bright          Assessment & Plan     1. Hypercholesterolemia  No changes at this time, follow-up in Feburary    2. " Benign essential hypertension  As above    3. Colon cancer screening  Cologuard form given           Return in about 3 months (around 2/23/2021) for Chronic Disease Management, Medication review.    Hodan Tapia MD  [facility]

## 2020-11-23 ENCOUNTER — OFFICE VISIT (OUTPATIENT)
Dept: FAMILY MEDICINE | Facility: OTHER | Age: 75
End: 2020-11-23
Attending: FAMILY MEDICINE
Payer: COMMERCIAL

## 2020-11-23 VITALS
TEMPERATURE: 97.9 F | WEIGHT: 167 LBS | SYSTOLIC BLOOD PRESSURE: 158 MMHG | DIASTOLIC BLOOD PRESSURE: 64 MMHG | HEART RATE: 63 BPM | HEIGHT: 67 IN | OXYGEN SATURATION: 98 % | BODY MASS INDEX: 26.21 KG/M2

## 2020-11-23 DIAGNOSIS — E78.00 HYPERCHOLESTEROLEMIA: Primary | ICD-10-CM

## 2020-11-23 DIAGNOSIS — Z12.11 COLON CANCER SCREENING: ICD-10-CM

## 2020-11-23 DIAGNOSIS — I10 BENIGN ESSENTIAL HYPERTENSION: ICD-10-CM

## 2020-11-23 PROCEDURE — 99213 OFFICE O/P EST LOW 20 MIN: CPT | Performed by: FAMILY MEDICINE

## 2020-11-23 PROCEDURE — G0463 HOSPITAL OUTPT CLINIC VISIT: HCPCS

## 2020-11-23 ASSESSMENT — MIFFLIN-ST. JEOR: SCORE: 1446.14

## 2020-11-23 ASSESSMENT — PAIN SCALES - GENERAL: PAINLEVEL: MODERATE PAIN (5)

## 2020-11-23 NOTE — NURSING NOTE
"Chief Complaint   Patient presents with     Hypertension     Lipids       Initial BP (!) 158/64 (BP Location: Right arm, Patient Position: Chair, Cuff Size: Adult Regular)   Pulse 63   Temp 97.9  F (36.6  C) (Temporal)   Ht 1.702 m (5' 7\")   Wt 75.8 kg (167 lb)   SpO2 98%   BMI 26.16 kg/m   Estimated body mass index is 26.16 kg/m  as calculated from the following:    Height as of this encounter: 1.702 m (5' 7\").    Weight as of this encounter: 75.8 kg (167 lb).  Medication Reconciliation: complete  Neris Gaitan LPN    "

## 2020-11-23 NOTE — Clinical Note
Patient has appointment scheduled for Feburary, please add bilateral shoulder injections to reason for appointment

## 2020-12-02 ENCOUNTER — TRANSFERRED RECORDS (OUTPATIENT)
Dept: HEALTH INFORMATION MANAGEMENT | Facility: HOSPITAL | Age: 75
End: 2020-12-02

## 2020-12-02 LAB — COLOGUARD-ABSTRACT: NEGATIVE

## 2021-02-03 DIAGNOSIS — F41.9 ANXIETY: ICD-10-CM

## 2021-02-03 DIAGNOSIS — I10 BENIGN ESSENTIAL HYPERTENSION: ICD-10-CM

## 2021-02-03 DIAGNOSIS — E78.00 HYPERCHOLESTEROLEMIA: ICD-10-CM

## 2021-02-03 LAB
ALT SERPL W P-5'-P-CCNC: 36 U/L (ref 0–70)
ANION GAP SERPL CALCULATED.3IONS-SCNC: 6 MMOL/L (ref 3–14)
BUN SERPL-MCNC: 21 MG/DL (ref 7–30)
CALCIUM SERPL-MCNC: 9 MG/DL (ref 8.5–10.1)
CHLORIDE SERPL-SCNC: 105 MMOL/L (ref 94–109)
CHOLEST SERPL-MCNC: 145 MG/DL
CO2 SERPL-SCNC: 27 MMOL/L (ref 20–32)
CREAT SERPL-MCNC: 1.29 MG/DL (ref 0.66–1.25)
GFR SERPL CREATININE-BSD FRML MDRD: 53 ML/MIN/{1.73_M2}
GLUCOSE SERPL-MCNC: 137 MG/DL (ref 70–99)
HDLC SERPL-MCNC: 39 MG/DL
LDLC SERPL CALC-MCNC: 51 MG/DL
NONHDLC SERPL-MCNC: 106 MG/DL
POTASSIUM SERPL-SCNC: 4.2 MMOL/L (ref 3.4–5.3)
SODIUM SERPL-SCNC: 138 MMOL/L (ref 133–144)
TRIGL SERPL-MCNC: 276 MG/DL

## 2021-02-03 PROCEDURE — 80048 BASIC METABOLIC PNL TOTAL CA: CPT | Mod: ZL | Performed by: FAMILY MEDICINE

## 2021-02-03 PROCEDURE — 84460 ALANINE AMINO (ALT) (SGPT): CPT | Mod: ZL | Performed by: FAMILY MEDICINE

## 2021-02-03 PROCEDURE — 36415 COLL VENOUS BLD VENIPUNCTURE: CPT | Mod: ZL | Performed by: FAMILY MEDICINE

## 2021-02-03 PROCEDURE — 80061 LIPID PANEL: CPT | Mod: ZL | Performed by: FAMILY MEDICINE

## 2021-02-03 RX ORDER — METOPROLOL SUCCINATE 25 MG/1
TABLET, EXTENDED RELEASE ORAL
Qty: 90 TABLET | Refills: 1 | Status: SHIPPED | OUTPATIENT
Start: 2021-02-03 | End: 2021-07-25

## 2021-02-03 NOTE — TELEPHONE ENCOUNTER
metoprolol succinate ER (TOPROL-XL) 25 MG 24 hr tablet      Last Written Prescription Date:  11/09/20  Last Fill Quantity: 30,   # refills: 2  Last Office Visit: 11/23/20  Future Office visit:    Next 5 appointments (look out 90 days)    Feb 08, 2021  8:30 AM  (Arrive by 8:15 AM)  SHORT with Hodan Tapia MD  [facility] ([facility] ) [address]  [facility] [address]  [phone]           Routing refill request to provider for review/approval because:  Blood pressure out of range   Blood pressure under 140/90 in past 12 months        BP Readings from Last 3 Encounters:   11/23/20 (!) 158/64   10/22/20 132/78   10/15/20 (!) 154/72

## 2021-02-05 NOTE — PROGRESS NOTES
Assessment & Plan     1. Hypercholesterolemia  Labs reviewed (patient was not fasting), no medication changes at this time.  Current medication renewed.  Follow-up in six months, sooner as needed.  - simvastatin (ZOCOR) 40 MG tablet; Take 1 tablet (40 mg) by mouth At Bedtime  Dispense: 90 tablet; Refill: 3    2. Benign essential hypertension  No changes at this time.    3. Chronic rhinitis  Refilled  - budesonide (RINOCORT AQUA) 32 MCG/ACT nasal spray; Spray 2 sprays into both nostrils daily  Dispense: 8.6 g; Refill: 2    4. Arthritis of shoulder  Patient tolerated injections well, monitor for signs of infection.  Follow-up as needed.  - triamcinolone (KENALOG-40) injection 80 mg  - lidocaine 1 % injection 8 mL  - Large Joint/Bursa injection and/or drainage (Shoulder, Knee)      Return in about 6 months (around 8/8/2021) for Medication review.    Hodan Tapia MD  [facility] - ROSALINDA Caal is a 75 year old who presents to clinic today for the following health issues     HPI       Hyperlipidemia Follow-Up      Are you regularly taking any medication or supplement to lower your cholesterol?   Yes- simvastatin    Are you having muscle aches or other side effects that you think could be caused by your cholesterol lowering medication?  No    Hypertension Follow-up      Do you check your blood pressure regularly outside of the clinic? Yes     Are you following a low salt diet? Yes    Are your blood pressures ever more than 140 on the top number (systolic) OR more   than 90 on the bottom number (diastolic), for example 140/90? Yes      How many servings of fruits and vegetables do you eat daily?  0-1    On average, how many sweetened beverages do you drink each day (Examples: soda, juice, sweet tea, etc.  Do NOT count diet or artificially sweetened beverages)?   2    How many days per week do you exercise enough to make your heart beat faster? 7    How many minutes a day do you  exercise enough to make your heart beat faster? 10 - 19    How many days per week do you miss taking your medication? 0    Musculoskeletal problem/pain  Onset/Duration: years  Description  Location: shoulder - bilateral  Joint Swelling: no  Redness: no  Pain: YES  Warmth: no  Intensity:  moderate  Progression of Symptoms:  worsening  Accompanying signs and symptoms:   Fevers: no  Numbness/tingling/weakness: no  History  Trauma to the area: no  Recent illness:  no  Previous similar problem: YES  Previous evaluation:  YES  Precipitating or alleviating factors:  Aggravating factors include: overuse  Therapies tried and outcome: heat, ice and exercises  Patient had an injection in the left shoulder over a year ago.  He states he probably could have used in injection starting in the fall, but he held off.  He would like injections in both shoulders.      Review of Systems   Constitutional, HEENT, cardiovascular, pulmonary, gi and gu systems are negative, except as otherwise noted.      Objective    BP (!) 140/78   Pulse 60   Temp 97.6  F (36.4  C)   Wt 75.2 kg (165 lb 12.8 oz)   SpO2 98%   BMI 25.97 kg/m    Body mass index is 25.97 kg/m .  Physical Exam   GENERAL: healthy, alert and no distress  PSYCH: mentation appears normal, affect normal/bright    Orders Only on 02/03/2021   Component Date Value Ref Range Status     Cholesterol 02/03/2021 145  <200 mg/dL Final     Triglycerides 02/03/2021 276* <150 mg/dL Final    Comment: Borderline high:  150-199 mg/dl  High:             200-499 mg/dl  Very high:       >499 mg/dl  Non Fasting       HDL Cholesterol 02/03/2021 39* >39 mg/dL Final     LDL Cholesterol Calculated 02/03/2021 51  <100 mg/dL Final    Desirable:       <100 mg/dl     Non HDL Cholesterol 02/03/2021 106  <130 mg/dL Final     ALT 02/03/2021 36  0 - 70 U/L Final     Sodium 02/03/2021 138  133 - 144 mmol/L Final     Potassium 02/03/2021 4.2  3.4 - 5.3 mmol/L Final     Chloride 02/03/2021 105  94 - 109 mmol/L  Final     Carbon Dioxide 02/03/2021 27  20 - 32 mmol/L Final     Anion Gap 02/03/2021 6  3 - 14 mmol/L Final     Glucose 02/03/2021 137* 70 - 99 mg/dL Final    Non Fasting     Urea Nitrogen 02/03/2021 21  7 - 30 mg/dL Final     Creatinine 02/03/2021 1.29* 0.66 - 1.25 mg/dL Final     GFR Estimate 02/03/2021 53* >60 mL/min/[1.73_m2] Final    Comment: Non  GFR Calc  Starting 12/18/2018, serum creatinine based estimated GFR (eGFR) will be   calculated using the Chronic Kidney Disease Epidemiology Collaboration   (CKD-EPI) equation.       GFR Estimate If Black 02/03/2021 62  >60 mL/min/[1.73_m2] Final    Comment:  GFR Calc  Starting 12/18/2018, serum creatinine based estimated GFR (eGFR) will be   calculated using the Chronic Kidney Disease Epidemiology Collaboration   (CKD-EPI) equation.       Calcium 02/03/2021 9.0  8.5 - 10.1 mg/dL Final       Procedure: steroid injection of right shoulder using posterior approach  Indication: pain, arthritis  Consent: verbal and written consent was obtained from patient after risks and benefits are discussed  Description:  Time out procedure is completed.  Joint space is identified and marked.  Betadine prep is completed.  Ethyl chloride spray is used for topical anesthesia.  1 cc Kenalog 40mg/cc and 4 cc 1% Lidocaine without epinephrine are injected without difficulty.  Patient tolerated procedure well.    Procedure: steroid injection of left shoulder using posterior approach  Indication: pain, arthritis  Consent: verbal and written consent was obtained from patient after risks and benefits are discussed  Description:  Time out procedure is completed.  Joint space is identified and marked.  Betadine prep is completed.  Ethyl chloride spray is used for topical anesthesia.  1 cc Kenalog 40mg/cc and 4 cc 1% Lidocaine without epinephrine are injected without difficulty.  Patient tolerated procedure well.

## 2021-02-08 ENCOUNTER — OFFICE VISIT (OUTPATIENT)
Dept: FAMILY MEDICINE | Facility: OTHER | Age: 76
End: 2021-02-08
Attending: FAMILY MEDICINE
Payer: COMMERCIAL

## 2021-02-08 VITALS
DIASTOLIC BLOOD PRESSURE: 78 MMHG | WEIGHT: 165.8 LBS | BODY MASS INDEX: 25.97 KG/M2 | OXYGEN SATURATION: 98 % | HEART RATE: 60 BPM | SYSTOLIC BLOOD PRESSURE: 140 MMHG | TEMPERATURE: 97.6 F

## 2021-02-08 DIAGNOSIS — E78.00 HYPERCHOLESTEROLEMIA: Primary | ICD-10-CM

## 2021-02-08 DIAGNOSIS — M19.019 ARTHRITIS OF SHOULDER: ICD-10-CM

## 2021-02-08 DIAGNOSIS — I10 BENIGN ESSENTIAL HYPERTENSION: ICD-10-CM

## 2021-02-08 DIAGNOSIS — J31.0 CHRONIC RHINITIS: ICD-10-CM

## 2021-02-08 PROCEDURE — 99213 OFFICE O/P EST LOW 20 MIN: CPT | Mod: 25 | Performed by: FAMILY MEDICINE

## 2021-02-08 PROCEDURE — 20610 DRAIN/INJ JOINT/BURSA W/O US: CPT | Mod: 50 | Performed by: FAMILY MEDICINE

## 2021-02-08 PROCEDURE — G0463 HOSPITAL OUTPT CLINIC VISIT: HCPCS | Mod: 25

## 2021-02-08 RX ORDER — LIDOCAINE HYDROCHLORIDE 10 MG/ML
8 INJECTION, SOLUTION INFILTRATION; PERINEURAL ONCE
Status: COMPLETED | OUTPATIENT
Start: 2021-02-08 | End: 2021-02-08

## 2021-02-08 RX ORDER — TRIAMCINOLONE ACETONIDE 40 MG/ML
80 INJECTION, SUSPENSION INTRA-ARTICULAR; INTRAMUSCULAR ONCE
Status: DISCONTINUED | OUTPATIENT
Start: 2021-02-08 | End: 2021-02-08

## 2021-02-08 RX ORDER — TRIAMCINOLONE ACETONIDE 40 MG/ML
80 INJECTION, SUSPENSION INTRA-ARTICULAR; INTRAMUSCULAR ONCE
Status: COMPLETED | OUTPATIENT
Start: 2021-02-08 | End: 2021-02-08

## 2021-02-08 RX ORDER — SIMVASTATIN 40 MG
40 TABLET ORAL AT BEDTIME
Qty: 90 TABLET | Refills: 3 | Status: SHIPPED | OUTPATIENT
Start: 2021-02-08 | End: 2022-02-10

## 2021-02-08 RX ORDER — LIDOCAINE HYDROCHLORIDE 10 MG/ML
18 INJECTION, SOLUTION INFILTRATION; PERINEURAL ONCE
Status: DISCONTINUED | OUTPATIENT
Start: 2021-02-08 | End: 2021-02-08

## 2021-02-08 RX ADMIN — LIDOCAINE HYDROCHLORIDE 8 ML: 10 INJECTION, SOLUTION INFILTRATION; PERINEURAL at 09:06

## 2021-02-08 RX ADMIN — TRIAMCINOLONE ACETONIDE 80 MG: 40 INJECTION, SUSPENSION INTRA-ARTICULAR; INTRAMUSCULAR at 09:07

## 2021-02-08 ASSESSMENT — PAIN SCALES - GENERAL: PAINLEVEL: NO PAIN (0)

## 2021-02-08 NOTE — NURSING NOTE
"Chief Complaint   Patient presents with     Imm/Inj     Flu Shot       Initial BP (!) 140/78   Pulse 60   Temp 97.6  F (36.4  C)   Wt 75.2 kg (165 lb 12.8 oz)   SpO2 98%   BMI 25.97 kg/m   Estimated body mass index is 25.97 kg/m  as calculated from the following:    Height as of 11/23/20: 1.702 m (5' 7\").    Weight as of this encounter: 75.2 kg (165 lb 12.8 oz).  Medication Reconciliation: complete  Amy Horn LPN    "

## 2021-02-22 ENCOUNTER — IMMUNIZATION (OUTPATIENT)
Dept: FAMILY MEDICINE | Facility: OTHER | Age: 76
End: 2021-02-22
Attending: FAMILY MEDICINE
Payer: COMMERCIAL

## 2021-02-22 PROCEDURE — 91300 PR COVID VAC PFIZER DIL RECON 30 MCG/0.3 ML IM: CPT | Performed by: COUNSELOR

## 2021-03-15 ENCOUNTER — IMMUNIZATION (OUTPATIENT)
Dept: FAMILY MEDICINE | Facility: OTHER | Age: 76
End: 2021-03-15
Attending: FAMILY MEDICINE
Payer: COMMERCIAL

## 2021-03-15 PROCEDURE — 91300 PR COVID VAC PFIZER DIL RECON 30 MCG/0.3 ML IM: CPT

## 2021-04-21 NOTE — PROGRESS NOTES
"    Assessment & Plan     1. Acute right-sided low back pain without sciatica  Steroid burst prescribed.  Few more pain pills given for nighttime use.  Follow-up next week if no improvement noted  - predniSONE (DELTASONE) 20 MG tablet; Take 1 tablet (20 mg) by mouth daily for 5 days  Dispense: 5 tablet; Refill: 0  - acetaminophen-codeine (TYLENOL #3) 300-30 MG tablet; Take 1 tablet by mouth 2 times daily as needed for severe pain  Dispense: 10 tablet; Refill: 0       BMI:   Estimated body mass index is 25.06 kg/m  as calculated from the following:    Height as of this encounter: 1.702 m (5' 7\").    Weight as of this encounter: 72.6 kg (160 lb).     Return if symptoms worsen or fail to improve.    Hodan Tapia MD  [facility] - ROSALINDA Caal is a 76 year old who presents for the following health issues     HPI     Musculoskeletal problem/pain  Onset/Duration: 6 days   Description  Location: hip - right  Joint Swelling: no  Redness: no  Pain: YES  Warmth: no  Intensity:  moderate, severe  Progression of Symptoms:  improving  Accompanying signs and symptoms:   Fevers: no  Numbness/tingling/weakness: YES  History  Trauma to the area: stepped into a boat, boat moved some, felt some pain, didn't fall  Recent illness:  no  Previous similar problem: no  Previous evaluation:  no  Precipitating or alleviating factors:  Aggravating factors include: walking  Therapies tried and outcome: rest/inactivity, acetaminophen and muscle relaxer       Review of Systems   Constitutional, HEENT, cardiovascular, pulmonary, gi and gu systems are negative, except as otherwise noted.      Objective    BP (!) 142/70 (BP Location: Right arm, Patient Position: Chair, Cuff Size: Adult Regular)   Pulse 64   Temp 97.7  F (36.5  C) (Tympanic)   Ht 1.702 m (5' 7\")   Wt 72.6 kg (160 lb)   SpO2 97%   BMI 25.06 kg/m    Body mass index is 25.06 kg/m .  Physical Exam   GENERAL: healthy, alert and no distress  MS: " pain in right buttock and right thigh, able to ambulate  PSYCH: mentation appears normal, affect normal/bright

## 2021-04-23 ENCOUNTER — OFFICE VISIT (OUTPATIENT)
Dept: FAMILY MEDICINE | Facility: OTHER | Age: 76
End: 2021-04-23
Attending: FAMILY MEDICINE
Payer: COMMERCIAL

## 2021-04-23 VITALS
DIASTOLIC BLOOD PRESSURE: 70 MMHG | HEART RATE: 64 BPM | HEIGHT: 67 IN | OXYGEN SATURATION: 97 % | SYSTOLIC BLOOD PRESSURE: 142 MMHG | WEIGHT: 160 LBS | TEMPERATURE: 97.7 F | BODY MASS INDEX: 25.11 KG/M2

## 2021-04-23 DIAGNOSIS — M54.50 ACUTE RIGHT-SIDED LOW BACK PAIN WITHOUT SCIATICA: Primary | ICD-10-CM

## 2021-04-23 PROCEDURE — 99213 OFFICE O/P EST LOW 20 MIN: CPT | Performed by: FAMILY MEDICINE

## 2021-04-23 PROCEDURE — G0463 HOSPITAL OUTPT CLINIC VISIT: HCPCS

## 2021-04-23 RX ORDER — PREDNISONE 20 MG/1
20 TABLET ORAL DAILY
Qty: 5 TABLET | Refills: 0 | Status: SHIPPED | OUTPATIENT
Start: 2021-04-23 | End: 2021-04-29

## 2021-04-23 ASSESSMENT — MIFFLIN-ST. JEOR: SCORE: 1409.39

## 2021-04-23 ASSESSMENT — PAIN SCALES - GENERAL: PAINLEVEL: NO PAIN (1)

## 2021-04-23 NOTE — NURSING NOTE
"Chief Complaint   Patient presents with     Musculoskeletal Problem       Initial BP (!) 142/70 (BP Location: Right arm, Patient Position: Chair, Cuff Size: Adult Regular)   Pulse 64   Temp 97.7  F (36.5  C) (Tympanic)   Ht 1.702 m (5' 7\")   Wt 72.6 kg (160 lb)   SpO2 97%   BMI 25.06 kg/m   Estimated body mass index is 25.06 kg/m  as calculated from the following:    Height as of this encounter: 1.702 m (5' 7\").    Weight as of this encounter: 72.6 kg (160 lb).  Medication Reconciliation: complete  Neris Gaitan LPN    "

## 2021-04-27 NOTE — PROGRESS NOTES
"    Assessment & Plan     1. Acute right-sided low back pain without sciatica  Patient is more likely muscular.  PT referral ordered.  Patient states he can manage the pain during the day, but he requests something stronger for nighttime use.  Patient agrees to stop using T#3, and Norco is given for short-term use at nighttime.  Follow-up as needed.  - XR LUMBAR SPINE 2/3 VIEWS (Clinic Performed); Future  - XR Hip Right 2-3 Views (Clinic Performed); Future  - HYDROcodone-acetaminophen (NORCO) 5-325 MG tablet; Take 1 tablet by mouth 2 times daily as needed for severe pain  Dispense: 10 tablet; Refill: 0  - PHYSICAL THERAPY REFERRAL; Future    2. Pain of right thigh  As above.  - XR LUMBAR SPINE 2/3 VIEWS (Clinic Performed); Future  - XR Hip Right 2-3 Views (Clinic Performed); Future  - HYDROcodone-acetaminophen (NORCO) 5-325 MG tablet; Take 1 tablet by mouth 2 times daily as needed for severe pain  Dispense: 10 tablet; Refill: 0  - PHYSICAL THERAPY REFERRAL; Future       BMI:   Estimated body mass index is 25.18 kg/m  as calculated from the following:    Height as of this encounter: 1.702 m (5' 7\").    Weight as of this encounter: 72.9 kg (160 lb 12.8 oz).     Return if symptoms worsen or fail to improve.    Hodan Tapia MD  [facility] - ROSALINDA Caal is a 76 year old who presents for the following health issues  accompanied by his spouse:    HPI     Chronic/Recurring Back Pain Follow Up    Where is your back pain located? (Select all that apply) hip right    How would you describe your back pain?  burning, cramping, sharp and stabbing    Where does your back pain spread? the right  thigh    Since your last clinic visit for back pain, how has your pain changed? unchanged    Does your back pain interfere with your job? Not applicable    Since your last visit, have you tried any new treatment? Tylenol #3- not effective. Prednisone- not effective      Review of Systems " "  Constitutional, HEENT, cardiovascular, pulmonary, gi and gu systems are negative, except as otherwise noted.      Objective    /66   Pulse 65   Temp 97.3  F (36.3  C) (Tympanic)   Resp 16   Ht 1.702 m (5' 7\")   Wt 72.9 kg (160 lb 12.8 oz)   SpO2 97%   BMI 25.18 kg/m    Body mass index is 25.18 kg/m .  Physical Exam   GENERAL: healthy, alert and no distress  PSYCH: mentation appears normal, affect normal/bright    Xray - Reviewed and interpreted by me.  Hip shows mild arthritis, lumbar spine shows moderate arthritis and straightening of the lumbar spine, no evidence of compression fracture, etc                   "

## 2021-04-29 ENCOUNTER — OFFICE VISIT (OUTPATIENT)
Dept: FAMILY MEDICINE | Facility: OTHER | Age: 76
End: 2021-04-29
Attending: FAMILY MEDICINE
Payer: COMMERCIAL

## 2021-04-29 ENCOUNTER — ANCILLARY PROCEDURE (OUTPATIENT)
Dept: GENERAL RADIOLOGY | Facility: OTHER | Age: 76
End: 2021-04-29
Attending: FAMILY MEDICINE
Payer: COMMERCIAL

## 2021-04-29 VITALS
TEMPERATURE: 97.3 F | BODY MASS INDEX: 25.24 KG/M2 | SYSTOLIC BLOOD PRESSURE: 126 MMHG | WEIGHT: 160.8 LBS | HEIGHT: 67 IN | HEART RATE: 65 BPM | OXYGEN SATURATION: 97 % | RESPIRATION RATE: 16 BRPM | DIASTOLIC BLOOD PRESSURE: 66 MMHG

## 2021-04-29 DIAGNOSIS — M54.50 ACUTE RIGHT-SIDED LOW BACK PAIN WITHOUT SCIATICA: ICD-10-CM

## 2021-04-29 DIAGNOSIS — M54.50 ACUTE RIGHT-SIDED LOW BACK PAIN WITHOUT SCIATICA: Primary | ICD-10-CM

## 2021-04-29 DIAGNOSIS — M79.651 PAIN OF RIGHT THIGH: ICD-10-CM

## 2021-04-29 PROCEDURE — G0463 HOSPITAL OUTPT CLINIC VISIT: HCPCS

## 2021-04-29 PROCEDURE — 99214 OFFICE O/P EST MOD 30 MIN: CPT | Performed by: FAMILY MEDICINE

## 2021-04-29 PROCEDURE — 73502 X-RAY EXAM HIP UNI 2-3 VIEWS: CPT | Mod: TC,FY

## 2021-04-29 PROCEDURE — 72100 X-RAY EXAM L-S SPINE 2/3 VWS: CPT | Mod: TC,FY

## 2021-04-29 RX ORDER — HYDROCODONE BITARTRATE AND ACETAMINOPHEN 5; 325 MG/1; MG/1
1 TABLET ORAL 2 TIMES DAILY PRN
Qty: 10 TABLET | Refills: 0 | Status: SHIPPED | OUTPATIENT
Start: 2021-04-29 | End: 2021-08-09

## 2021-04-29 ASSESSMENT — MIFFLIN-ST. JEOR: SCORE: 1413.01

## 2021-04-29 ASSESSMENT — PAIN SCALES - GENERAL: PAINLEVEL: SEVERE PAIN (6)

## 2021-04-29 NOTE — NURSING NOTE
"Chief Complaint   Patient presents with     Musculoskeletal Problem       Initial /66   Pulse 65   Temp 97.3  F (36.3  C) (Tympanic)   Resp 16   Ht 1.702 m (5' 7\")   Wt 72.9 kg (160 lb 12.8 oz)   SpO2 97%   BMI 25.18 kg/m   Estimated body mass index is 25.18 kg/m  as calculated from the following:    Height as of this encounter: 1.702 m (5' 7\").    Weight as of this encounter: 72.9 kg (160 lb 12.8 oz).  Medication Reconciliation: complete  Rowan Escamilla LPN  "

## 2021-05-07 ENCOUNTER — TELEPHONE (OUTPATIENT)
Dept: FAMILY MEDICINE | Facility: OTHER | Age: 76
End: 2021-05-07

## 2021-05-07 DIAGNOSIS — M54.50 ACUTE RIGHT-SIDED LOW BACK PAIN WITHOUT SCIATICA: Primary | ICD-10-CM

## 2021-05-07 NOTE — TELEPHONE ENCOUNTER
Pt states that the Norco is not working.  He does not really want to take the Tylenol#3.  Do you have anything else that he can take.  The tylenol 500 only seems to relieve him for a couple hours.  He would still like to have the MRI ordered.  Pt will keep his PT appointment.

## 2021-05-07 NOTE — TELEPHONE ENCOUNTER
2:59 PM    Reason for Call: Phone Call    Description: Ten is requesting a MRI of his hip, tailbone and lower back he takes Tylenol it helps for a little bit after 3 to 4 hours the pain is back and he can't get into physical therapy until after May 13 at [facility].     Was an appointment offered for this call? No  If yes : Appointment type              Date    Preferred method for responding to this message: Telephone Call  What is your phone number ? [phone]    If we cannot reach you directly, may we leave a detailed response at the number you provided? Yes    Can this message wait until your PCP/provider returns, if available today? YES, No    Michelle Carter

## 2021-05-07 NOTE — TELEPHONE ENCOUNTER
I can order an MRI, but he likely won't be able to have that completed prior to starting PT, as they are usually pushed out about 2 weeks

## 2021-05-07 NOTE — TELEPHONE ENCOUNTER
Then he will need to stick with OTC Tylenol.  As long as he is not drinking any alcohol, and if he is only taking the plain Tylenol, he could take Tylenol  mg 2 tablets 4 times a day.

## 2021-05-17 PROBLEM — F11.90 CHRONIC, CONTINUOUS USE OF OPIOIDS: Chronic | Status: ACTIVE | Noted: 2021-05-17

## 2021-05-18 ENCOUNTER — TRANSFERRED RECORDS (OUTPATIENT)
Dept: HEALTH INFORMATION MANAGEMENT | Facility: CLINIC | Age: 76
End: 2021-05-18

## 2021-05-24 ENCOUNTER — HOSPITAL ENCOUNTER (OUTPATIENT)
Dept: MRI IMAGING | Facility: HOSPITAL | Age: 76
Discharge: HOME | End: 2021-05-24
Attending: FAMILY MEDICINE | Admitting: FAMILY MEDICINE
Payer: COMMERCIAL

## 2021-05-24 DIAGNOSIS — M54.50 ACUTE RIGHT-SIDED LOW BACK PAIN WITHOUT SCIATICA: ICD-10-CM

## 2021-05-24 PROCEDURE — 72148 MRI LUMBAR SPINE W/O DYE: CPT

## 2021-05-25 DIAGNOSIS — M54.50 ACUTE RIGHT-SIDED LOW BACK PAIN WITHOUT SCIATICA: Primary | ICD-10-CM

## 2021-05-26 ENCOUNTER — HOSPITAL ENCOUNTER (OUTPATIENT)
Dept: INTERVENTIONAL RADIOLOGY/VASCULAR | Facility: HOSPITAL | Age: 76
Discharge: HOME | End: 2021-05-26
Attending: FAMILY MEDICINE | Admitting: RADIOLOGY
Payer: COMMERCIAL

## 2021-05-26 DIAGNOSIS — M54.50 ACUTE RIGHT-SIDED LOW BACK PAIN WITHOUT SCIATICA: ICD-10-CM

## 2021-05-26 PROCEDURE — G0463 HOSPITAL OUTPT CLINIC VISIT: HCPCS

## 2021-06-04 ENCOUNTER — TELEPHONE (OUTPATIENT)
Dept: GENERAL RADIOLOGY | Facility: HOSPITAL | Age: 76
End: 2021-06-04

## 2021-06-04 NOTE — TELEPHONE ENCOUNTER
Called patient to see if he would like to come in earlier as new dates have opened up and he is scheduled out. There was no answer/message was left for him to call IR to get earlier dates/cm

## 2021-06-16 ENCOUNTER — TELEPHONE (OUTPATIENT)
Dept: INTERVENTIONAL RADIOLOGY/VASCULAR | Facility: HOSPITAL | Age: 76
End: 2021-06-16

## 2021-06-16 RX ORDER — LIDOCAINE HYDROCHLORIDE 10 MG/ML
5 INJECTION, SOLUTION EPIDURAL; INFILTRATION; INTRACAUDAL; PERINEURAL ONCE
Status: CANCELLED | OUTPATIENT
Start: 2021-06-16 | End: 2021-06-16

## 2021-06-16 RX ORDER — DEXAMETHASONE SODIUM PHOSPHATE 10 MG/ML
10 INJECTION, SOLUTION INTRAMUSCULAR; INTRAVENOUS ONCE
Status: CANCELLED | OUTPATIENT
Start: 2021-06-16 | End: 2021-06-16

## 2021-06-16 RX ORDER — IOPAMIDOL 612 MG/ML
15 INJECTION, SOLUTION INTRATHECAL ONCE
Status: CANCELLED | OUTPATIENT
Start: 2021-06-16 | End: 2021-06-16

## 2021-06-17 ENCOUNTER — HOSPITAL ENCOUNTER (OUTPATIENT)
Facility: HOSPITAL | Age: 76
Discharge: HOME | End: 2021-06-17
Attending: RADIOLOGY | Admitting: RADIOLOGY
Payer: COMMERCIAL

## 2021-06-17 ENCOUNTER — HOSPITAL ENCOUNTER (OUTPATIENT)
Dept: GENERAL RADIOLOGY | Facility: HOSPITAL | Age: 76
End: 2021-06-17
Attending: FAMILY MEDICINE
Payer: COMMERCIAL

## 2021-06-17 DIAGNOSIS — M47.817 LUMBOSACRAL SPONDYLOSIS: ICD-10-CM

## 2021-06-17 PROCEDURE — 250N000011 HC RX IP 250 OP 636: Performed by: RADIOLOGY

## 2021-06-17 PROCEDURE — 64483 NJX AA&/STRD TFRM EPI L/S 1: CPT | Mod: RT

## 2021-06-17 RX ORDER — DEXAMETHASONE SODIUM PHOSPHATE 10 MG/ML
10 INJECTION, SOLUTION INTRAMUSCULAR; INTRAVENOUS ONCE
Status: COMPLETED | OUTPATIENT
Start: 2021-06-17 | End: 2021-06-17

## 2021-06-17 RX ORDER — IOPAMIDOL 612 MG/ML
15 INJECTION, SOLUTION INTRATHECAL ONCE
Status: COMPLETED | OUTPATIENT
Start: 2021-06-17 | End: 2021-06-17

## 2021-06-17 RX ORDER — LIDOCAINE HYDROCHLORIDE 10 MG/ML
5 INJECTION, SOLUTION EPIDURAL; INFILTRATION; INTRACAUDAL; PERINEURAL ONCE
Status: DISCONTINUED | OUTPATIENT
Start: 2021-06-17 | End: 2021-06-18 | Stop reason: HOSPADM

## 2021-06-17 RX ADMIN — IOPAMIDOL 2 ML: 612 INJECTION, SOLUTION INTRATHECAL at 09:38

## 2021-06-17 RX ADMIN — DEXAMETHASONE SODIUM PHOSPHATE 10 MG: 10 INJECTION, SOLUTION INTRAMUSCULAR; INTRAVENOUS at 09:37

## 2021-06-17 NOTE — IP AVS SNAPSHOT
[address] [address]  Phone: [phone]                                    After Visit Summary   6/17/2021    Ten Flores    MRN: [id number]           After Visit Summary Signature Page    I have received my discharge instructions, and my questions have been answered. I have discussed any challenges I see with this plan with the nurse or doctor.    ..........................................................................................................................................  Patient/Patient Representative Signature      ..........................................................................................................................................  Patient Representative Print Name and Relationship to Patient    ..................................................               ................................................  Date                                   Time    ..........................................................................................................................................  Reviewed by Signature/Title    ...................................................              ..............................................  Date                                               Time          22EPIC Rev 08/18

## 2021-06-17 NOTE — DISCHARGE INSTRUCTIONS
Home number on file [phone] (home)  Is it ok to leave a message at this number(s)? Yes    Dr. Goldstein completed your procedure on 6/17/2021.    Current Pain Level (0-10 Scale): 6/10  Post Pain Level (0-10):  4/10    Radiology Discharge instructions for Steroid Injection    Activity Level:     Do not do any heavy activity or exercise for 24 hours.   Do not drive for 4 hours after your injection.  Diet:   Return to your normal diet.  Medications:   If you have stopped taking your Aspirin, Coumadin/Warfarin, Ibuprofen, or any   other blood thinner for this procedure you may resume in the morning unless   your primary care provider has given you other instructions.    Diabetics may see an increase in blood sugar after steroid injections. If you are concerned about your blood sugar, please contact your family doctor.    Site Care:  Remove the bandage and bathe or shower the morning after the procedure.      This is a Pain Management procedure.  You will be contacted in two weeks for follow up.    Call your Primary Care Provider if you have the following (if your primary care provider is not available please seek emergency care):   Nausea with vomiting   Severe headache   Drowsiness or confusion   Redness or drainage at the injection or puncture site   Temperature over 101 degrees F   Other concerns   Worsening back pain   Stiff neck

## 2021-07-02 ENCOUNTER — TELEPHONE (OUTPATIENT)
Dept: INTERVENTIONAL RADIOLOGY/VASCULAR | Facility: HOSPITAL | Age: 76
End: 2021-07-02

## 2021-07-02 NOTE — TELEPHONE ENCOUNTER
CONSULT PATIENT  PAIN INJECTION POST CALL    Procedure: Epidural TF right L4-5  Radiologist(s): Dr. Job Goldstein  Date of Procedure: 6-17-21    The patient was not available by telephone. Left message for patient to call [phone].          Marimar Soto

## 2021-07-07 ENCOUNTER — TELEPHONE (OUTPATIENT)
Dept: INTERVENTIONAL RADIOLOGY/VASCULAR | Facility: HOSPITAL | Age: 76
End: 2021-07-07

## 2021-07-07 ENCOUNTER — TELEPHONE (OUTPATIENT)
Dept: FAMILY MEDICINE | Facility: OTHER | Age: 76
End: 2021-07-07

## 2021-07-07 NOTE — TELEPHONE ENCOUNTER
11:25 AM    Reason for Call: Phone Call    Description: Patient called and states that he would like to discuss having another shot in his back as the first one didn't seem to work. Please call patient back for further discussion/ advisement.     Was an appointment offered for this call? No  If yes : Appointment type              Date    Preferred method for responding to this message: Telephone Call  What is your phone number ? [phone]    If we cannot reach you directly, may we leave a detailed response at the number you provided? Yes    Can this message wait until your PCP/provider returns, if available today? Not applicable, Provider is in today     Katherine Taylor

## 2021-07-07 NOTE — TELEPHONE ENCOUNTER
He should be able to contact Radiology to schedule second shot - see telephone message from 7/2/2021, he can call [phone]

## 2021-07-07 NOTE — TELEPHONE ENCOUNTER
CONSULT PATIENT  PAIN INJECTION POST CALL    Procedure:    Epidural TF right L4-5  Radiologist(s):        Dr. Job Goldstein  Date of Procedure: 6-17-21    I responded to the patient's questions/concerns.    Relief of pain from this injection    A = 90%  A- = 85%  B = 80%  B- =75%  C = 70%  C- = 65%  D = 60%  D- = 50%  F < 50%       Would you say this injection has been beneficial? Yes  If yes, for how long? Injection is currently relieving 100% of the pain the patient came in with.    Was there one injection that worked better than the other? No this is patients first injection.    Where is the pain? Pain has moved lower. Prior to this injection pain was mainly in the right hip and that pain has completely resolved. Pain is now in the tailbone area and in the muscles around the tailbone, sometimes travels into back of upper thigh, bilaterally but mainly the left.  Can you describe the pain? Sharp and crampy  Does the pain radiate anywhere? Yes, sometimes  If yes, where does it radiate and where does the pain stop? Back of upper bilateral thigh.    Is this new pain? Yes: pain is now in the tailbone area    Patient would like to pursue another injection.      Marimar Soto

## 2021-07-14 DIAGNOSIS — M79.671 RIGHT FOOT PAIN: ICD-10-CM

## 2021-07-15 RX ORDER — MELOXICAM 7.5 MG/1
TABLET ORAL
Qty: 90 TABLET | Refills: 1 | Status: SHIPPED | OUTPATIENT
Start: 2021-07-15 | End: 2022-01-10

## 2021-07-15 NOTE — TELEPHONE ENCOUNTER
Meloxicam 7.5mg    Last Written Prescription Date:  11/10/20  Last Fill Quantity: 90 tablet,   # refills: 1  Last Office Visit: 4/29/2021  Future Office visit:    Next 5 appointments (look out 90 days)    Aug 09, 2021  8:30 AM  (Arrive by 8:15 AM)  SHORT with Hodan Tapia MD  [facility] [address]  [facility] [address]  [phone]

## 2021-07-16 DIAGNOSIS — H91.93 DECREASED HEARING OF BOTH EARS: Primary | ICD-10-CM

## 2021-07-19 NOTE — PROGRESS NOTES
"    Assessment & Plan     1. Hypercholesterolemia  Labs reviewed, no medication changes recommended.  Continue simvastatin at same dose.  Follow-up in six months, future lab orders to be placed.    2. Benign essential hypertension  As above.    3. Arthritis of shoulder  Patient tolerated injections well.  Monitor for signs of infection.  Patient noted immediate improvement with lidocaine.  - Large Joint/Bursa injection and/or drainage (Shoulder, Knee)  - triamcinolone (KENALOG-40) injection 80 mg  - lidocaine 1 % injection 8 mL    4. Screening for malignant neoplasm of prostate  Ordered.  - PSA, screen; Future  - PSA, screen      Review of the result(s) of each unique test - Lipid, BMP, ALT       BMI:   Estimated body mass index is 25.06 kg/m  as calculated from the following:    Height as of this encounter: 1.702 m (5' 7\").    Weight as of this encounter: 72.6 kg (160 lb).       Return in about 6 months (around 2/9/2022) for Chronic Disease Management, Medication review.    Hodan Tapia MD  [facility] - ROSALINDA Caal is a 76 year old who presents for the following health issues     HPI     Hyperlipidemia Follow-Up      Are you regularly taking any medication or supplement to lower your cholesterol?   Yes- simvastatin    Are you having muscle aches or other side effects that you think could be caused by your cholesterol lowering medication?  No    Hypertension Follow-up      Do you check your blood pressure regularly outside of the clinic? No     Are you following a low salt diet? No    Are your blood pressures ever more than 140 on the top number (systolic) OR more   than 90 on the bottom number (diastolic), for example 140/90? No      How many servings of fruits and vegetables do you eat daily?  2-3    On average, how many sweetened beverages do you drink each day (Examples: soda, juice, sweet tea, etc.  Do NOT count diet or artificially sweetened beverages)?   0    How many " "days per week do you exercise enough to make your heart beat faster? 4    How many minutes a day do you exercise enough to make your heart beat faster? 20 - 29    How many days per week do you miss taking your medication? 0    Musculoskeletal problem/pain  Onset/Duration: several months   Description  Location: shoulders - bilateral  Joint Swelling: no  Redness: no  Pain: YES  Warmth: no  Intensity:  mild, moderate  Progression of Symptoms:  waxing and waning  Accompanying signs and symptoms:   Fevers: no  Numbness/tingling/weakness: no  History  Trauma to the area: no  Recent illness:  no  Previous similar problem: YES  Previous evaluation:  no  Precipitating or alleviating factors:  Aggravating factors include: overuse  Therapies tried and outcome: rest/inactivity and acetaminophen      Review of Systems   Constitutional, HEENT, cardiovascular, pulmonary, gi and gu systems are negative, except as otherwise noted.      Objective    /60 (BP Location: Right arm, Patient Position: Chair, Cuff Size: Adult Regular)   Pulse 60   Temp 98.1  F (36.7  C) (Tympanic)   Ht 1.702 m (5' 7\")   Wt 72.6 kg (160 lb)   SpO2 97%   BMI 25.06 kg/m    Body mass index is 25.06 kg/m .  Physical Exam   GENERAL: healthy, alert and no distress  PSYCH: mentation appears normal, affect normal/bright    Lab on 08/05/2021   Component Date Value Ref Range Status     Sodium 08/05/2021 138  133 - 144 mmol/L Final     Potassium 08/05/2021 4.2  3.4 - 5.3 mmol/L Final     Chloride 08/05/2021 104  94 - 109 mmol/L Final     Carbon Dioxide (CO2) 08/05/2021 25  20 - 32 mmol/L Final     Anion Gap 08/05/2021 9  3 - 14 mmol/L Final     Urea Nitrogen 08/05/2021 18  7 - 30 mg/dL Final     Creatinine 08/05/2021 1.18  0.66 - 1.25 mg/dL Final     Calcium 08/05/2021 9.3  8.5 - 10.1 mg/dL Final     Glucose 08/05/2021 96  70 - 99 mg/dL Final     GFR Estimate 08/05/2021 59* >60 mL/min/1.73m2 Final    As of July 11, 2021, eGFR is calculated by the CKD-EPI " creatinine equation, without race adjustment. eGFR can be influenced by muscle mass, exercise, and diet. The reported eGFR is an estimation only and is only applicable if the renal function is stable.     Cholesterol 08/05/2021 184  <200 mg/dL Final    Age 0-19 years  Desirable: <170 mg/dL  Borderline high:  170-199 mg/dl  High:            >199 mg/dl    Age 20 years and older  Desirable: <200 mg/dL     Triglycerides 08/05/2021 166* <150 mg/dL Final    0-9 years:  Normal:    Less than 75 mg/dL  Borderline high:  75-99 mg/dL  High:             Greater than or equal to 100 mg/dL    0-19 years:  Normal:    Less than 90 mg/dL  Borderline high:   mg/dL  High:             Greater than or equal to 130 mg/dL    20 years and older:  Normal:    Less than 150 mg/dL  Borderline high:  150-199 mg/dL  High:             200-499 mg/dL  Very high:   Greater than or equal to 500 mg/dL     Direct Measure HDL 08/05/2021 44  >=40 mg/dL Final    0-19 years:       Greater than or equal to 45 mg/dL   Low: Less than 40 mg/dL   Borderline low: 40-44 mg/dL     20 years and older:   Female: Greater than or equal to 50 mg/dL   Male:   Greater than or equal to 40 mg/dL          LDL Cholesterol Calculated 08/05/2021 107* <=100 mg/dL Final    Age 0-19 years:  Desirable: 0-110 mg/dL   Borderline high: 110-129 mg/dL   High: >= 130 mg/dL    Age 20 years and older:  Desirable: <100mg/dL  Above desirable: 100-129 mg/dL   Borderline high: 130-159 mg/dL   High: 160-189 mg/dL   Very high: >= 190 mg/dL     Non HDL Cholesterol 08/05/2021 140* <130 mg/dL Final    0-19 years:  Desirable:          Less than 120 mg/dL  Borderline high:   120-144 mg/dL  High:                   Greater than or equal to 145 mg/dL    20 years and older:  Desirable:          130 mg/dL  Above Desirable: 130-159 mg/dL  Borderline high:   160-189 mg/dL  High:               190-219 mg/dL  Very high:     Greater than or equal to 220 mg/dL     Patient Fasting > 8hrs? 08/05/2021 Yes    Final     ALT 08/05/2021 37  0 - 70 U/L Final     Hold Specimen 08/05/2021 [facility]   Final     Hold Specimen 08/05/2021 [facility]   Final       Procedure: steroid injection of right shoulder joint using posterior approach  Indication: pain, arthritis  Consent: verbal and written consent was obtained from patient after risks and benefits are discussed  Description:  Time out procedure is completed.  Joint space is identified and marked.  Betadine prep is completed.  Ethyl chloride spray is used for topical anesthesia.  1 cc Kenalog 40mg/cc and 4 cc 1% Lidocaine without epinephrine are injected without difficulty.  Patient tolerated procedure well.    Procedure: steroid injection of left shoulder joint using posterior approach  Indication: pain, arthritis  Consent: verbal and written consent was obtained from patient after risks and benefits are discussed  Description:  Time out procedure is completed.  Joint space is identified and marked.  Betadine prep is completed.  Ethyl chloride spray is used for topical anesthesia.  1 cc Kenalog 40mg/cc and 4 cc 1% Lidocaine without epinephrine are injected without difficulty.  Patient tolerated procedure well.

## 2021-07-20 ENCOUNTER — OFFICE VISIT (OUTPATIENT)
Dept: AUDIOLOGY | Facility: OTHER | Age: 76
End: 2021-07-20
Attending: AUDIOLOGIST
Payer: COMMERCIAL

## 2021-07-20 DIAGNOSIS — H90.3 SENSORINEURAL HEARING LOSS (SNHL) OF BOTH EARS: Primary | ICD-10-CM

## 2021-07-20 DIAGNOSIS — H91.93 DECREASED HEARING OF BOTH EARS: ICD-10-CM

## 2021-07-20 PROCEDURE — 92556 SPEECH AUDIOMETRY COMPLETE: CPT | Performed by: AUDIOLOGIST

## 2021-07-20 PROCEDURE — V5299 HEARING SERVICE: HCPCS

## 2021-07-20 PROCEDURE — 92552 PURE TONE AUDIOMETRY AIR: CPT | Performed by: AUDIOLOGIST

## 2021-07-20 NOTE — PROGRESS NOTES
Audiology Evaluation Completed. Please refer SCANNED AUDIOGRAM and/or TYMPANOGRAM for BACKGROUND, RESULTS, RECOMMENDATIONS.      Sadie FAIRBANKS, [facility]  Audiologist #[id number]

## 2021-07-20 NOTE — PROGRESS NOTES
HEARING AID CHECK    BACKGROUND:  Ten Flores, a 76 year old male, was seen today for an hearing aid check.  Mr. Flores wears Binaural Oticon OPN 3 hearing aids.  Mr. Flores reported no concerns.    FINDINGS:  Visual inspection and cleaning provided.   C-stop changed with new. 10mm SV  domes changed with new. Battery was tested and good. Good listening check.    Reviewed cleaning, troubleshooting, and preventative care with patient. Any cleaning tools used were provided as customer courtesy.    Services performed and warranty reviewed with patient.    PLAN:  Patient to be seen next by audiologist. Mr. Flores will return to clinic in 12 months, sooner if needed. Patient had no further questions and reports satisfaction.         Kavitha Cabello  Audiology Assistant  [facility]  [phone]

## 2021-07-24 DIAGNOSIS — F41.9 ANXIETY: ICD-10-CM

## 2021-07-24 DIAGNOSIS — I10 BENIGN ESSENTIAL HYPERTENSION: ICD-10-CM

## 2021-07-25 RX ORDER — METOPROLOL SUCCINATE 25 MG/1
TABLET, EXTENDED RELEASE ORAL
Qty: 90 TABLET | Refills: 1 | Status: SHIPPED | OUTPATIENT
Start: 2021-07-25 | End: 2022-01-21

## 2021-08-05 ENCOUNTER — TELEPHONE (OUTPATIENT)
Dept: FAMILY MEDICINE | Facility: OTHER | Age: 76
End: 2021-08-05

## 2021-08-05 ENCOUNTER — LAB (OUTPATIENT)
Dept: LAB | Facility: OTHER | Age: 76
End: 2021-08-05
Payer: COMMERCIAL

## 2021-08-05 DIAGNOSIS — I10 BENIGN ESSENTIAL HYPERTENSION: Primary | ICD-10-CM

## 2021-08-05 DIAGNOSIS — E78.00 HYPERCHOLESTEROLEMIA: ICD-10-CM

## 2021-08-05 DIAGNOSIS — I10 BENIGN ESSENTIAL HYPERTENSION: ICD-10-CM

## 2021-08-05 LAB
ALT SERPL W P-5'-P-CCNC: 37 U/L (ref 0–70)
ANION GAP SERPL CALCULATED.3IONS-SCNC: 9 MMOL/L (ref 3–14)
BUN SERPL-MCNC: 18 MG/DL (ref 7–30)
CALCIUM SERPL-MCNC: 9.3 MG/DL (ref 8.5–10.1)
CHLORIDE BLD-SCNC: 104 MMOL/L (ref 94–109)
CHOLEST SERPL-MCNC: 184 MG/DL
CO2 SERPL-SCNC: 25 MMOL/L (ref 20–32)
CREAT SERPL-MCNC: 1.18 MG/DL (ref 0.66–1.25)
FASTING STATUS PATIENT QL REPORTED: YES
GFR SERPL CREATININE-BSD FRML MDRD: 59 ML/MIN/1.73M2
GLUCOSE BLD-MCNC: 96 MG/DL (ref 70–99)
HDLC SERPL-MCNC: 44 MG/DL
HOLD SPECIMEN: NORMAL
HOLD SPECIMEN: NORMAL
LDLC SERPL CALC-MCNC: 107 MG/DL
NONHDLC SERPL-MCNC: 140 MG/DL
POTASSIUM BLD-SCNC: 4.2 MMOL/L (ref 3.4–5.3)
SODIUM SERPL-SCNC: 138 MMOL/L (ref 133–144)
TRIGL SERPL-MCNC: 166 MG/DL

## 2021-08-05 PROCEDURE — 36415 COLL VENOUS BLD VENIPUNCTURE: CPT | Mod: ZL

## 2021-08-05 PROCEDURE — 82465 ASSAY BLD/SERUM CHOLESTEROL: CPT | Mod: ZL

## 2021-08-05 PROCEDURE — 80048 BASIC METABOLIC PNL TOTAL CA: CPT | Mod: ZL

## 2021-08-05 PROCEDURE — 84460 ALANINE AMINO (ALT) (SGPT): CPT | Mod: ZL

## 2021-08-09 ENCOUNTER — OFFICE VISIT (OUTPATIENT)
Dept: FAMILY MEDICINE | Facility: OTHER | Age: 76
End: 2021-08-09
Attending: FAMILY MEDICINE
Payer: COMMERCIAL

## 2021-08-09 VITALS
HEART RATE: 60 BPM | DIASTOLIC BLOOD PRESSURE: 60 MMHG | SYSTOLIC BLOOD PRESSURE: 112 MMHG | TEMPERATURE: 98.1 F | BODY MASS INDEX: 25.11 KG/M2 | OXYGEN SATURATION: 97 % | HEIGHT: 67 IN | WEIGHT: 160 LBS

## 2021-08-09 DIAGNOSIS — E78.00 HYPERCHOLESTEROLEMIA: Primary | ICD-10-CM

## 2021-08-09 DIAGNOSIS — M19.019 ARTHRITIS OF SHOULDER: ICD-10-CM

## 2021-08-09 DIAGNOSIS — Z12.5 SCREENING FOR MALIGNANT NEOPLASM OF PROSTATE: ICD-10-CM

## 2021-08-09 DIAGNOSIS — I10 BENIGN ESSENTIAL HYPERTENSION: ICD-10-CM

## 2021-08-09 LAB — PSA SERPL-MCNC: 1.95 UG/L (ref 0–4)

## 2021-08-09 PROCEDURE — 36415 COLL VENOUS BLD VENIPUNCTURE: CPT | Mod: ZL | Performed by: FAMILY MEDICINE

## 2021-08-09 PROCEDURE — 99214 OFFICE O/P EST MOD 30 MIN: CPT | Mod: 25 | Performed by: FAMILY MEDICINE

## 2021-08-09 PROCEDURE — G0103 PSA SCREENING: HCPCS | Mod: ZL | Performed by: FAMILY MEDICINE

## 2021-08-09 PROCEDURE — G0463 HOSPITAL OUTPT CLINIC VISIT: HCPCS

## 2021-08-09 PROCEDURE — 20610 DRAIN/INJ JOINT/BURSA W/O US: CPT | Performed by: FAMILY MEDICINE

## 2021-08-09 PROCEDURE — G0463 HOSPITAL OUTPT CLINIC VISIT: HCPCS | Mod: 25

## 2021-08-09 RX ORDER — LIDOCAINE HYDROCHLORIDE 10 MG/ML
18 INJECTION, SOLUTION INFILTRATION; PERINEURAL ONCE
Status: DISCONTINUED | OUTPATIENT
Start: 2021-08-09 | End: 2021-08-09

## 2021-08-09 RX ORDER — LIDOCAINE HYDROCHLORIDE 10 MG/ML
8 INJECTION, SOLUTION INFILTRATION; PERINEURAL ONCE
Status: COMPLETED | OUTPATIENT
Start: 2021-08-09 | End: 2021-08-09

## 2021-08-09 RX ORDER — TRIAMCINOLONE ACETONIDE 40 MG/ML
80 INJECTION, SUSPENSION INTRA-ARTICULAR; INTRAMUSCULAR ONCE
Status: COMPLETED | OUTPATIENT
Start: 2021-08-09 | End: 2021-08-09

## 2021-08-09 RX ADMIN — TRIAMCINOLONE ACETONIDE 80 MG: 40 INJECTION, SUSPENSION INTRA-ARTICULAR; INTRAMUSCULAR at 09:05

## 2021-08-09 RX ADMIN — LIDOCAINE HYDROCHLORIDE 8 ML: 10 INJECTION, SOLUTION INFILTRATION; PERINEURAL at 09:04

## 2021-08-09 ASSESSMENT — ANXIETY QUESTIONNAIRES
3. WORRYING TOO MUCH ABOUT DIFFERENT THINGS: NOT AT ALL
7. FEELING AFRAID AS IF SOMETHING AWFUL MIGHT HAPPEN: NOT AT ALL
GAD7 TOTAL SCORE: 0
1. FEELING NERVOUS, ANXIOUS, OR ON EDGE: NOT AT ALL
2. NOT BEING ABLE TO STOP OR CONTROL WORRYING: NOT AT ALL
5. BEING SO RESTLESS THAT IT IS HARD TO SIT STILL: NOT AT ALL
6. BECOMING EASILY ANNOYED OR IRRITABLE: NOT AT ALL
IF YOU CHECKED OFF ANY PROBLEMS ON THIS QUESTIONNAIRE, HOW DIFFICULT HAVE THESE PROBLEMS MADE IT FOR YOU TO DO YOUR WORK, TAKE CARE OF THINGS AT HOME, OR GET ALONG WITH OTHER PEOPLE: NOT DIFFICULT AT ALL
4. TROUBLE RELAXING: NOT AT ALL

## 2021-08-09 ASSESSMENT — PATIENT HEALTH QUESTIONNAIRE - PHQ9: SUM OF ALL RESPONSES TO PHQ QUESTIONS 1-9: 3

## 2021-08-09 ASSESSMENT — PAIN SCALES - GENERAL: PAINLEVEL: MILD PAIN (3)

## 2021-08-09 ASSESSMENT — MIFFLIN-ST. JEOR: SCORE: 1409.39

## 2021-08-09 NOTE — NURSING NOTE
"Chief Complaint   Patient presents with     Lipids     Hypertension     Shoulder Pain       Initial Pulse 60   Temp 98.1  F (36.7  C) (Tympanic)   Ht 1.702 m (5' 7\")   Wt 72.6 kg (160 lb)   SpO2 97%   BMI 25.06 kg/m   Estimated body mass index is 25.06 kg/m  as calculated from the following:    Height as of this encounter: 1.702 m (5' 7\").    Weight as of this encounter: 72.6 kg (160 lb).  Medication Reconciliation: complete  Neris Gaitan LPN    "

## 2021-08-10 ASSESSMENT — ANXIETY QUESTIONNAIRES: GAD7 TOTAL SCORE: 0

## 2021-08-24 RX ORDER — IOPAMIDOL 612 MG/ML
15 INJECTION, SOLUTION INTRATHECAL ONCE
Status: CANCELLED | OUTPATIENT
Start: 2021-08-24 | End: 2021-08-24

## 2021-08-24 RX ORDER — METHYLPREDNISOLONE ACETATE 80 MG/ML
80 INJECTION, SUSPENSION INTRA-ARTICULAR; INTRALESIONAL; INTRAMUSCULAR; SOFT TISSUE ONCE
Status: CANCELLED | OUTPATIENT
Start: 2021-08-24 | End: 2021-08-24

## 2021-08-24 RX ORDER — LIDOCAINE HYDROCHLORIDE 10 MG/ML
5 INJECTION, SOLUTION EPIDURAL; INFILTRATION; INTRACAUDAL; PERINEURAL ONCE
Status: CANCELLED | OUTPATIENT
Start: 2021-08-24 | End: 2021-08-24

## 2021-08-25 ENCOUNTER — HOSPITAL ENCOUNTER (OUTPATIENT)
Facility: HOSPITAL | Age: 76
Discharge: HOME | End: 2021-08-25
Attending: RADIOLOGY | Admitting: RADIOLOGY
Payer: COMMERCIAL

## 2021-08-25 ENCOUNTER — HOSPITAL ENCOUNTER (OUTPATIENT)
Dept: INTERVENTIONAL RADIOLOGY/VASCULAR | Facility: HOSPITAL | Age: 76
End: 2021-08-25
Attending: FAMILY MEDICINE
Payer: COMMERCIAL

## 2021-08-25 DIAGNOSIS — M47.817 LUMBOSACRAL SPONDYLOSIS: ICD-10-CM

## 2021-08-25 PROCEDURE — C1751 CATH, INF, PER/CENT/MIDLINE: HCPCS

## 2021-08-25 PROCEDURE — 250N000011 HC RX IP 250 OP 636: Performed by: RADIOLOGY

## 2021-08-25 RX ORDER — LIDOCAINE HYDROCHLORIDE 10 MG/ML
5 INJECTION, SOLUTION EPIDURAL; INFILTRATION; INTRACAUDAL; PERINEURAL ONCE
Status: DISCONTINUED | OUTPATIENT
Start: 2021-08-25 | End: 2021-08-26 | Stop reason: HOSPADM

## 2021-08-25 RX ORDER — IOPAMIDOL 612 MG/ML
15 INJECTION, SOLUTION INTRATHECAL ONCE
Status: COMPLETED | OUTPATIENT
Start: 2021-08-25 | End: 2021-08-25

## 2021-08-25 RX ORDER — METHYLPREDNISOLONE ACETATE 80 MG/ML
80 INJECTION, SUSPENSION INTRA-ARTICULAR; INTRALESIONAL; INTRAMUSCULAR; SOFT TISSUE ONCE
Status: COMPLETED | OUTPATIENT
Start: 2021-08-25 | End: 2021-08-25

## 2021-08-25 RX ADMIN — IOPAMIDOL 6 ML: 612 INJECTION, SOLUTION INTRATHECAL at 11:58

## 2021-08-25 RX ADMIN — METHYLPREDNISOLONE ACETATE 80 MG: 80 INJECTION, SUSPENSION INTRA-ARTICULAR; INTRALESIONAL; INTRAMUSCULAR; SOFT TISSUE at 11:58

## 2021-08-25 NOTE — DISCHARGE INSTRUCTIONS
Cell number on file:    Telephone Information:   Mobile [phone]     Is it ok to leave a message at this number(s)? Yes    Dr. Goldstein completed your procedure on 8/25/2021.    Current Pain Level (0-10 Scale): 8/10  Post Pain Level (0-10):  3/10    Radiology Discharge instructions for Steroid Injection    Activity Level:     Do not do any heavy activity or exercise for 24 hours.   Do not drive for 4 hours after your injection.  Diet:   Return to your normal diet.  Medications:   If you have stopped taking your Aspirin, Coumadin/Warfarin, Ibuprofen, or any   other blood thinner for this procedure you may resume in the morning unless   your primary care provider has given you other instructions.    Diabetics may see an increase in blood sugar after steroid injections. If you are concerned about your blood sugar, please contact your family doctor.    Site Care:  Remove the bandage and bathe or shower the morning after the procedure.      This is a Pain Management procedure.  You will be contacted in two weeks for follow up.    Call your Primary Care Provider if you have the following (if your primary care provider is not available please seek emergency care):   Nausea with vomiting   Severe headache   Drowsiness or confusion   Redness or drainage at the injection or puncture site   Temperature over 101 degrees F   Other concerns   Worsening back pain   Stiff neck

## 2021-09-16 ENCOUNTER — TELEPHONE (OUTPATIENT)
Dept: INTERVENTIONAL RADIOLOGY/VASCULAR | Facility: HOSPITAL | Age: 76
End: 2021-09-16

## 2021-09-16 NOTE — TELEPHONE ENCOUNTER
CONSULT PATIENT  PAIN INJECTION POST CALL    Procedure: Epidural TL L3-4  Radiologist(s): Dr. Job Goldstein  Date of Procedure: 8/25/21    The patient was not available by telephone. Left message for patient to call [phone].    Marimar Soto

## 2021-09-16 NOTE — TELEPHONE ENCOUNTER
CONSULT PATIENT  PAIN INJECTION POST CALL    Procedure:    Epidural TL L3-4  Radiologist(s):        Dr. Job Goldstein  Date of Procedure: 8/25/21    The patient had no questions or concerns.    Relief of pain from this injection    A = 90%  A- = 85%  B = 80%  B- =75%  C = 70%  C- = 65%  D = 60%  D- = 50%  F = less than 50%       Would you say this injection has been beneficial? Yes  If yes, for how long? Currently relieving almost all of his pain about 80%    Was there one injection that worked better than the other? n/a    Where is the pain? Not really any pain patient states his back is fine  Can you describe the pain?n/a  Does the pain radiate anywhere?no  If yes, where does it radiate and where does the pain stop?n/a    Is this new pain? No    Patient would not like to pursue another injection at this time.  The patient will contact IR at [phone] if that changes.      Marimar Soto

## 2021-11-22 ENCOUNTER — TELEPHONE (OUTPATIENT)
Dept: FAMILY MEDICINE | Facility: OTHER | Age: 76
End: 2021-11-22
Payer: COMMERCIAL

## 2021-11-22 NOTE — TELEPHONE ENCOUNTER
11:15 AM    Reason for Call: Phone Call    Description: Pt called inquiring about getting shoulder injections earlier than February. Please call back for further discussion.    Was an appointment offered for this call? No  If yes : Appointment type              Date    Preferred method for responding to this message: Telephone Call  What is your phone number ?[phone]    If we cannot reach you directly, may we leave a detailed response at the number you provided? Yes    Can this message wait until your PCP/provider returns, if available today? YES, provider out today    Triny Escobar

## 2021-11-23 NOTE — TELEPHONE ENCOUNTER
Please schedule pt for shoulder injections in long/30 minute spot on or after 12/9/21      Thanks  Neris Gaitan LPN

## 2021-12-10 ENCOUNTER — OFFICE VISIT (OUTPATIENT)
Dept: FAMILY MEDICINE | Facility: OTHER | Age: 76
End: 2021-12-10
Attending: FAMILY MEDICINE
Payer: COMMERCIAL

## 2021-12-10 VITALS
OXYGEN SATURATION: 97 % | SYSTOLIC BLOOD PRESSURE: 138 MMHG | BODY MASS INDEX: 24.48 KG/M2 | HEIGHT: 67 IN | WEIGHT: 156 LBS | DIASTOLIC BLOOD PRESSURE: 66 MMHG | HEART RATE: 59 BPM

## 2021-12-10 DIAGNOSIS — M19.019 ARTHRITIS OF SHOULDER: Primary | ICD-10-CM

## 2021-12-10 PROCEDURE — G0463 HOSPITAL OUTPT CLINIC VISIT: HCPCS | Mod: 25

## 2021-12-10 PROCEDURE — 20610 DRAIN/INJ JOINT/BURSA W/O US: CPT | Mod: 50 | Performed by: FAMILY MEDICINE

## 2021-12-10 RX ORDER — LIDOCAINE HYDROCHLORIDE 10 MG/ML
9 INJECTION, SOLUTION INFILTRATION; PERINEURAL ONCE
Status: COMPLETED | OUTPATIENT
Start: 2021-12-10 | End: 2021-12-10

## 2021-12-10 RX ORDER — TRIAMCINOLONE ACETONIDE 40 MG/ML
80 INJECTION, SUSPENSION INTRA-ARTICULAR; INTRAMUSCULAR ONCE
Status: COMPLETED | OUTPATIENT
Start: 2021-12-10 | End: 2021-12-10

## 2021-12-10 RX ADMIN — TRIAMCINOLONE ACETONIDE 80 MG: 40 INJECTION, SUSPENSION INTRA-ARTICULAR; INTRAMUSCULAR at 11:20

## 2021-12-10 RX ADMIN — LIDOCAINE HYDROCHLORIDE 9 ML: 10 INJECTION, SOLUTION INFILTRATION; PERINEURAL at 11:19

## 2021-12-10 ASSESSMENT — PAIN SCALES - GENERAL: PAINLEVEL: NO PAIN (0)

## 2021-12-10 ASSESSMENT — MIFFLIN-ST. JEOR: SCORE: 1391.24

## 2021-12-10 NOTE — NURSING NOTE
"Chief Complaint   Patient presents with     Trigger Point Injection       Initial /66 (BP Location: Right arm, Patient Position: Chair, Cuff Size: Adult Regular)   Pulse 59   Ht 1.702 m (5' 7\")   Wt 70.8 kg (156 lb)   SpO2 97%   BMI 24.43 kg/m   Estimated body mass index is 24.43 kg/m  as calculated from the following:    Height as of this encounter: 1.702 m (5' 7\").    Weight as of this encounter: 70.8 kg (156 lb).  Medication Reconciliation: complete\  Neris Gaitan LPN    "

## 2021-12-10 NOTE — PROGRESS NOTES
"  Assessment & Plan     1. Arthritis of shoulder  Patient tolerated injections well.  Monitor for signs of infection.  Follow-up as needed.  - Large Joint/Bursa injection and/or drainage (Shoulder, Knee)  - triamcinolone (KENALOG-40) injection 80 mg  - lidocaine 1 % injection 9 mL      Return if symptoms worsen or fail to improve.    Hodan Tapia MD  [facility] - ROSALINDA Caal is a 76 year old who presents for the following health issues     HPI     Concern - Shoulder injections   Onset: ongoing pain   Description: bilateral shoulder pain   Intensity: moderate, severe  Progression of Symptoms:  waxing and waning  Accompanying Signs & Symptoms: pain   Previous history of similar problem: hx of shoulder   Precipitating factors:        Worsened by: everyday use   Alleviating factors:        Improved by: injection   Therapies tried and outcome: injections  Last injections were 8/9/2021      Review of Systems   Constitutional, HEENT, cardiovascular, pulmonary, gi and gu systems are negative, except as otherwise noted.      Objective    /66 (BP Location: Right arm, Patient Position: Chair, Cuff Size: Adult Regular)   Pulse 59   Ht 1.702 m (5' 7\")   Wt 70.8 kg (156 lb)   SpO2 97%   BMI 24.43 kg/m    Body mass index is 24.43 kg/m .  Physical Exam   GENERAL: healthy, alert and no distress  PSYCH: mentation appears normal, affect normal/bright    Procedure: steroid injection of left shoulder using posterior approach  Indication: pain, arthritis  Consent: verbal and written consent was obtained from patient after risks and benefits are discussed  Description:  Time out procedure is completed.  Joint space is identified and marked.  Betadine prep is completed.  Ethyl chloride spray is used for topical anesthesia.  1 cc Kenalog 40mg/cc and 4 cc 1% Lidocaine without epinephrine are injected without difficulty.  Patient tolerated procedure well.    Procedure: steroid injection of " right shoulder using posterior approach  Indication: pain, arthritis  Consent: verbal and written consent was obtained from patient after risks and benefits are discussed  Description:  Time out procedure is completed.  Joint space is identified and marked.  Betadine prep is completed.  Ethyl chloride spray is used for topical anesthesia.  1 cc Kenalog 40mg/cc and 4 cc 1% Lidocaine without epinephrine are injected without difficulty.  Patient tolerated procedure well.

## 2022-01-07 DIAGNOSIS — M79.671 RIGHT FOOT PAIN: ICD-10-CM

## 2022-01-10 RX ORDER — MELOXICAM 7.5 MG/1
TABLET ORAL
Qty: 90 TABLET | Refills: 1 | Status: SHIPPED | OUTPATIENT
Start: 2022-01-10 | End: 2022-07-11

## 2022-01-10 NOTE — TELEPHONE ENCOUNTER
Meera       Last Written Prescription Date:  7/15/2021  Last Fill Quantity: 90,   # refills: 1  Last Office Visit: 12/10/2021  Future Office visit:    Next 5 appointments (look out 90 days)    Feb 10, 2022  9:45 AM  (Arrive by 9:30 AM)  SHORT with Hodan Tapia MD  [facility] ([facility] ) [address]  [place] [address]  [phone]

## 2022-01-20 DIAGNOSIS — I10 BENIGN ESSENTIAL HYPERTENSION: ICD-10-CM

## 2022-01-20 DIAGNOSIS — F41.9 ANXIETY: ICD-10-CM

## 2022-01-21 RX ORDER — METOPROLOL SUCCINATE 25 MG/1
TABLET, EXTENDED RELEASE ORAL
Qty: 90 TABLET | Refills: 2 | Status: SHIPPED | OUTPATIENT
Start: 2022-01-21 | End: 2022-10-17

## 2022-02-07 ENCOUNTER — LAB (OUTPATIENT)
Dept: LAB | Facility: OTHER | Age: 77
End: 2022-02-07
Payer: COMMERCIAL

## 2022-02-07 DIAGNOSIS — I10 BENIGN ESSENTIAL HYPERTENSION: ICD-10-CM

## 2022-02-07 DIAGNOSIS — E78.00 HYPERCHOLESTEROLEMIA: ICD-10-CM

## 2022-02-07 LAB
ALT SERPL W P-5'-P-CCNC: 33 U/L (ref 0–70)
ANION GAP SERPL CALCULATED.3IONS-SCNC: 6 MMOL/L (ref 3–14)
BUN SERPL-MCNC: 19 MG/DL (ref 7–30)
CALCIUM SERPL-MCNC: 9.3 MG/DL (ref 8.5–10.1)
CHLORIDE BLD-SCNC: 105 MMOL/L (ref 94–109)
CHOLEST SERPL-MCNC: 190 MG/DL
CO2 SERPL-SCNC: 28 MMOL/L (ref 20–32)
CREAT SERPL-MCNC: 1.17 MG/DL (ref 0.66–1.25)
FASTING STATUS PATIENT QL REPORTED: YES
GFR SERPL CREATININE-BSD FRML MDRD: 64 ML/MIN/1.73M2
GLUCOSE BLD-MCNC: 91 MG/DL (ref 70–99)
HDLC SERPL-MCNC: 49 MG/DL
HOLD SPECIMEN: NORMAL
LDLC SERPL CALC-MCNC: 117 MG/DL
NONHDLC SERPL-MCNC: 141 MG/DL
POTASSIUM BLD-SCNC: 3.9 MMOL/L (ref 3.4–5.3)
SODIUM SERPL-SCNC: 139 MMOL/L (ref 133–144)
TRIGL SERPL-MCNC: 122 MG/DL

## 2022-02-07 PROCEDURE — 80061 LIPID PANEL: CPT | Mod: ZL

## 2022-02-07 PROCEDURE — 80048 BASIC METABOLIC PNL TOTAL CA: CPT | Mod: ZL

## 2022-02-07 PROCEDURE — 36415 COLL VENOUS BLD VENIPUNCTURE: CPT | Mod: ZL

## 2022-02-07 PROCEDURE — 84460 ALANINE AMINO (ALT) (SGPT): CPT | Mod: ZL

## 2022-02-08 NOTE — PROGRESS NOTES
"  Assessment & Plan     1. Hypercholesterolemia  Labs reviewed, medication renewed.  Follow-up in six months, sooner as needed.  - simvastatin (ZOCOR) 40 MG tablet; Take 1 tablet (40 mg) by mouth At Bedtime  Dispense: 90 tablet; Refill: 3    2. Benign essential hypertension  No changes to current medications, labs reviewed.    3. Hip pain, right  Patient likes to have a few pills on hand to help with symptoms when they are really severe.  Follow-up as needed.  - acetaminophen-codeine (TYLENOL #3) 300-30 MG tablet; Take 1 tablet by mouth every 6 hours as needed for severe pain maximum 6 tablet(s) per day  Dispense: 10 tablet; Refill: 0    4. Chronic right shoulder pain  - acetaminophen-codeine (TYLENOL #3) 300-30 MG tablet; Take 1 tablet by mouth every 6 hours as needed for severe pain maximum 6 tablet(s) per day  Dispense: 10 tablet; Refill: 0       BMI:   Estimated body mass index is 25.03 kg/m  as calculated from the following:    Height as of this encounter: 1.702 m (5' 7\").    Weight as of this encounter: 72.5 kg (159 lb 12.8 oz).     Return in about 6 months (around 8/10/2022) for Chronic Disease Management, Medication review.    Hodan Tapia MD  [facility] - [place] Caal is a 76 year old who presents for the following health issues     HPI     Hyperlipidemia Follow-Up      Are you regularly taking any medication or supplement to lower your cholesterol?   Yes- Zocor    Are you having muscle aches or other side effects that you think could be caused by your cholesterol lowering medication?  No    Hypertension Follow-up      Do you check your blood pressure regularly outside of the clinic? Yes     Are you following a low salt diet? Yes    Are your blood pressures ever more than 140 on the top number (systolic) OR more   than 90 on the bottom number (diastolic), for example 140/90? No      How many servings of fruits and vegetables do you eat daily?  2-3    On average, how " "many sweetened beverages do you drink each day (Examples: soda, juice, sweet tea, etc.  Do NOT count diet or artificially sweetened beverages)?   2    How many days per week do you exercise enough to make your heart beat faster? 7    How many minutes a day do you exercise enough to make your heart beat faster? 30 - 60    How many days per week do you miss taking your medication? 0      Review of Systems   Constitutional, HEENT, cardiovascular, pulmonary, gi and gu systems are negative, except as otherwise noted.      Objective    /76 (BP Location: Left arm, Patient Position: Sitting, Cuff Size: Adult Regular)   Pulse 58   Temp 98.3  F (36.8  C) (Tympanic)   Resp 16   Ht 1.702 m (5' 7\")   Wt 72.5 kg (159 lb 12.8 oz)   SpO2 98%   BMI 25.03 kg/m    Body mass index is 25.03 kg/m .  Physical Exam   GENERAL: healthy, alert and no distress  PSYCH: mentation appears normal, affect normal/bright    Lab on 02/07/2022   Component Date Value Ref Range Status     ALT 02/07/2022 33  0 - 70 U/L Final     Cholesterol 02/07/2022 190  <200 mg/dL Final     Triglycerides 02/07/2022 122  <150 mg/dL Final     Direct Measure HDL 02/07/2022 49  >=40 mg/dL Final     LDL Cholesterol Calculated 02/07/2022 117* <=100 mg/dL Final     Non HDL Cholesterol 02/07/2022 141* <130 mg/dL Final     Patient Fasting > 8hrs? 02/07/2022 Yes   Final     Sodium 02/07/2022 139  133 - 144 mmol/L Final     Potassium 02/07/2022 3.9  3.4 - 5.3 mmol/L Final     Chloride 02/07/2022 105  94 - 109 mmol/L Final     Carbon Dioxide (CO2) 02/07/2022 28  20 - 32 mmol/L Final     Anion Gap 02/07/2022 6  3 - 14 mmol/L Final     Urea Nitrogen 02/07/2022 19  7 - 30 mg/dL Final     Creatinine 02/07/2022 1.17  0.66 - 1.25 mg/dL Final     Calcium 02/07/2022 9.3  8.5 - 10.1 mg/dL Final     Glucose 02/07/2022 91  70 - 99 mg/dL Final     GFR Estimate 02/07/2022 64  >60 mL/min/1.73m2 Final    Effective December 21, 2021 eGFRcr in adults is calculated using the 2021 " CKD-EPI creatinine equation which includes age and gender (Tre kenny al., NEJM, DOI: 10.1056/VRLRre5524938)     Hold Specimen 02/07/2022 [facility]   Final

## 2022-02-10 ENCOUNTER — OFFICE VISIT (OUTPATIENT)
Dept: FAMILY MEDICINE | Facility: OTHER | Age: 77
End: 2022-02-10
Attending: FAMILY MEDICINE
Payer: COMMERCIAL

## 2022-02-10 VITALS
HEART RATE: 58 BPM | TEMPERATURE: 98.3 F | BODY MASS INDEX: 25.08 KG/M2 | DIASTOLIC BLOOD PRESSURE: 76 MMHG | SYSTOLIC BLOOD PRESSURE: 138 MMHG | RESPIRATION RATE: 16 BRPM | OXYGEN SATURATION: 98 % | HEIGHT: 67 IN | WEIGHT: 159.8 LBS

## 2022-02-10 DIAGNOSIS — M25.511 CHRONIC RIGHT SHOULDER PAIN: ICD-10-CM

## 2022-02-10 DIAGNOSIS — E78.00 HYPERCHOLESTEROLEMIA: Primary | ICD-10-CM

## 2022-02-10 DIAGNOSIS — M25.551 HIP PAIN, RIGHT: ICD-10-CM

## 2022-02-10 DIAGNOSIS — I10 BENIGN ESSENTIAL HYPERTENSION: ICD-10-CM

## 2022-02-10 DIAGNOSIS — G89.29 CHRONIC RIGHT SHOULDER PAIN: ICD-10-CM

## 2022-02-10 PROCEDURE — 99214 OFFICE O/P EST MOD 30 MIN: CPT | Performed by: FAMILY MEDICINE

## 2022-02-10 RX ORDER — SIMVASTATIN 40 MG
40 TABLET ORAL AT BEDTIME
Qty: 90 TABLET | Refills: 3 | Status: SHIPPED | OUTPATIENT
Start: 2022-02-10 | End: 2023-04-28

## 2022-02-10 RX ORDER — ACETAMINOPHEN 500 MG
1000 TABLET ORAL EVERY 8 HOURS PRN
Qty: 100 TABLET | Refills: 1 | Status: CANCELLED | OUTPATIENT
Start: 2022-02-10

## 2022-02-10 ASSESSMENT — ANXIETY QUESTIONNAIRES
6. BECOMING EASILY ANNOYED OR IRRITABLE: NOT AT ALL
5. BEING SO RESTLESS THAT IT IS HARD TO SIT STILL: NOT AT ALL
7. FEELING AFRAID AS IF SOMETHING AWFUL MIGHT HAPPEN: NOT AT ALL
3. WORRYING TOO MUCH ABOUT DIFFERENT THINGS: NOT AT ALL
1. FEELING NERVOUS, ANXIOUS, OR ON EDGE: NOT AT ALL
IF YOU CHECKED OFF ANY PROBLEMS ON THIS QUESTIONNAIRE, HOW DIFFICULT HAVE THESE PROBLEMS MADE IT FOR YOU TO DO YOUR WORK, TAKE CARE OF THINGS AT HOME, OR GET ALONG WITH OTHER PEOPLE: NOT DIFFICULT AT ALL
GAD7 TOTAL SCORE: 0
2. NOT BEING ABLE TO STOP OR CONTROL WORRYING: NOT AT ALL
4. TROUBLE RELAXING: NOT AT ALL

## 2022-02-10 ASSESSMENT — MIFFLIN-ST. JEOR: SCORE: 1408.48

## 2022-02-10 ASSESSMENT — PAIN SCALES - GENERAL: PAINLEVEL: NO PAIN (0)

## 2022-02-10 ASSESSMENT — PATIENT HEALTH QUESTIONNAIRE - PHQ9: SUM OF ALL RESPONSES TO PHQ QUESTIONS 1-9: 2

## 2022-02-10 NOTE — NURSING NOTE
"Chief Complaint   Patient presents with     Derm Problem       Initial /76 (BP Location: Left arm, Patient Position: Sitting, Cuff Size: Adult Regular)   Pulse 58   Temp 98.3  F (36.8  C) (Tympanic)   Resp 16   Ht 1.702 m (5' 7\")   Wt 72.5 kg (159 lb 12.8 oz)   SpO2 98%   BMI 25.03 kg/m   Estimated body mass index is 25.03 kg/m  as calculated from the following:    Height as of this encounter: 1.702 m (5' 7\").    Weight as of this encounter: 72.5 kg (159 lb 12.8 oz).  Medication Reconciliation: complete  Arianna Muller MA  "

## 2022-02-11 ASSESSMENT — ANXIETY QUESTIONNAIRES: GAD7 TOTAL SCORE: 0

## 2022-07-08 NOTE — PROGRESS NOTES
"  Assessment & Plan     1. Arthritis of shoulder  Patient tolerated injections well, monitor for signs of infection.  Follow-up as needed.  - Large Joint/Bursa injection and/or drainage (Shoulder, Knee)  - triamcinolone (KENALOG-40) injection 80 mg  - lidocaine 1 % injection 8 mL       BMI:   Estimated body mass index is 25.26 kg/m  as calculated from the following:    Height as of 2/10/22: 1.702 m (5' 7\").    Weight as of this encounter: 73.2 kg (161 lb 4.8 oz). \    Return if symptoms worsen or fail to improve.    Hodan Tapia MD  [facility] - ROSALINDA Caal is a 77 year old, presenting for the following health issues:  Imm/Inj      HPI     Pain History:  When did you first notice your pain? - Chronic Pain   Have you seen this provider for your pain in the past?   Yes   Where in your body do you have pain? Bilateral shoulders  Are you seeing anyone else for your pain? No  Patient requests steroid injections today.  Last injections were 12/10/2021    PHQ-9 SCORE 10/15/2020 8/9/2021 2/10/2022   PHQ-9 Total Score 9 3 2       NAVYA-7 SCORE 10/15/2020 8/9/2021 2/10/2022   Total Score 9 0 0         Chronic Pain Follow Up:    Location of pain: Both shoulders  Analgesia/pain control:    - Recent changes:  no    - Overall control: Tolerable with discomfort    - Current treatments: injections   Adherence:     - Do you ever take more pain medicine than prescribed? No    - When did you take your last dose of pain medicine?  Meloxicam taken last night   Adverse effects: No   PDMP Review       Value Time User    State PDMP site checked  Yes 7/11/2022 11:01 AM Hodan Tapia MD        Last CSA Agreement:   CSA -- Patient Level:    CSA: None found at the patient level.       Last UDS:       Review of Systems   Constitutional, HEENT, cardiovascular, pulmonary, gi and gu systems are negative, except as otherwise noted.      Objective    BP (!) 144/68 (BP Location: Right arm, Patient Position: " Chair, Cuff Size: Adult Regular)   Pulse 58   Temp 98.3  F (36.8  C) (Tympanic)   Wt 73.2 kg (161 lb 4.8 oz)   SpO2 97%   BMI 25.26 kg/m    Body mass index is 25.26 kg/m .  Physical Exam   GENERAL: healthy, alert and no distress  PSYCH: mentation appears normal, affect normal/bright    Procedure: steroid injection of left shoulder using posterior approach  Indication: pain, arthritis  Consent: verbal and written consent was obtained from patient after risks and benefits are discussed  Description:  Time out procedure is completed.  Joint space is identified and marked.  Betadine prep is completed.  Ethyl chloride spray is used for topical anesthesia.  1 cc Kenalog 40mg/cc and 4 cc 1% Lidocaine without epinephrine are injected without difficulty.  Patient tolerated procedure well.    Procedure: steroid injection of right shoulder using posterior approach  Indication: pain, arthritis  Consent: verbal and written consent was obtained from patient after risks and benefits are discussed  Description:  Time out procedure is completed.  Joint space is identified and marked.  Betadine prep is completed.  Ethyl chloride spray is used for topical anesthesia.  1 cc Kenalog 40mg/cc and 4 cc 1% Lidocaine without epinephrine are injected without difficulty.  Patient tolerated procedure well.                .  ..

## 2022-07-09 DIAGNOSIS — M79.671 RIGHT FOOT PAIN: ICD-10-CM

## 2022-07-11 ENCOUNTER — OFFICE VISIT (OUTPATIENT)
Dept: FAMILY MEDICINE | Facility: OTHER | Age: 77
End: 2022-07-11
Attending: FAMILY MEDICINE
Payer: COMMERCIAL

## 2022-07-11 VITALS
DIASTOLIC BLOOD PRESSURE: 68 MMHG | HEART RATE: 58 BPM | TEMPERATURE: 98.3 F | WEIGHT: 161.3 LBS | BODY MASS INDEX: 25.26 KG/M2 | OXYGEN SATURATION: 97 % | SYSTOLIC BLOOD PRESSURE: 144 MMHG

## 2022-07-11 DIAGNOSIS — M19.019 ARTHRITIS OF SHOULDER: Primary | ICD-10-CM

## 2022-07-11 PROCEDURE — 20610 DRAIN/INJ JOINT/BURSA W/O US: CPT | Mod: 50 | Performed by: FAMILY MEDICINE

## 2022-07-11 PROCEDURE — G0463 HOSPITAL OUTPT CLINIC VISIT: HCPCS

## 2022-07-11 RX ORDER — TRIAMCINOLONE ACETONIDE 40 MG/ML
80 INJECTION, SUSPENSION INTRA-ARTICULAR; INTRAMUSCULAR ONCE
Status: COMPLETED | OUTPATIENT
Start: 2022-07-11 | End: 2022-07-11

## 2022-07-11 RX ORDER — LIDOCAINE HYDROCHLORIDE 10 MG/ML
8 INJECTION, SOLUTION INFILTRATION; PERINEURAL ONCE
Status: COMPLETED | OUTPATIENT
Start: 2022-07-11 | End: 2022-07-11

## 2022-07-11 RX ORDER — MELOXICAM 7.5 MG/1
TABLET ORAL
Qty: 90 TABLET | Refills: 1 | Status: SHIPPED | OUTPATIENT
Start: 2022-07-11 | End: 2023-01-04

## 2022-07-11 RX ADMIN — TRIAMCINOLONE ACETONIDE 80 MG: 40 INJECTION, SUSPENSION INTRA-ARTICULAR; INTRAMUSCULAR at 11:26

## 2022-07-11 RX ADMIN — LIDOCAINE HYDROCHLORIDE 8 ML: 10 INJECTION, SOLUTION INFILTRATION; PERINEURAL at 11:24

## 2022-07-11 ASSESSMENT — PAIN SCALES - GENERAL: PAINLEVEL: SEVERE PAIN (6)

## 2022-07-11 NOTE — TELEPHONE ENCOUNTER
rupinder      Last Written Prescription Date:  1/10/22  Last Fill Quantity: 90,   # refills: 1  Last Office Visit: 7/11/22  Future Office visit:    Next 5 appointments (look out 90 days)    Jul 26, 2022 10:15 AM  (Arrive by 10:00 AM)  Nurse Only with Lesly Cabello  [facility] [place] ([facility] [place] ) [address]  [place] [address]  [phone]   Aug 11, 2022  8:30 AM  (Arrive by 8:15 AM)  SHORT with Hodan Tapia MD  [facility] ([facility] ) [address]  [place] [address]  [phone]

## 2022-07-11 NOTE — NURSING NOTE
"Chief Complaint   Patient presents with     Imm/Inj       Initial BP (!) 144/68 (BP Location: Right arm, Patient Position: Chair, Cuff Size: Adult Regular)   Pulse 58   Temp 98.3  F (36.8  C) (Tympanic)   Wt 73.2 kg (161 lb 4.8 oz)   SpO2 97%   BMI 25.26 kg/m   Estimated body mass index is 25.26 kg/m  as calculated from the following:    Height as of 2/10/22: 1.702 m (5' 7\").    Weight as of this encounter: 73.2 kg (161 lb 4.8 oz).  Medication Reconciliation: complete  Shayna Hernandez    "

## 2022-07-19 DIAGNOSIS — H91.93 DECREASED HEARING OF BOTH EARS: Primary | ICD-10-CM

## 2022-07-21 ENCOUNTER — TELEPHONE (OUTPATIENT)
Dept: FAMILY MEDICINE | Facility: OTHER | Age: 77
End: 2022-07-21

## 2022-07-21 NOTE — TELEPHONE ENCOUNTER
Patient in to [facility] on 7/21/22 reporting bilateral shoulder joint/bursa cortisone injections by Dr. Goodman on 7/11/22.    Patient with bruising noted on left upper arm, anterior aspect measuring 13 cm L x 9 cm W. Skin remains in tact with no signs of swelling or redness noted. Upon assessment, no changes in temperature of skin. Patient denies fever.     Patient reporting pain was moderate to severe to follow the injection as patient states he was unable to lay on left side due to the pain. Pain is reported as very mild on 7/21/22, which has been improving.     Spoke with Amber Winkler provided updated. No signs of infection and/or cellulitis. Patient will continue to monitor the area and return call if any swelling, redness of change in temperature of skin is noted and/or if patient develops a fever or any symptoms of concern, patient verbalized understanding.

## 2022-07-26 ENCOUNTER — ALLIED HEALTH/NURSE VISIT (OUTPATIENT)
Dept: AUDIOLOGY | Facility: OTHER | Age: 77
End: 2022-07-26
Attending: AUDIOLOGIST
Payer: COMMERCIAL

## 2022-07-26 DIAGNOSIS — H91.93 DECREASED HEARING OF BOTH EARS: Primary | ICD-10-CM

## 2022-07-26 DIAGNOSIS — H90.3 SENSORINEURAL HEARING LOSS (SNHL) OF BOTH EARS: Primary | ICD-10-CM

## 2022-07-26 DIAGNOSIS — H91.93 DECREASED HEARING OF BOTH EARS: ICD-10-CM

## 2022-07-26 PROCEDURE — 92556 SPEECH AUDIOMETRY COMPLETE: CPT | Performed by: AUDIOLOGIST

## 2022-07-26 PROCEDURE — V5299 HEARING SERVICE: HCPCS

## 2022-07-26 PROCEDURE — 92552 PURE TONE AUDIOMETRY AIR: CPT | Performed by: AUDIOLOGIST

## 2022-07-26 NOTE — PROGRESS NOTES
Audiology Evaluation Completed. Please refer SCANNED AUDIOGRAM and/or TYMPANOGRAM for BACKGROUND, RESULTS, RECOMMENDATIONS.      Sadie FAIRBANKS, [facility]  Audiologist #[id number]

## 2022-07-26 NOTE — PROGRESS NOTES
HEARING AID CHECK    BACKGROUND:  Ten Flores, a 77 year old male, was seen today for an hearing aid check.  Mr. Flores wears Binaural Oticon OPN 3 miniRITE hearing aids.  Mr. Flores reported no concerns.    FINDINGS:  Visual inspection and cleaning provided.   C-stop changed with new. 10mm SV domes changed with new. Battery was tested and good. Good listening check.    Reviewed cleaning, troubleshooting, and preventative care with patient. Any cleaning tools used were provided as customer courtesy.    Services performed and warranty reviewed with patient.    PLAN:  Based on patient report, no audiology appointment for adjustments needed.Mr. Flores will return to clinic in 12 months, sooner if needed. Patient had no further questions and reports satisfaction.         Kavitha Cabello  Audiology Assistant  [facility]  [phone]

## 2022-08-08 ENCOUNTER — LAB (OUTPATIENT)
Dept: LAB | Facility: OTHER | Age: 77
End: 2022-08-08
Payer: COMMERCIAL

## 2022-08-08 DIAGNOSIS — E78.00 HYPERCHOLESTEROLEMIA: ICD-10-CM

## 2022-08-08 DIAGNOSIS — I10 BENIGN ESSENTIAL HYPERTENSION: ICD-10-CM

## 2022-08-08 DIAGNOSIS — Z12.5 SCREENING FOR MALIGNANT NEOPLASM OF PROSTATE: ICD-10-CM

## 2022-08-08 DIAGNOSIS — E78.00 HYPERCHOLESTEROLEMIA: Primary | ICD-10-CM

## 2022-08-08 LAB
ALT SERPL W P-5'-P-CCNC: 31 U/L (ref 0–70)
ANION GAP SERPL CALCULATED.3IONS-SCNC: 5 MMOL/L (ref 3–14)
BUN SERPL-MCNC: 20 MG/DL (ref 7–30)
CALCIUM SERPL-MCNC: 9.2 MG/DL (ref 8.5–10.1)
CHLORIDE BLD-SCNC: 106 MMOL/L (ref 94–109)
CHOLEST SERPL-MCNC: 179 MG/DL
CO2 SERPL-SCNC: 27 MMOL/L (ref 20–32)
CREAT SERPL-MCNC: 1.08 MG/DL (ref 0.66–1.25)
FASTING STATUS PATIENT QL REPORTED: YES
GFR SERPL CREATININE-BSD FRML MDRD: 70 ML/MIN/1.73M2
GLUCOSE BLD-MCNC: 102 MG/DL (ref 70–99)
HDLC SERPL-MCNC: 53 MG/DL
HOLD SPECIMEN: NORMAL
HOLD SPECIMEN: NORMAL
LDLC SERPL CALC-MCNC: 104 MG/DL
NONHDLC SERPL-MCNC: 126 MG/DL
POTASSIUM BLD-SCNC: 4.1 MMOL/L (ref 3.4–5.3)
PSA SERPL-MCNC: 2.06 UG/L (ref 0–4)
SODIUM SERPL-SCNC: 138 MMOL/L (ref 133–144)
TRIGL SERPL-MCNC: 109 MG/DL

## 2022-08-08 PROCEDURE — 80048 BASIC METABOLIC PNL TOTAL CA: CPT | Mod: ZL

## 2022-08-08 PROCEDURE — 80061 LIPID PANEL: CPT | Mod: ZL

## 2022-08-08 PROCEDURE — 84460 ALANINE AMINO (ALT) (SGPT): CPT | Mod: ZL

## 2022-08-08 PROCEDURE — G0103 PSA SCREENING: HCPCS | Mod: ZL

## 2022-08-08 PROCEDURE — 36415 COLL VENOUS BLD VENIPUNCTURE: CPT | Mod: ZL

## 2022-08-11 ENCOUNTER — OFFICE VISIT (OUTPATIENT)
Dept: FAMILY MEDICINE | Facility: OTHER | Age: 77
End: 2022-08-11
Attending: FAMILY MEDICINE
Payer: COMMERCIAL

## 2022-08-11 VITALS
OXYGEN SATURATION: 95 % | WEIGHT: 157.3 LBS | RESPIRATION RATE: 16 BRPM | HEIGHT: 67 IN | BODY MASS INDEX: 24.69 KG/M2 | TEMPERATURE: 98.2 F | SYSTOLIC BLOOD PRESSURE: 126 MMHG | DIASTOLIC BLOOD PRESSURE: 74 MMHG | HEART RATE: 68 BPM

## 2022-08-11 DIAGNOSIS — G89.29 CHRONIC RIGHT SHOULDER PAIN: ICD-10-CM

## 2022-08-11 DIAGNOSIS — E78.00 HYPERCHOLESTEROLEMIA: Primary | ICD-10-CM

## 2022-08-11 DIAGNOSIS — I10 BENIGN ESSENTIAL HYPERTENSION: ICD-10-CM

## 2022-08-11 DIAGNOSIS — M47.817 SPONDYLOSIS OF LUMBOSACRAL REGION WITHOUT MYELOPATHY OR RADICULOPATHY: ICD-10-CM

## 2022-08-11 DIAGNOSIS — M25.551 HIP PAIN, RIGHT: ICD-10-CM

## 2022-08-11 DIAGNOSIS — M25.511 CHRONIC RIGHT SHOULDER PAIN: ICD-10-CM

## 2022-08-11 PROCEDURE — 99214 OFFICE O/P EST MOD 30 MIN: CPT | Performed by: FAMILY MEDICINE

## 2022-08-11 PROCEDURE — G0463 HOSPITAL OUTPT CLINIC VISIT: HCPCS

## 2022-08-11 ASSESSMENT — ANXIETY QUESTIONNAIRES
IF YOU CHECKED OFF ANY PROBLEMS ON THIS QUESTIONNAIRE, HOW DIFFICULT HAVE THESE PROBLEMS MADE IT FOR YOU TO DO YOUR WORK, TAKE CARE OF THINGS AT HOME, OR GET ALONG WITH OTHER PEOPLE: NOT DIFFICULT AT ALL
3. WORRYING TOO MUCH ABOUT DIFFERENT THINGS: NOT AT ALL
6. BECOMING EASILY ANNOYED OR IRRITABLE: NOT AT ALL
GAD7 TOTAL SCORE: 0
4. TROUBLE RELAXING: NOT AT ALL
GAD7 TOTAL SCORE: 0
5. BEING SO RESTLESS THAT IT IS HARD TO SIT STILL: NOT AT ALL
2. NOT BEING ABLE TO STOP OR CONTROL WORRYING: NOT AT ALL
1. FEELING NERVOUS, ANXIOUS, OR ON EDGE: NOT AT ALL
7. FEELING AFRAID AS IF SOMETHING AWFUL MIGHT HAPPEN: NOT AT ALL

## 2022-08-11 ASSESSMENT — PATIENT HEALTH QUESTIONNAIRE - PHQ9: SUM OF ALL RESPONSES TO PHQ QUESTIONS 1-9: 0

## 2022-08-11 ASSESSMENT — PAIN SCALES - GENERAL: PAINLEVEL: MILD PAIN (2)

## 2022-08-11 NOTE — LETTER
Opioid / Opioid Plus Controlled Substance Agreement    This is an agreement between you and your provider about the safe and appropriate use of controlled substance/opioids prescribed by your care team. Controlled substances are medicines that can cause physical and mental dependence (abuse).    There are strict laws about having and using these medicines. We here at [facility] are committing to working with you in your efforts to get better. To support you in this work, we ll help you schedule regular office appointments for medicine refills. If we must cancel or change your appointment for any reason, we ll make sure you have enough medicine to last until your next appointment.     As a Provider, I will:    Listen carefully to your concerns and treat you with respect.     Recommend a treatment plan that I believe is in your best interest. This plan may involve therapies other than opioid pain medication.     Talk with you often about the possible benefits, and the risk of harm of any medicine that we prescribe for you.     Provide a plan on how to taper (discontinue or go off) using this medicine if the decision is made to stop its use.    As a Patient, I understand that opioid(s):     Are a controlled substance prescribed by my care team to help me function or work and manage my condition(s).     Are strong medicines and can cause serious side effects such as:    Drowsiness, which can seriously affect my driving ability    A lower breathing rate, enough to cause death    Harm to my thinking ability     Depression     Abuse of and addiction to this medicine    Need to be taken exactly as prescribed. Combining opioids with certain medicines or chemicals (such as illegal drugs, sedatives, sleeping pills, and benzodiazepines) can be dangerous or even fatal. If I stop opioids suddenly, I may have severe withdrawal symptoms.    Do not work for all types of pain nor for all patients. If they re not helpful, I may  be asked to stop them.    {Benzo / Stimulant (Optional):414348}    The risks, benefits and side effects of these medicine(s) were explained to me. I agree that:  1. I will take part in other treatments as advised by my care team. This may be psychiatry or counseling, physical therapy, behavioral therapy, group treatment or a referral to a specialist.     2. I will keep all my appointments. I understand that this is part of the monitoring of opioids. My care team may require an office visit for EVERY opioid/controlled substance refill. If I miss appointments or don t follow instructions, my care team may stop my medicine.    3. I will take my medicines as prescribed. I will not change the dose or schedule unless my care team tells me to. There will be no refills if I run out early.     4. I may be asked to come to the clinic and complete a urine drug test or complete a pill count at any time. If I don t give a urine sample or participate in a pill count, the care team may stop my medicine.    5. I will only receive prescriptions from this clinic for chronic pain. If I am treated by another provider for acute pain issues, I will tell them that I am taking opioid pain medication for chronic pain and that I have a treatment agreement with this provider. I will inform my [facility] care team within one business day if I am given a prescription for any pain medication by another healthcare provider. My [facility] care team can contact other providers and pharmacists about my use of any medicines.    6. It is up to me to make sure that I don t run out of my medicines on weekends or holidays. If my care team is willing to refill my opioid prescription without a visit, I must request refills only during office hours. Refills may take up to 3 business days to process. I will use one pharmacy to fill all my opioid and other controlled substance prescriptions. I will notify the clinic about any changes to my  insurance or medication availability.    7. I am responsible for my prescriptions. If the medicine/prescription is lost, stolen or destroyed, it will not be replaced. I also agree not to share controlled substance medicines with anyone.    8. I am aware I should not use any illegal or recreational drugs. I agree not to drink alcohol unless my care team says I can.       9. If I enroll in the [place] Medical Cannabis program, I will tell my care team prior to my next refill.     10. I will tell my care team right away if I become pregnant, have a new medical problem treated outside of my regular clinic, or have a change in my medications.    11. I understand that this medicine can affect my thinking, judgment and reaction time. Alcohol and drugs affect the brain and body, which can affect the safety of my driving. Being under the influence of alcohol or drugs can affect my decision-making, behaviors, personal safety, and the safety of others. Driving while impaired (DWI) can occur if a person is driving, operating, or in physical control of a car, motorcycle, boat, snowmobile, ATV, motorbike, off-road vehicle, or any other motor vehicle ([place] Statute 169A.20). I understand the risk if I choose to drive or operate any vehicle or machinery.    I understand that if I do not follow any of the conditions above, my prescriptions or treatment may be stopped or changed.          Opioids  What You Need to Know    What are opioids?   Opioids are pain medicines that must be prescribed by a doctor. They are also known as narcotics.     Examples are:   1. morphine (MS Contin, Sanam)  2. oxycodone (Oxycontin)  3. oxycodone and acetaminophen (Percocet)  4. hydrocodone and acetaminophen (Vicodin, Norco)   5. fentanyl patch (Duragesic)   6. hydromorphone (Dilaudid)   7. methadone  8. codeine (Tylenol #3)     What do opioids do well?   Opioids are best for severe short-term pain such as after a surgery or injury. They may work well  for cancer pain. They may help some people with long-lasting (chronic) pain.     What do opioids NOT do well?   Opioids never get rid of pain entirely, and they don t work well for most patients with chronic pain. Opioids don t reduce swelling, one of the causes of pain.                                    Other ways to manage chronic pain and improve function include:       Treat the health problem that may be causing pain    Anti-inflammation medicines, which reduce swelling and tenderness, such as ibuprofen (Advil, Motrin) or naproxen (Aleve)    Acetaminophen (Tylenol)    Antidepressants and anti-seizure medicines, especially for nerve pain    Topical treatments such as patches or creams    Injections or nerve blocks    Chiropractic or osteopathic treatment    Acupuncture, massage, deep breathing, meditation, visual imagery, aromatherapy    Use heat or ice at the pain site    Physical therapy     Exercise    Stop smoking    Take part in therapy       Risks and side effects     Talk to your doctor before you start or decide to keep taking opioids. Possible side effects include:      Lowering your breathing rate enough to cause death    Overdose, including death, especially if taking higher than prescribed doses    Worse depression symptoms; less pleasure in things you usually enjoy    Feeling tired or sluggish    Slower thoughts or cloudy thinking    Being more sensitive to pain over time; pain is harder to control    Trouble sleeping or restless sleep    Changes in hormone levels (for example, less testosterone)    Changes in sex drive or ability to have sex    Constipation    Unsafe driving    Itching and sweating    Dizziness    Nausea, throwing up and dry mouth    What else should I know about opioids?    Opioids may lead to dependence, tolerance, or addiction.      Dependence means that if you stop or reduce the medicine too quickly, you will have withdrawal symptoms. These include loose poop (diarrhea),  jitters, flu-like symptoms, nervousness and tremors. Dependence is not the same as addiction.                       Tolerance means needing higher doses over time to get the same effect. This may increase the chance of serious side effects.      Addiction is when people improperly use a substance that harms their body, their mind or their relations with others. Use of opiates can cause a relapse of addiction if you have a history of drug or alcohol abuse.      People who have used opioids for a long time may have a lower quality of life, worse depression, higher levels of pain and more visits to doctors.    You can overdose on opioids. Take these steps to lower your risk of overdose:    1. Recognize the signs:  Signs of overdose include decrease or loss of consciousness (blackout), slowed breathing, trouble waking up and blue lips. If someone is worried about overdose, they should call 911.    2. Talk to your doctor about Narcan (naloxone).   If you are at risk for overdose, you may be given a prescription for Narcan. This medicine very quickly reverses the effects of opioids.   If you overdose, a friend or family member can give you Narcan while waiting for the ambulance. They need to know the signs of overdose and how to give Narcan.     3. Don't use alcohol or street drugs.   Taking them with opioids can cause death.    4. Do not take any of these medicines unless your doctor says it s OK. Taking these with opioids can cause death:    Benzodiazepines, such as lorazepam (Ativan), alprazolam (Xanax) or diazepam (Valium)    Muscle relaxers, such as cyclobenzaprine (Flexeril)    Sleeping pills like zolpidem (Ambien)     Other opioids      How to keep you and other people safe while taking opioids:    1. Never share your opioids with others.  Opioid medicines are regulated by the Drug Enforcement Agency (LUIS). Selling or sharing medications is a criminal act.    2. Be sure to store opioids in a secure place, locked up  if possible. Young children can easily swallow them and overdose.    3. When you are traveling with your medicines, keep them in the original bottles. If you use a pill box, be sure you also carry a copy of your medicine list from your clinic or pharmacy.    4. Safe disposal of opioids    Most pharmacies have places to get rid of medicine, called disposal kiosks. Medicine disposal options are also available in every [facility]. Search your county and  medication disposal  to find more options. You can find more details at:  https://www.pca.[facility] [facility].[place]./living-green/managing-unwanted-medications     I agree that my provider, clinic care team, and pharmacy may work with any city, state or federal law enforcement agency that investigates the misuse, sale, or other diversion of my controlled medicine. I will allow my provider to discuss my care with, or share a copy of, this agreement with any other treating provider, pharmacy or emergency room where I receive care.    I have read this agreement and have asked questions about anything I did not understand.    _______________________________________________________  Patient Signature - Ten Flores _____________________                   Date     _______________________________________________________  Provider Signature - Hodan Tapia MD   _____________________                   Date     _______________________________________________________  Witness Signature (required if provider not present while patient signing)   _____________________                   Date

## 2022-08-11 NOTE — NURSING NOTE
"Chief Complaint   Patient presents with     Hypertension     Lipids     Musculoskeletal Problem       Initial /74 (BP Location: Right arm, Patient Position: Sitting, Cuff Size: Adult Regular)   Pulse 68   Temp 98.2  F (36.8  C) (Tympanic)   Resp 16   Ht 1.702 m (5' 7\")   Wt 71.4 kg (157 lb 4.8 oz)   SpO2 95%   BMI 24.64 kg/m   Estimated body mass index is 24.64 kg/m  as calculated from the following:    Height as of this encounter: 1.702 m (5' 7\").    Weight as of this encounter: 71.4 kg (157 lb 4.8 oz).  Medication Reconciliation: complete  Arianna Muller MA  "

## 2022-08-11 NOTE — PROGRESS NOTES
Assessment & Plan     1. Hypercholesterolemia  Labs reviewed, no medication changes needed.  Follow-up in six months, sooner as needed.  Future labs will be ordered.    2. Benign essential hypertension  As above.    3. Spondylosis of lumbosacral region without myelopathy or radiculopathy  No changes, patient uses T#3 rarely, new script sent.        Return in about 6 months (around 2/11/2023) for Chronic Disease Management, Medication review.    Hodan Tapia MD  [facility] - ROSALINDA Caal is a 77 year old presenting for the following health issues:  Hypertension, Lipids, and Musculoskeletal Problem      HPI     Hyperlipidemia Follow-Up      Are you regularly taking any medication or supplement to lower your cholesterol?   Yes- Zocor    Are you having muscle aches or other side effects that you think could be caused by your cholesterol lowering medication?  No    Hypertension Follow-up      Do you check your blood pressure regularly outside of the clinic? No     Are you following a low salt diet? Yes    Are your blood pressures ever more than 140 on the top number (systolic) OR more   than 90 on the bottom number (diastolic), for example 140/90? No    Pain History:  When did you first notice your pain? - Chronic Pain   Have you seen this provider for your pain in the past?   Yes   Where in your body do you have pain? back  Are you seeing anyone else for your pain? No    PHQ-9 SCORE 8/9/2021 2/10/2022 8/11/2022   PHQ-9 Total Score 3 2 0       NAVYA-7 SCORE 8/9/2021 2/10/2022 8/11/2022   Total Score 0 0 0         Chronic Pain Follow Up:    Location of pain: low back  Analgesia/pain control:    - Recent changes:  none    - Overall control: Comfortably manageable    - Current treatments: back injections and  Tylenol #3  Adherence:     - Do you ever take more pain medicine than prescribed? No    - When did you take your last dose of pain medicine?  A few months   Adverse effects: No  "  PDMP Review       Value Time User    State PDMP site checked  Yes 7/11/2022 11:01 AM Hodan Tapia MD        Last CSA Agreement:   CSA -- Patient Level:    CSA: None found at the patient level.       Last UDS:       Review of Systems   Constitutional, HEENT, cardiovascular, pulmonary, gi and gu systems are negative, except as otherwise noted.      Objective    /74 (BP Location: Right arm, Patient Position: Sitting, Cuff Size: Adult Regular)   Pulse 68   Temp 98.2  F (36.8  C) (Tympanic)   Resp 16   Ht 1.702 m (5' 7\")   Wt 71.4 kg (157 lb 4.8 oz)   SpO2 95%   BMI 24.64 kg/m    Body mass index is 24.64 kg/m .  Physical Exam   GENERAL: healthy, alert and no distress  PSYCH: mentation appears normal, affect normal/bright    Lab on 08/08/2022   Component Date Value Ref Range Status     Sodium 08/08/2022 138  133 - 144 mmol/L Final     Potassium 08/08/2022 4.1  3.4 - 5.3 mmol/L Final     Chloride 08/08/2022 106  94 - 109 mmol/L Final     Carbon Dioxide (CO2) 08/08/2022 27  20 - 32 mmol/L Final     Anion Gap 08/08/2022 5  3 - 14 mmol/L Final     Urea Nitrogen 08/08/2022 20  7 - 30 mg/dL Final     Creatinine 08/08/2022 1.08  0.66 - 1.25 mg/dL Final     Calcium 08/08/2022 9.2  8.5 - 10.1 mg/dL Final     Glucose 08/08/2022 102 (A) 70 - 99 mg/dL Final     GFR Estimate 08/08/2022 70  >60 mL/min/1.73m2 Final    Effective December 21, 2021 eGFRcr in adults is calculated using the 2021 CKD-EPI creatinine equation which includes age and gender (Tre et al., NEJM, DOI: 10.1056/ZYSTwh4060771)     Cholesterol 08/08/2022 179  <200 mg/dL Final     Triglycerides 08/08/2022 109  <150 mg/dL Final     Direct Measure HDL 08/08/2022 53  >=40 mg/dL Final     LDL Cholesterol Calculated 08/08/2022 104 (A) <=100 mg/dL Final     Non HDL Cholesterol 08/08/2022 126  <130 mg/dL Final     Patient Fasting > 8hrs? 08/08/2022 Yes   Final     ALT 08/08/2022 31  0 - 70 U/L Final     Prostate Specific Antigen Screen 08/08/2022 2.06  " 0.00 - 4.00 ug/L Final     Hold Specimen 08/08/2022 [facility]   Final     Hold Specimen 08/08/2022 [facility]   Final                 .  ..

## 2022-10-15 DIAGNOSIS — F41.9 ANXIETY: ICD-10-CM

## 2022-10-15 DIAGNOSIS — I10 BENIGN ESSENTIAL HYPERTENSION: ICD-10-CM

## 2022-10-17 RX ORDER — METOPROLOL SUCCINATE 25 MG/1
TABLET, EXTENDED RELEASE ORAL
Qty: 90 TABLET | Refills: 2 | Status: SHIPPED | OUTPATIENT
Start: 2022-10-17 | End: 2023-07-25

## 2022-10-21 ENCOUNTER — NURSE TRIAGE (OUTPATIENT)
Dept: FAMILY MEDICINE | Facility: OTHER | Age: 77
End: 2022-10-21

## 2022-10-21 NOTE — TELEPHONE ENCOUNTER
"Call placed to patient to discuss positive covid 19 results.   States managing well at home.  States daughter called clinic to report positive test  States he is not interested in antiviral tx but does not want to hurt daughters feellings.  Writer recommended patient research antiviral tx & have a family discussion regarding personal decision.  Patient in agreement.  No further concerns at calls end.          Underlying Medical Conditions: Age    Patient testing positive on 10/21/22     Test by:  10/21/22    Start of Symptoms:  10/20/22    Symptoms to include: coughing, sore throat, headache    Covid 19 Vaccination Status:      Are you pregnant, breastfeeding or trying to conceive?     Symptom Management:  Cough syrup    Appointment Scheduled:      Pharmacy:     Confirmed with Pharmacy: Paxlovid & Molnupiravir in stock    Relayed instructions below. Patient relayed understanding. No further concerns with Quarantine guidelines.     Instructions for Patients  Your COVID-19 test was positive. This means you have the virus. Please follow the \"How can I take care of myself\" and \"How do I self-isolate?\" guidelines in these instructions.     What treatments are available?  Over-the-counter medicines may help with your symptoms such as runny or stuffy nose, cough, chills, and fever. Talk to your care team about your options.      Some people are at high risk for severe illness (for example if you have a weak immune system, you're 65 or older, or you have certain medical problems). If your risk it high and your symptoms started in the last 5 to 7 days, we strongly recommend for you to get COVID treatment as soon as possible before you get really sick. Paxlovid, Molnupiravir and the monoclonal antibody treatments are proven safe and effective, make you feel better faster, and prevent hospitalization and death.           What are the symptoms of COVID-19?  Symptoms can include fever, cough, shortness of breath, chills, " headache, muscle pain sore throat, fatigue, runny or stuffy nose, and loss of taste and smell. Other less common symptoms include nausea, vomiting, or diarrhea (watery stools).     Know when to call 911. Emergency warning signs include:    Trouble breathing or shortness of breath    Pain or pressure in the chest that doesn't go away    Feeling confused like you haven't felt before, or not being able to wake up    Bluish-colored lips or face     How can I take care of myself?  1. Get lots of rest. Drink extra fluids (unless a doctor has told you not to).  2. Take Tylenol (acetaminophen) for fever or pain. If you have liver or kidney problems, ask your family doctor if it's okay to take Tylenol              Adults:              650 mg (two 325 mg pills or tablets) every 4 to 6 hours, or...              1,000 mg (two 500 mg pills or tablets) every 8 hours as needed.  Note: Don't take more than 3,000 mg in one day. Acetaminophen is found in many medicines (both prescribed and over the counter). Read all labels to be sure you don't take too much.  For children, check the Tylenol bottle for the right dose. The dose is based on the child's age or weight.  3. Take over the counter medicines for your symptoms as needed. Talk to your pharmacist.  4. If you have other health problems (like cancer, heart failure, an organ transplant, or severe kidney disease): Call your specialty clinic if you don't feel better in the next 2 days.     These guidelines are for isolating and quarantining before returning to work, school or .     For employers, schools and day cares: This is an official notice for this person's medical guidelines for returning in-person.     For health care sites: The CDC gives different isolation and quarantine guidelines for healthcare sites, please check with these sites before arriving.      How do I self-isolate?  You isolate when you have symptoms of COVID or a test shows you have COVID, even if you  don't have symptoms.   1. If you DO have symptoms:  ? Stay home and away from others  ? For at least 5 days after your symptoms started, AND   ? You are fever free for 24 hours (with no medicine that reduces fever), AND  ? Your other symptoms are better.  ? Wear a mask for 10 full days any time you are around others.  2. If you DON'T have symptoms:  ? Stay at home and away from others for at least 5 days after your positive test.  ? Wear a mask for 10 full days any time you are around others.     How and when do I quarantine?  You quarantine when you may have been exposed to the virus and DON'T have symptoms.   1. Stay home and away from others.   2. You must quarantine for 5 days after your last contact with a person who has COVID.  ? Note: If you are fully vaccinated, you don't need to quarantine. You should still follow the steps below.   3. Wear a mask for 10 full days any time you're around others.  4. Get tested at least 5 days after you were exposed, even if you don't have symptoms.   5. If you start to have symptoms, isolate right away and get tested.     Where can I get more information?    [facility] COVID-19 Resource Hub: [email or web address]     CDC Quarantine & Isolation: https://www.cdc.gov/coronavirus/2019-ncov/your-health/quarantine-isolation.html     CDC - What to Do If You're Sick: https://www.cdc.gov/coronavirus/2019-ncov/if-you-are-sick/index.html    [facility] clinical trials (COVID-19 research studies): [email or web address].[email or web address]/umn-clinical-trials    [place] Department of Health COVID-19 Public Hotline: 1-422.202.6829

## 2023-01-02 DIAGNOSIS — M79.671 RIGHT FOOT PAIN: ICD-10-CM

## 2023-01-04 NOTE — TELEPHONE ENCOUNTER
Meera      Last Written Prescription Date:  10.8.22  Last Fill Quantity: #90,   # refills: 0  Last Office Visit: 8.11.22  Future Office visit:    Next 5 appointments (look out 90 days)    Feb 07, 2023  8:30 AM  (Arrive by 8:15 AM)  SHORT with Hodan Tapia MD  [facility] ([facility] ) [address]  [facility] [address]  [phone]           Routing refill request to provider for review/approval because:

## 2023-01-05 RX ORDER — MELOXICAM 7.5 MG/1
TABLET ORAL
Qty: 90 TABLET | Refills: 0 | Status: SHIPPED | OUTPATIENT
Start: 2023-01-05 | End: 2023-04-14

## 2023-02-06 ENCOUNTER — LAB (OUTPATIENT)
Dept: LAB | Facility: OTHER | Age: 78
End: 2023-02-06
Payer: COMMERCIAL

## 2023-02-06 DIAGNOSIS — E78.00 HYPERCHOLESTEROLEMIA: ICD-10-CM

## 2023-02-06 DIAGNOSIS — I10 BENIGN ESSENTIAL HYPERTENSION: ICD-10-CM

## 2023-02-06 LAB
ALT SERPL W P-5'-P-CCNC: 30 U/L (ref 10–50)
ANION GAP SERPL CALCULATED.3IONS-SCNC: 12 MMOL/L (ref 7–15)
BUN SERPL-MCNC: 17.6 MG/DL (ref 8–23)
CALCIUM SERPL-MCNC: 9.5 MG/DL (ref 8.8–10.2)
CHLORIDE SERPL-SCNC: 102 MMOL/L (ref 98–107)
CHOLEST SERPL-MCNC: 175 MG/DL
CREAT SERPL-MCNC: 1.27 MG/DL (ref 0.67–1.17)
DEPRECATED HCO3 PLAS-SCNC: 25 MMOL/L (ref 22–29)
GFR SERPL CREATININE-BSD FRML MDRD: 58 ML/MIN/1.73M2
GLUCOSE SERPL-MCNC: 97 MG/DL (ref 70–99)
HDLC SERPL-MCNC: 43 MG/DL
HOLD SPECIMEN: NORMAL
LDLC SERPL CALC-MCNC: 110 MG/DL
NONHDLC SERPL-MCNC: 132 MG/DL
POTASSIUM SERPL-SCNC: 4.6 MMOL/L (ref 3.4–5.3)
SODIUM SERPL-SCNC: 139 MMOL/L (ref 136–145)
TRIGL SERPL-MCNC: 110 MG/DL

## 2023-02-06 PROCEDURE — 80061 LIPID PANEL: CPT | Mod: ZL

## 2023-02-06 PROCEDURE — 80048 BASIC METABOLIC PNL TOTAL CA: CPT | Mod: ZL

## 2023-02-06 PROCEDURE — 36415 COLL VENOUS BLD VENIPUNCTURE: CPT | Mod: ZL

## 2023-02-06 PROCEDURE — 84460 ALANINE AMINO (ALT) (SGPT): CPT | Mod: ZL

## 2023-02-07 ENCOUNTER — OFFICE VISIT (OUTPATIENT)
Dept: FAMILY MEDICINE | Facility: OTHER | Age: 78
End: 2023-02-07
Attending: FAMILY MEDICINE
Payer: COMMERCIAL

## 2023-02-07 VITALS
BODY MASS INDEX: 25.15 KG/M2 | SYSTOLIC BLOOD PRESSURE: 124 MMHG | RESPIRATION RATE: 16 BRPM | OXYGEN SATURATION: 97 % | WEIGHT: 160.2 LBS | HEART RATE: 63 BPM | HEIGHT: 67 IN | TEMPERATURE: 97.8 F | DIASTOLIC BLOOD PRESSURE: 68 MMHG

## 2023-02-07 DIAGNOSIS — N18.31 CHRONIC KIDNEY DISEASE, STAGE 3A (H): ICD-10-CM

## 2023-02-07 DIAGNOSIS — Z12.5 SCREENING FOR MALIGNANT NEOPLASM OF PROSTATE: ICD-10-CM

## 2023-02-07 DIAGNOSIS — E78.00 HYPERCHOLESTEROLEMIA: Primary | ICD-10-CM

## 2023-02-07 DIAGNOSIS — I10 BENIGN ESSENTIAL HYPERTENSION: ICD-10-CM

## 2023-02-07 DIAGNOSIS — M47.817 SPONDYLOSIS OF LUMBOSACRAL REGION WITHOUT MYELOPATHY OR RADICULOPATHY: ICD-10-CM

## 2023-02-07 PROCEDURE — 99214 OFFICE O/P EST MOD 30 MIN: CPT | Performed by: FAMILY MEDICINE

## 2023-02-07 PROCEDURE — G0463 HOSPITAL OUTPT CLINIC VISIT: HCPCS

## 2023-02-07 ASSESSMENT — ANXIETY QUESTIONNAIRES
IF YOU CHECKED OFF ANY PROBLEMS ON THIS QUESTIONNAIRE, HOW DIFFICULT HAVE THESE PROBLEMS MADE IT FOR YOU TO DO YOUR WORK, TAKE CARE OF THINGS AT HOME, OR GET ALONG WITH OTHER PEOPLE: NOT DIFFICULT AT ALL
GAD7 TOTAL SCORE: 0
1. FEELING NERVOUS, ANXIOUS, OR ON EDGE: NOT AT ALL
5. BEING SO RESTLESS THAT IT IS HARD TO SIT STILL: NOT AT ALL
GAD7 TOTAL SCORE: 0
3. WORRYING TOO MUCH ABOUT DIFFERENT THINGS: NOT AT ALL
6. BECOMING EASILY ANNOYED OR IRRITABLE: NOT AT ALL
7. FEELING AFRAID AS IF SOMETHING AWFUL MIGHT HAPPEN: NOT AT ALL
2. NOT BEING ABLE TO STOP OR CONTROL WORRYING: NOT AT ALL
4. TROUBLE RELAXING: NOT AT ALL

## 2023-02-07 ASSESSMENT — PATIENT HEALTH QUESTIONNAIRE - PHQ9: SUM OF ALL RESPONSES TO PHQ QUESTIONS 1-9: 0

## 2023-02-07 ASSESSMENT — PAIN SCALES - GENERAL: PAINLEVEL: NO PAIN (0)

## 2023-02-07 NOTE — PROGRESS NOTES
"  Assessment & Plan     1. Hypercholesterolemia  No changes to current medication, follow-up in six months, sooner as needed.  Will place future lab orders.    2. Benign essential hypertension  As above.    3. Spondylosis of lumbosacral region without myelopathy or radiculopathy  No changes    4. Chronic kidney disease, stage 3a (H)  Encouraged increased water intake, patient will work on that.        BMI:   Estimated body mass index is 25.09 kg/m  as calculated from the following:    Height as of this encounter: 1.702 m (5' 7\").    Weight as of this encounter: 72.7 kg (160 lb 3.2 oz).     Return in about 6 months (around 8/7/2023) for Chronic Disease Management, Medication review.    Hodan Tapia MD  [facility] - ROSALINDA Caal is a 77 year old presenting for the following health issues:  Hypertension, Lipids, and Musculoskeletal Problem      HPI     Hyperlipidemia Follow-Up      Are you regularly taking any medication or supplement to lower your cholesterol?   Yes- Zocor    Are you having muscle aches or other side effects that you think could be caused by your cholesterol lowering medication?  No    Hypertension Follow-up      Do you check your blood pressure regularly outside of the clinic? Yes     Are you following a low salt diet? Yes    Are your blood pressures ever more than 140 on the top number (systolic) OR more   than 90 on the bottom number (diastolic), for example 140/90? No    Pain History:  When did you first notice your pain? - Chronic Pain   Have you seen this provider for your pain in the past?   Yes   Where in your body do you have pain? Back  Are you seeing anyone else for your pain? No    PHQ-9 SCORE 2/10/2022 8/11/2022 2/7/2023   PHQ-9 Total Score 2 0 0       NAVYA-7 SCORE 2/10/2022 8/11/2022 2/7/2023   Total Score 0 0 0         Chronic Pain Follow Up:    Location of pain: low back  Analgesia/pain control:    - Recent changes:  none    - Overall control: " "Comfortably manageable    - Current treatments: Tylenol #3 and back injections   Adherence:     - Do you ever take more pain medicine than prescribed? No    - When did you take your last dose of pain medicine?  Almost 2 years ago   Adverse effects: No   PDMP Review       Value Time User    State PDMP site checked  Yes 2/7/2023  8:28 AM Hodan Tapia MD        Last CSA Agreement:   CSA -- Patient Level:    CSA: None found at the patient level.       Last UDS:         Review of Systems   Constitutional, HEENT, cardiovascular, pulmonary, gi and gu systems are negative, except as otherwise noted.      Objective    /68 (BP Location: Right arm, Patient Position: Sitting, Cuff Size: Adult Regular)   Pulse 63   Temp 97.8  F (36.6  C) (Tympanic)   Resp 16   Ht 1.702 m (5' 7\")   Wt 72.7 kg (160 lb 3.2 oz)   SpO2 97%   BMI 25.09 kg/m    Body mass index is 25.09 kg/m .  Physical Exam   GENERAL: healthy, alert and no distress  PSYCH: mentation appears normal, affect normal/bright    Lab on 02/06/2023   Component Date Value Ref Range Status     ALT 02/06/2023 30  10 - 50 U/L Final     Cholesterol 02/06/2023 175  <200 mg/dL Final     Triglycerides 02/06/2023 110  <150 mg/dL Final     Direct Measure HDL 02/06/2023 43  >=40 mg/dL Final     LDL Cholesterol Calculated 02/06/2023 110 (H)  <=100 mg/dL Final     Non HDL Cholesterol 02/06/2023 132 (H)  <130 mg/dL Final     Sodium 02/06/2023 139  136 - 145 mmol/L Final     Potassium 02/06/2023 4.6  3.4 - 5.3 mmol/L Final     Chloride 02/06/2023 102  98 - 107 mmol/L Final     Carbon Dioxide (CO2) 02/06/2023 25  22 - 29 mmol/L Final     Anion Gap 02/06/2023 12  7 - 15 mmol/L Final     Urea Nitrogen 02/06/2023 17.6  8.0 - 23.0 mg/dL Final     Creatinine 02/06/2023 1.27 (H)  0.67 - 1.17 mg/dL Final     Calcium 02/06/2023 9.5  8.8 - 10.2 mg/dL Final     Glucose 02/06/2023 97  70 - 99 mg/dL Final     GFR Estimate 02/06/2023 58 (L)  >60 mL/min/1.73m2 Final    eGFR calculated " using 2021 CKD-EPI equation.     Hold Specimen 02/06/2023 Critical access hospital   Final

## 2023-04-14 DIAGNOSIS — M79.671 RIGHT FOOT PAIN: ICD-10-CM

## 2023-04-14 RX ORDER — MELOXICAM 7.5 MG/1
TABLET ORAL
Qty: 90 TABLET | Refills: 1 | Status: SHIPPED | OUTPATIENT
Start: 2023-04-14 | End: 2024-02-19

## 2023-04-14 NOTE — TELEPHONE ENCOUNTER
Meloxicam      Last Written Prescription Date:  1/5/23  Last Fill Quantity: 90,   # refills: 0  Last Office Visit: 2/7/23  Future Office visit:

## 2023-04-28 DIAGNOSIS — E78.00 HYPERCHOLESTEROLEMIA: ICD-10-CM

## 2023-04-28 RX ORDER — SIMVASTATIN 40 MG
TABLET ORAL
Qty: 90 TABLET | Refills: 2 | Status: SHIPPED | OUTPATIENT
Start: 2023-04-28 | End: 2024-02-19

## 2023-04-28 NOTE — TELEPHONE ENCOUNTER
Simvastatin (Zocor) 40 MG tablet    Last Written Prescription Date:  02/10/2022  Last Fill Quantity: 90,   # refills: 3  Last Office Visit: 02/07/2023

## 2023-05-01 NOTE — PROGRESS NOTES
"  Assessment & Plan     1. Chronic pain of both shoulders  Patient tolerated injections well.  Monitor for signs of infection, follow-up as needed.  - Large Joint/Bursa injection and/or drainage (Shoulder, Knee)  - triamcinolone (KENALOG-40) injection 80 mg  - lidocaine 1 % injection 8 mL        BMI:   Estimated body mass index is 25.51 kg/m  as calculated from the following:    Height as of this encounter: 1.702 m (5' 7\").    Weight as of this encounter: 73.9 kg (162 lb 14.4 oz).     Return if symptoms worsen or fail to improve.    Hodan Tapia MD  [facility] - ROSALINDA Caal is a 78 year old, presenting for the following health issues:  Musculoskeletal Problem      HPI     Pain History:  When did you first notice your pain? Several years   Have you seen this provider for your pain in the past?   Yes   Where in your body do you have pain? shoulders  Are you seeing anyone else for your pain? No        2/10/2022     9:00 AM 8/11/2022     8:00 AM 2/7/2023     8:00 AM   PHQ-9 SCORE   PHQ-9 Total Score 2 0 0           8/11/2022     8:00 AM 2/7/2023     8:13 AM 5/2/2023    10:55 AM   NAVYA-7 SCORE   Total Score 0 0 0         Chronic Pain Follow Up:    Location of pain: Shoulders  Analgesia/pain control:    - Recent changes:  none    - Overall control: Tolerable with discomfort    - Current treatments: Tylenol #3   Adherence:     - Do you ever take more pain medicine than prescribed? No    - When did you take your last dose of pain medicine?  This am   Adverse effects: No   PDMP Review       Value Time User    State PDMP site checked  Yes 2/7/2023  8:28 AM Hodan Tapia MD        Last CSA Agreement:   CSA -- Patient Level:    CSA: None found at the patient level.       Last UDS:         Review of Systems   Constitutional, HEENT, cardiovascular, pulmonary, gi and gu systems are negative, except as otherwise noted.      Objective    /70 (BP Location: Right arm, Patient " "Position: Sitting, Cuff Size: Adult Regular)   Pulse 60   Temp 98  F (36.7  C) (Tympanic)   Resp 16   Ht 1.702 m (5' 7\")   Wt 73.9 kg (162 lb 14.4 oz)   SpO2 97%   BMI 25.51 kg/m    Body mass index is 25.51 kg/m .  Physical Exam   GENERAL: healthy, alert and no distress  PSYCH: mentation appears normal, affect normal/bright    Procedure: steroid injection of left shoulder using posterior approach  Indication: pain, arthritis  Consent: verbal and written consent was obtained from patient after risks and benefits are discussed  Description:  Time out procedure is completed.  Joint space is identified and marked.  Betadine prep is completed.  Ethyl chloride spray is used for topical anesthesia.  1 cc Kenalog 40mg/cc and 4 cc 1% Lidocaine without epinephrine are injected without difficulty.  Patient tolerated procedure well.    Procedure: steroid injection of right shoulder using posterior approach  Indication: pain, arthritis  Consent: verbal and written consent was obtained from patient after risks and benefits are discussed  Description:  Time out procedure is completed.  Joint space is identified and marked.  Betadine prep is completed.  Ethyl chloride spray is used for topical anesthesia.  1 cc Kenalog 40mg/cc and 4 cc 1% Lidocaine without epinephrine are injected without difficulty.  Patient tolerated procedure well.                "

## 2023-05-02 ENCOUNTER — OFFICE VISIT (OUTPATIENT)
Dept: FAMILY MEDICINE | Facility: OTHER | Age: 78
End: 2023-05-02
Attending: FAMILY MEDICINE
Payer: COMMERCIAL

## 2023-05-02 VITALS
HEART RATE: 60 BPM | SYSTOLIC BLOOD PRESSURE: 128 MMHG | BODY MASS INDEX: 25.57 KG/M2 | HEIGHT: 67 IN | DIASTOLIC BLOOD PRESSURE: 70 MMHG | OXYGEN SATURATION: 97 % | WEIGHT: 162.9 LBS | RESPIRATION RATE: 16 BRPM | TEMPERATURE: 98 F

## 2023-05-02 DIAGNOSIS — M25.512 CHRONIC PAIN OF BOTH SHOULDERS: Primary | ICD-10-CM

## 2023-05-02 DIAGNOSIS — M25.511 CHRONIC PAIN OF BOTH SHOULDERS: Primary | ICD-10-CM

## 2023-05-02 DIAGNOSIS — G89.29 CHRONIC PAIN OF BOTH SHOULDERS: Primary | ICD-10-CM

## 2023-05-02 PROCEDURE — 20610 DRAIN/INJ JOINT/BURSA W/O US: CPT | Mod: 50 | Performed by: FAMILY MEDICINE

## 2023-05-02 PROCEDURE — 250N000011 HC RX IP 250 OP 636: Performed by: FAMILY MEDICINE

## 2023-05-02 PROCEDURE — G0463 HOSPITAL OUTPT CLINIC VISIT: HCPCS | Mod: 25

## 2023-05-02 PROCEDURE — 250N000009 HC RX 250: Performed by: FAMILY MEDICINE

## 2023-05-02 RX ORDER — LIDOCAINE HYDROCHLORIDE 10 MG/ML
8 INJECTION, SOLUTION INFILTRATION; PERINEURAL ONCE
Status: COMPLETED | OUTPATIENT
Start: 2023-05-02 | End: 2023-05-02

## 2023-05-02 RX ORDER — TRIAMCINOLONE ACETONIDE 40 MG/ML
80 INJECTION, SUSPENSION INTRA-ARTICULAR; INTRAMUSCULAR ONCE
Status: COMPLETED | OUTPATIENT
Start: 2023-05-02 | End: 2023-05-02

## 2023-05-02 RX ADMIN — TRIAMCINOLONE ACETONIDE 80 MG: 40 INJECTION, SUSPENSION INTRA-ARTICULAR; INTRAMUSCULAR at 14:11

## 2023-05-02 RX ADMIN — LIDOCAINE HYDROCHLORIDE 5 ML: 10 INJECTION, SOLUTION EPIDURAL; INFILTRATION; INTRACAUDAL; PERINEURAL at 14:11

## 2023-05-02 ASSESSMENT — ANXIETY QUESTIONNAIRES
1. FEELING NERVOUS, ANXIOUS, OR ON EDGE: NOT AT ALL
6. BECOMING EASILY ANNOYED OR IRRITABLE: NOT AT ALL
4. TROUBLE RELAXING: NOT AT ALL
2. NOT BEING ABLE TO STOP OR CONTROL WORRYING: NOT AT ALL
3. WORRYING TOO MUCH ABOUT DIFFERENT THINGS: NOT AT ALL
GAD7 TOTAL SCORE: 0
IF YOU CHECKED OFF ANY PROBLEMS ON THIS QUESTIONNAIRE, HOW DIFFICULT HAVE THESE PROBLEMS MADE IT FOR YOU TO DO YOUR WORK, TAKE CARE OF THINGS AT HOME, OR GET ALONG WITH OTHER PEOPLE: NOT DIFFICULT AT ALL
5. BEING SO RESTLESS THAT IT IS HARD TO SIT STILL: NOT AT ALL
7. FEELING AFRAID AS IF SOMETHING AWFUL MIGHT HAPPEN: NOT AT ALL
GAD7 TOTAL SCORE: 0

## 2023-05-02 ASSESSMENT — PAIN SCALES - GENERAL: PAINLEVEL: MODERATE PAIN (4)

## 2023-07-23 DIAGNOSIS — F41.9 ANXIETY: ICD-10-CM

## 2023-07-23 DIAGNOSIS — I10 BENIGN ESSENTIAL HYPERTENSION: ICD-10-CM

## 2023-07-25 RX ORDER — METOPROLOL SUCCINATE 25 MG/1
TABLET, EXTENDED RELEASE ORAL
Qty: 90 TABLET | Refills: 2 | Status: SHIPPED | OUTPATIENT
Start: 2023-07-25 | End: 2024-04-26

## 2023-07-25 NOTE — TELEPHONE ENCOUNTER
Metoprolol succinate ER 25 mg      Last Written Prescription Date:  10-17-22  Last Fill Quantity: 90,   # refills: 2  Last Office Visit: 5-2-23  Future Office visit:    Next 5 appointments (look out 90 days)      Aug 02, 2023 10:00 AM  (Arrive by 9:45 AM)  Nurse Only with Lesly Cabello  [facility] ([facility] ) [address]  [place] [address]  [phone]     Aug 11, 2023  8:30 AM  (Arrive by 8:15 AM)  SHORT with Hodan Tapia MD  [facility] ([facility] ) [address]  [place] [address]  [phone]

## 2023-08-01 DIAGNOSIS — H91.93 DECREASED HEARING OF BOTH EARS: Primary | ICD-10-CM

## 2023-08-02 ENCOUNTER — OFFICE VISIT (OUTPATIENT)
Dept: AUDIOLOGY | Facility: OTHER | Age: 78
End: 2023-08-02
Attending: AUDIOLOGIST
Payer: COMMERCIAL

## 2023-08-02 DIAGNOSIS — H90.3 SENSORINEURAL HEARING LOSS (SNHL) OF BOTH EARS: Primary | ICD-10-CM

## 2023-08-02 DIAGNOSIS — H91.93 DECREASED HEARING OF BOTH EARS: Primary | ICD-10-CM

## 2023-08-02 DIAGNOSIS — H91.93 DECREASED HEARING OF BOTH EARS: ICD-10-CM

## 2023-08-02 PROCEDURE — 92552 PURE TONE AUDIOMETRY AIR: CPT | Performed by: AUDIOLOGIST

## 2023-08-02 PROCEDURE — V5299 HEARING SERVICE: HCPCS

## 2023-08-02 PROCEDURE — 92556 SPEECH AUDIOMETRY COMPLETE: CPT | Performed by: AUDIOLOGIST

## 2023-08-02 NOTE — PROGRESS NOTES
HEARING AID CHECK    BACKGROUND:  Ten Flores, a 78 year old male, was seen today for an hearing aid check.  Mr. Flores wears Binaural Oticon OPN 3 miniRITE hearing aids.  Mr. Flores reported no concerns.    FINDINGS:  Visual inspection and cleaning provided.   C-stop changed with new. 10mmDV domes changed with new. Battery was tested and good. Good listening check.    Reviewed cleaning, troubleshooting, and preventative care with patient. Any cleaning tools used were provided as customer courtesy.    Services performed and warranty reviewed with patient.    PLAN:  Patient to be seen next by audiologist. Mr. Flores will return to clinic in 12 months, sooner if needed. Patient had no further questions and reports satisfaction.         Kavitha Cabello  Audiology Assistant  [facility]  [phone]

## 2023-08-02 NOTE — PROGRESS NOTES
Audiology Evaluation Completed. Please refer SCANNED AUDIOGRAM and/or TYMPANOGRAM for BACKGROUND, RESULTS, RECOMMENDATIONS.      Sadie FAIRBANKS, [facility]  Audiologist #[id number]

## 2023-08-09 ENCOUNTER — LAB (OUTPATIENT)
Dept: LAB | Facility: OTHER | Age: 78
End: 2023-08-09
Payer: COMMERCIAL

## 2023-08-09 DIAGNOSIS — E78.00 HYPERCHOLESTEROLEMIA: ICD-10-CM

## 2023-08-09 DIAGNOSIS — Z12.5 SCREENING FOR MALIGNANT NEOPLASM OF PROSTATE: ICD-10-CM

## 2023-08-09 DIAGNOSIS — I10 BENIGN ESSENTIAL HYPERTENSION: ICD-10-CM

## 2023-08-09 LAB
ALT SERPL W P-5'-P-CCNC: 32 U/L (ref 0–70)
ANION GAP SERPL CALCULATED.3IONS-SCNC: 12 MMOL/L (ref 7–15)
BUN SERPL-MCNC: 18.6 MG/DL (ref 8–23)
CALCIUM SERPL-MCNC: 10.1 MG/DL (ref 8.8–10.2)
CHLORIDE SERPL-SCNC: 101 MMOL/L (ref 98–107)
CHOLEST SERPL-MCNC: 178 MG/DL
CREAT SERPL-MCNC: 1.28 MG/DL (ref 0.67–1.17)
DEPRECATED HCO3 PLAS-SCNC: 25 MMOL/L (ref 22–29)
GFR SERPL CREATININE-BSD FRML MDRD: 57 ML/MIN/1.73M2
GLUCOSE SERPL-MCNC: 95 MG/DL (ref 70–99)
HDLC SERPL-MCNC: 50 MG/DL
LDLC SERPL CALC-MCNC: 105 MG/DL
NONHDLC SERPL-MCNC: 128 MG/DL
POTASSIUM SERPL-SCNC: 4.4 MMOL/L (ref 3.4–5.3)
PSA SERPL DL<=0.01 NG/ML-MCNC: 1.98 NG/ML (ref 0–6.5)
SODIUM SERPL-SCNC: 138 MMOL/L (ref 136–145)
TRIGL SERPL-MCNC: 113 MG/DL

## 2023-08-09 PROCEDURE — 84460 ALANINE AMINO (ALT) (SGPT): CPT | Mod: ZL

## 2023-08-09 PROCEDURE — 36415 COLL VENOUS BLD VENIPUNCTURE: CPT | Mod: ZL

## 2023-08-09 PROCEDURE — 82947 ASSAY GLUCOSE BLOOD QUANT: CPT | Mod: ZL

## 2023-08-09 PROCEDURE — G0103 PSA SCREENING: HCPCS | Mod: ZL

## 2023-08-09 PROCEDURE — 80061 LIPID PANEL: CPT | Mod: ZL

## 2023-08-09 PROCEDURE — 82374 ASSAY BLOOD CARBON DIOXIDE: CPT | Mod: ZL

## 2023-08-10 NOTE — PROGRESS NOTES
"  Assessment & Plan     1. Hypercholesterolemia  No changes to current medication, follow-up in six months, sooner as needed.    2. Benign essential hypertension  As above, will refill medication when due.         BMI:   Estimated body mass index is 24.75 kg/m  as calculated from the following:    Height as of 5/2/23: 1.702 m (5' 7\").    Weight as of this encounter: 71.7 kg (158 lb).     Return in about 6 months (around 2/11/2024) for Chronic Disease Management, Medication review.    Hodan Tapia MD  [facility] - [place] Caal is a 78 year old, presenting for the following health issues:  Follow Up      HPI     Hyperlipidemia Follow-Up    Are you regularly taking any medication or supplement to lower your cholesterol?   Yes- ZOCOR  Are you having muscle aches or other side effects that you think could be caused by your cholesterol lowering medication?  Yes- muscle cramps    Hypertension Follow-up    Do you check your blood pressure regularly outside of the clinic? Yes   Are you following a low salt diet? Yes  Are your blood pressures ever more than 140 on the top number (systolic) OR more   than 90 on the bottom number (diastolic), for example 140/90? No - but 144/89        Review of Systems   Constitutional, HEENT, cardiovascular, pulmonary, gi and gu systems are negative, except as otherwise noted.      Objective    /70 (BP Location: Right arm, Patient Position: Sitting, Cuff Size: Adult Large)   Pulse 63   Temp 97.5  F (36.4  C) (Tympanic)   Wt 71.7 kg (158 lb)   SpO2 96%   BMI 24.75 kg/m    Body mass index is 24.75 kg/m .  Physical Exam   GENERAL: healthy, alert and no distress  PSYCH: mentation appears normal, affect normal/bright      Lab on 08/09/2023   Component Date Value Ref Range Status    Sodium 08/09/2023 138  136 - 145 mmol/L Final    Potassium 08/09/2023 4.4  3.4 - 5.3 mmol/L Final    Chloride 08/09/2023 101  98 - 107 mmol/L Final    Carbon Dioxide (CO2) " 08/09/2023 25  22 - 29 mmol/L Final    Anion Gap 08/09/2023 12  7 - 15 mmol/L Final    Urea Nitrogen 08/09/2023 18.6  8.0 - 23.0 mg/dL Final    Creatinine 08/09/2023 1.28 (H)  0.67 - 1.17 mg/dL Final    Calcium 08/09/2023 10.1  8.8 - 10.2 mg/dL Final    Glucose 08/09/2023 95  70 - 99 mg/dL Final    GFR Estimate 08/09/2023 57 (L)  >60 mL/min/1.73m2 Final    ALT 08/09/2023 32  0 - 70 U/L Final    Reference intervals for this test were updated on 6/12/2023 to more accurately reflect our healthy population. There may be differences in the flagging of prior results with similar values performed with this method. Interpretation of those prior results can be made in the context of the updated reference intervals.      Cholesterol 08/09/2023 178  <200 mg/dL Final    Triglycerides 08/09/2023 113  <150 mg/dL Final    Direct Measure HDL 08/09/2023 50  >=40 mg/dL Final    LDL Cholesterol Calculated 08/09/2023 105 (H)  <=100 mg/dL Final    Non HDL Cholesterol 08/09/2023 128  <130 mg/dL Final    Prostate Specific Antigen Screen 08/09/2023 1.98  0.00 - 6.50 ng/mL Final

## 2023-08-11 ENCOUNTER — OFFICE VISIT (OUTPATIENT)
Dept: FAMILY MEDICINE | Facility: OTHER | Age: 78
End: 2023-08-11
Attending: FAMILY MEDICINE
Payer: COMMERCIAL

## 2023-08-11 VITALS
DIASTOLIC BLOOD PRESSURE: 70 MMHG | BODY MASS INDEX: 24.75 KG/M2 | HEART RATE: 63 BPM | SYSTOLIC BLOOD PRESSURE: 130 MMHG | TEMPERATURE: 97.5 F | WEIGHT: 158 LBS | OXYGEN SATURATION: 96 %

## 2023-08-11 DIAGNOSIS — I10 BENIGN ESSENTIAL HYPERTENSION: ICD-10-CM

## 2023-08-11 DIAGNOSIS — E78.00 HYPERCHOLESTEROLEMIA: Primary | ICD-10-CM

## 2023-08-11 PROCEDURE — G0463 HOSPITAL OUTPT CLINIC VISIT: HCPCS

## 2023-08-11 PROCEDURE — 99214 OFFICE O/P EST MOD 30 MIN: CPT | Performed by: FAMILY MEDICINE

## 2023-08-11 ASSESSMENT — ANXIETY QUESTIONNAIRES
GAD7 TOTAL SCORE: 0
5. BEING SO RESTLESS THAT IT IS HARD TO SIT STILL: NOT AT ALL
3. WORRYING TOO MUCH ABOUT DIFFERENT THINGS: NOT AT ALL
6. BECOMING EASILY ANNOYED OR IRRITABLE: NOT AT ALL
7. FEELING AFRAID AS IF SOMETHING AWFUL MIGHT HAPPEN: NOT AT ALL
1. FEELING NERVOUS, ANXIOUS, OR ON EDGE: NOT AT ALL
GAD7 TOTAL SCORE: 0
4. TROUBLE RELAXING: NOT AT ALL
IF YOU CHECKED OFF ANY PROBLEMS ON THIS QUESTIONNAIRE, HOW DIFFICULT HAVE THESE PROBLEMS MADE IT FOR YOU TO DO YOUR WORK, TAKE CARE OF THINGS AT HOME, OR GET ALONG WITH OTHER PEOPLE: NOT DIFFICULT AT ALL
2. NOT BEING ABLE TO STOP OR CONTROL WORRYING: NOT AT ALL

## 2023-08-11 ASSESSMENT — PAIN SCALES - GENERAL: PAINLEVEL: NO PAIN (0)

## 2023-12-06 ENCOUNTER — OFFICE VISIT (OUTPATIENT)
Dept: FAMILY MEDICINE | Facility: OTHER | Age: 78
End: 2023-12-06
Attending: FAMILY MEDICINE
Payer: COMMERCIAL

## 2023-12-06 VITALS
SYSTOLIC BLOOD PRESSURE: 134 MMHG | BODY MASS INDEX: 25.37 KG/M2 | OXYGEN SATURATION: 97 % | DIASTOLIC BLOOD PRESSURE: 78 MMHG | TEMPERATURE: 97.8 F | WEIGHT: 162 LBS | HEART RATE: 66 BPM | RESPIRATION RATE: 18 BRPM

## 2023-12-06 DIAGNOSIS — M25.512 CHRONIC PAIN OF BOTH SHOULDERS: Primary | ICD-10-CM

## 2023-12-06 DIAGNOSIS — M25.511 CHRONIC PAIN OF BOTH SHOULDERS: Primary | ICD-10-CM

## 2023-12-06 DIAGNOSIS — G89.29 CHRONIC PAIN OF BOTH SHOULDERS: Primary | ICD-10-CM

## 2023-12-06 PROCEDURE — 250N000011 HC RX IP 250 OP 636: Mod: JZ | Performed by: FAMILY MEDICINE

## 2023-12-06 PROCEDURE — 20610 DRAIN/INJ JOINT/BURSA W/O US: CPT | Mod: 50 | Performed by: FAMILY MEDICINE

## 2023-12-06 PROCEDURE — G0463 HOSPITAL OUTPT CLINIC VISIT: HCPCS | Mod: 25

## 2023-12-06 RX ORDER — TRIAMCINOLONE ACETONIDE 40 MG/ML
80 INJECTION, SUSPENSION INTRA-ARTICULAR; INTRAMUSCULAR ONCE
Status: COMPLETED | OUTPATIENT
Start: 2023-12-06 | End: 2023-12-06

## 2023-12-06 RX ORDER — LIDOCAINE HYDROCHLORIDE 10 MG/ML
8 INJECTION, SOLUTION INFILTRATION; PERINEURAL ONCE
Status: COMPLETED | OUTPATIENT
Start: 2023-12-06 | End: 2023-12-06

## 2023-12-06 RX ADMIN — LIDOCAINE HYDROCHLORIDE 8 ML: 10 INJECTION, SOLUTION INFILTRATION; PERINEURAL at 11:16

## 2023-12-06 RX ADMIN — TRIAMCINOLONE ACETONIDE 80 MG: 40 INJECTION, SUSPENSION INTRA-ARTICULAR; INTRAMUSCULAR at 11:17

## 2023-12-06 ASSESSMENT — PAIN SCALES - GENERAL: PAINLEVEL: MODERATE PAIN (5)

## 2023-12-06 NOTE — PROGRESS NOTES
"  Assessment & Plan     1. Chronic pain of both shoulders  Patient tolerated injections well, watch for signs of infection.  Follow-up as needed.  - Large Joint/Bursa injection and/or drainage (Shoulder, Knee)  - triamcinolone (KENALOG-40) injection 80 mg  - lidocaine 1 % injection 8 mL          BMI:   Estimated body mass index is 25.37 kg/m  as calculated from the following:    Height as of 5/2/23: 1.702 m (5' 7\").    Weight as of this encounter: 73.5 kg (162 lb).     Return if symptoms worsen or fail to improve.    Hodan Tapia MD  [facility] - [place] Caal is a 78 year old, presenting for the following health issues:  Shoulder      HPI     Bilateral Shoulder Injections     Patient's last injections were last May.  He notes symptoms have been slowly progressing over the past month or so.  He states the last injections gave him quite a bit of relief.      Review of Systems   Constitutional, HEENT, cardiovascular, pulmonary, gi and gu systems are negative, except as otherwise noted.      Objective    /78 (BP Location: Right arm, Patient Position: Sitting, Cuff Size: Adult Regular)   Pulse 66   Temp 97.8  F (36.6  C) (Tympanic)   Resp 18   Wt 73.5 kg (162 lb)   SpO2 97%   BMI 25.37 kg/m    Body mass index is 25.37 kg/m .  Physical Exam   GENERAL: healthy, alert and no distress  PSYCH: mentation appears normal, affect normal/bright    Procedure: steroid injection of left shoulder using posterior approach  Indication: pain, arthritis  Consent: verbal and written consent was obtained from patient after risks and benefits are discussed  Description:  Time out procedure is completed.  Joint space is identified and marked.  Betadine prep is completed.  Ethyl chloride spray is used for topical anesthesia.  1 cc Kenalog 40mg/cc and 4 cc 1% Lidocaine without epinephrine are injected without difficulty.  Patient tolerated procedure well.    Procedure: steroid injection of right " shoulder using posterior approach  Indication: pain, arthritis  Consent: verbal and written consent was obtained from patient after risks and benefits are discussed  Description:  Time out procedure is completed.  Joint space is identified and marked.  Betadine prep is completed.  Ethyl chloride spray is used for topical anesthesia.  1 cc Kenalog 40mg/cc and 4 cc 1% Lidocaine without epinephrine are injected without difficulty.  Patient tolerated procedure well.

## 2024-02-12 ENCOUNTER — LAB (OUTPATIENT)
Dept: LAB | Facility: OTHER | Age: 79
End: 2024-02-12
Payer: COMMERCIAL

## 2024-02-12 DIAGNOSIS — I10 BENIGN ESSENTIAL HYPERTENSION: ICD-10-CM

## 2024-02-12 DIAGNOSIS — E78.00 HYPERCHOLESTEROLEMIA: ICD-10-CM

## 2024-02-12 LAB
ALT SERPL W P-5'-P-CCNC: 32 U/L (ref 0–70)
ANION GAP SERPL CALCULATED.3IONS-SCNC: 11 MMOL/L (ref 7–15)
BUN SERPL-MCNC: 24.4 MG/DL (ref 8–23)
CALCIUM SERPL-MCNC: 9.4 MG/DL (ref 8.8–10.2)
CHLORIDE SERPL-SCNC: 101 MMOL/L (ref 98–107)
CHOLEST SERPL-MCNC: 195 MG/DL
CREAT SERPL-MCNC: 1.34 MG/DL (ref 0.67–1.17)
DEPRECATED HCO3 PLAS-SCNC: 26 MMOL/L (ref 22–29)
EGFRCR SERPLBLD CKD-EPI 2021: 54 ML/MIN/1.73M2
FASTING STATUS PATIENT QL REPORTED: YES
GLUCOSE SERPL-MCNC: 98 MG/DL (ref 70–99)
HDLC SERPL-MCNC: 45 MG/DL
HOLD SPECIMEN: NORMAL
LDLC SERPL CALC-MCNC: 126 MG/DL
NONHDLC SERPL-MCNC: 150 MG/DL
POTASSIUM SERPL-SCNC: 4.6 MMOL/L (ref 3.4–5.3)
SODIUM SERPL-SCNC: 138 MMOL/L (ref 135–145)
TRIGL SERPL-MCNC: 120 MG/DL

## 2024-02-12 PROCEDURE — 84460 ALANINE AMINO (ALT) (SGPT): CPT | Mod: ZL

## 2024-02-12 PROCEDURE — 80048 BASIC METABOLIC PNL TOTAL CA: CPT | Mod: ZL

## 2024-02-12 PROCEDURE — 36415 COLL VENOUS BLD VENIPUNCTURE: CPT | Mod: ZL

## 2024-02-12 PROCEDURE — 80061 LIPID PANEL: CPT | Mod: ZL

## 2024-02-12 NOTE — PROGRESS NOTES
"  Assessment & Plan     1. Hypercholesterolemia  No changes to current medication, will refill when due.  Follow-up in six months, sooner as needed.  Will place future lab orders.    2. Benign essential hypertension  Normally, BP readings are good.  Very mild elevation noted today, I would not adjust medication unless readings remain high.  Will refill current medication when due.    3. Chronic kidney disease, stage 3a (H)  No changes.       BMI  Estimated body mass index is 25.84 kg/m  as calculated from the following:    Height as of 5/2/23: 1.702 m (5' 7\").    Weight as of this encounter: 74.8 kg (165 lb).     Return in about 6 months (around 8/13/2024) for Chronic Disease Management, Medication review.      Alonzo Caal is a 78 year old, presenting for the following health issues:  Lipids and Hypertension    HPI     Hyperlipidemia Follow-Up    Are you regularly taking any medication or supplement to lower your cholesterol?   Yes- Zocor  Are you having muscle aches or other side effects that you think could be caused by your cholesterol lowering medication?  Yes- muscle cramps at night     Hypertension Follow-up    Do you check your blood pressure regularly outside of the clinic? Yes   Are you following a low salt diet? Yes  Are your blood pressures ever more than 140 on the top number (systolic) OR more   than 90 on the bottom number (diastolic), for example 140/90? No  Patient does check at home, readings are generally normal.    Chronic Kidney Disease Follow-up    Do you take any over the counter pain medicine?: Yes  What over the counter medicine are you taking for your pain?:  Tylenol    How often do you take this medicine?:   intermittently    Answers submitted by the patient for this visit:  Patient Health Questionnaire (Submitted on 2/13/2024)  If you checked off any problems, how difficult have these problems made it for you to do your work, take care of things at home, or get along with other " people?: Not difficult at all  PHQ9 TOTAL SCORE: 0  NAVYA-7 (Submitted on 2/13/2024)  NAVYA 7 TOTAL SCORE: 1        Review of Systems  Constitutional, HEENT, cardiovascular, pulmonary, gi and gu systems are negative, except as otherwise noted.      Objective    BP (!) 146/80 (BP Location: Right arm, Patient Position: Sitting, Cuff Size: Adult Regular)   Pulse 60   Temp 97.5  F (36.4  C) (Tympanic)   Resp 16   Wt 74.8 kg (165 lb)   SpO2 97%   BMI 25.84 kg/m    Body mass index is 25.84 kg/m .  Physical Exam   GENERAL: alert and no distress  PSYCH: mentation appears normal, affect normal/bright    Lab on 02/12/2024   Component Date Value Ref Range Status    Sodium 02/12/2024 138  135 - 145 mmol/L Final    Reference intervals for this test were updated on 09/26/2023 to more accurately reflect our healthy population. There may be differences in the flagging of prior results with similar values performed with this method. Interpretation of those prior results can be made in the context of the updated reference intervals.     Potassium 02/12/2024 4.6  3.4 - 5.3 mmol/L Final    Chloride 02/12/2024 101  98 - 107 mmol/L Final    Carbon Dioxide (CO2) 02/12/2024 26  22 - 29 mmol/L Final    Anion Gap 02/12/2024 11  7 - 15 mmol/L Final    Urea Nitrogen 02/12/2024 24.4 (H)  8.0 - 23.0 mg/dL Final    Creatinine 02/12/2024 1.34 (H)  0.67 - 1.17 mg/dL Final    GFR Estimate 02/12/2024 54 (L)  >60 mL/min/1.73m2 Final    Calcium 02/12/2024 9.4  8.8 - 10.2 mg/dL Final    Glucose 02/12/2024 98  70 - 99 mg/dL Final    Cholesterol 02/12/2024 195  <200 mg/dL Final    Triglycerides 02/12/2024 120  <150 mg/dL Final    Direct Measure HDL 02/12/2024 45  >=40 mg/dL Final    LDL Cholesterol Calculated 02/12/2024 126 (H)  <=100 mg/dL Final    Non HDL Cholesterol 02/12/2024 150 (H)  <130 mg/dL Final    Patient Fasting > 8hrs? 02/12/2024 Yes   Final    ALT 02/12/2024 32  0 - 70 U/L Final    Hold Specimen 02/12/2024 [facility]   Final           Signed  Electronically by: Hodan Tapia MD

## 2024-02-13 ENCOUNTER — OFFICE VISIT (OUTPATIENT)
Dept: FAMILY MEDICINE | Facility: OTHER | Age: 79
End: 2024-02-13
Attending: FAMILY MEDICINE
Payer: COMMERCIAL

## 2024-02-13 VITALS
DIASTOLIC BLOOD PRESSURE: 80 MMHG | WEIGHT: 165 LBS | OXYGEN SATURATION: 97 % | RESPIRATION RATE: 16 BRPM | SYSTOLIC BLOOD PRESSURE: 146 MMHG | HEART RATE: 60 BPM | BODY MASS INDEX: 25.84 KG/M2 | TEMPERATURE: 97.5 F

## 2024-02-13 DIAGNOSIS — E78.00 HYPERCHOLESTEROLEMIA: Primary | ICD-10-CM

## 2024-02-13 DIAGNOSIS — N18.31 CHRONIC KIDNEY DISEASE, STAGE 3A (H): ICD-10-CM

## 2024-02-13 DIAGNOSIS — Z12.5 SCREENING FOR MALIGNANT NEOPLASM OF PROSTATE: ICD-10-CM

## 2024-02-13 DIAGNOSIS — I10 BENIGN ESSENTIAL HYPERTENSION: ICD-10-CM

## 2024-02-13 PROCEDURE — 99214 OFFICE O/P EST MOD 30 MIN: CPT | Performed by: FAMILY MEDICINE

## 2024-02-13 PROCEDURE — G0463 HOSPITAL OUTPT CLINIC VISIT: HCPCS

## 2024-02-13 ASSESSMENT — ANXIETY QUESTIONNAIRES
6. BECOMING EASILY ANNOYED OR IRRITABLE: NOT AT ALL
GAD7 TOTAL SCORE: 1
3. WORRYING TOO MUCH ABOUT DIFFERENT THINGS: NOT AT ALL
2. NOT BEING ABLE TO STOP OR CONTROL WORRYING: NOT AT ALL
5. BEING SO RESTLESS THAT IT IS HARD TO SIT STILL: SEVERAL DAYS
8. IF YOU CHECKED OFF ANY PROBLEMS, HOW DIFFICULT HAVE THESE MADE IT FOR YOU TO DO YOUR WORK, TAKE CARE OF THINGS AT HOME, OR GET ALONG WITH OTHER PEOPLE?: NOT DIFFICULT AT ALL
7. FEELING AFRAID AS IF SOMETHING AWFUL MIGHT HAPPEN: NOT AT ALL
1. FEELING NERVOUS, ANXIOUS, OR ON EDGE: NOT AT ALL
GAD7 TOTAL SCORE: 1
IF YOU CHECKED OFF ANY PROBLEMS ON THIS QUESTIONNAIRE, HOW DIFFICULT HAVE THESE PROBLEMS MADE IT FOR YOU TO DO YOUR WORK, TAKE CARE OF THINGS AT HOME, OR GET ALONG WITH OTHER PEOPLE: NOT DIFFICULT AT ALL
GAD7 TOTAL SCORE: 1
7. FEELING AFRAID AS IF SOMETHING AWFUL MIGHT HAPPEN: NOT AT ALL
4. TROUBLE RELAXING: NOT AT ALL

## 2024-02-13 ASSESSMENT — PAIN SCALES - GENERAL: PAINLEVEL: NO PAIN (0)

## 2024-02-13 ASSESSMENT — PATIENT HEALTH QUESTIONNAIRE - PHQ9
SUM OF ALL RESPONSES TO PHQ QUESTIONS 1-9: 0
SUM OF ALL RESPONSES TO PHQ QUESTIONS 1-9: 0
10. IF YOU CHECKED OFF ANY PROBLEMS, HOW DIFFICULT HAVE THESE PROBLEMS MADE IT FOR YOU TO DO YOUR WORK, TAKE CARE OF THINGS AT HOME, OR GET ALONG WITH OTHER PEOPLE: NOT DIFFICULT AT ALL

## 2024-02-19 DIAGNOSIS — M79.671 RIGHT FOOT PAIN: ICD-10-CM

## 2024-02-19 DIAGNOSIS — E78.00 HYPERCHOLESTEROLEMIA: ICD-10-CM

## 2024-02-19 RX ORDER — SIMVASTATIN 40 MG
40 TABLET ORAL AT BEDTIME
Qty: 90 TABLET | Refills: 3 | Status: SHIPPED | OUTPATIENT
Start: 2024-02-19

## 2024-02-19 RX ORDER — MELOXICAM 7.5 MG/1
TABLET ORAL
Qty: 90 TABLET | Refills: 1 | Status: SHIPPED | OUTPATIENT
Start: 2024-02-19 | End: 2024-08-13

## 2024-02-19 NOTE — TELEPHONE ENCOUNTER
Meloxicam    NSAID Medications Pnyopf0202/19/2024 12:31 AM   Protocol Details Blood pressure under 140/90 in past 12 months    Normal AST on file in past 12 months    Patient is age 6-64 years    Normal CBC on file in past 12 months    Always Fail Criteria - Chart Review Required    Normal GFR on file in past 12 months    Normal serum creatinine on file in past 12 months     BP Readings from Last 3 Encounters:   02/13/24 (!) 146/80   12/06/23 134/78   08/11/23 130/70     CBC RESULTS:   Recent Labs   Lab Test 10/04/16  1433   WBC 9.9   RBC 5.01   HGB 15.3   HCT 44.8   MCV 89   MCH 30.5   MCHC 34.2   RDW 12.3        AST   Date Value Ref Range Status   07/26/2019 33 0 - 45 U/L Final       Creatinine   Date Value Ref Range Status   02/12/2024 1.34 (H) 0.67 - 1.17 mg/dL Final   02/03/2021 1.29 (H) 0.66 - 1.25 mg/dL Final     GFR Estimate   Date Value Ref Range Status   02/12/2024 54 (L) >60 mL/min/1.73m2 Final   02/03/2021 53 (L) >60 mL/min/[1.73_m2] Final     Comment:     Non  GFR Calc  Starting 12/18/2018, serum creatinine based estimated GFR (eGFR) will be   calculated using the Chronic Kidney Disease Epidemiology Collaboration   (CKD-EPI) equation.       GFR Estimate If Black   Date Value Ref Range Status   02/03/2021 62 >60 mL/min/[1.73_m2] Final     Comment:      GFR Calc  Starting 12/18/2018, serum creatinine based estimated GFR (eGFR) will be   calculated using the Chronic Kidney Disease Epidemiology Collaboration   (CKD-EPI) equation.

## 2024-02-19 NOTE — TELEPHONE ENCOUNTER
SIMVASTATIN      Last Written Prescription Date:  4-28-23  Last Fill Quantity: 90,   # refills: 2  Last Office Visit: 2-13-24  Future Office visit:       Routing refill request to provider for review/approval because:      MELOXICAM      Last Written Prescription Date:  4-14-23  Last Fill Quantity: 90,   # refills: 1  Last Office Visit: 2-13-24  Future Office visit:       Routing refill request to provider for review/approval because:  Drug not on the [facility] or [facility] refill protocol or controlled substance

## 2024-04-25 DIAGNOSIS — I10 BENIGN ESSENTIAL HYPERTENSION: ICD-10-CM

## 2024-04-25 DIAGNOSIS — F41.9 ANXIETY: ICD-10-CM

## 2024-04-25 NOTE — TELEPHONE ENCOUNTER
Metoprolol       Last Written Prescription Date:  7/25/2023  Last Fill Quantity: 90,   # refills: 2  Last Office Visit: 2/13/2024  Future Office visit:

## 2024-04-26 RX ORDER — METOPROLOL SUCCINATE 25 MG/1
TABLET, EXTENDED RELEASE ORAL
Qty: 90 TABLET | Refills: 1 | Status: SHIPPED | OUTPATIENT
Start: 2024-04-26

## 2024-04-26 NOTE — TELEPHONE ENCOUNTER
Beta-Blockers Protocol [id number]/25/2024 12:39 AM   Protocol Details Blood pressure under 140/90 in past 12 months        BP Readings from Last 3 Encounters:   02/13/24 (!) 146/80   12/06/23 134/78   08/11/23 130/70

## 2024-07-11 NOTE — PROGRESS NOTES
Assessment & Plan     1. Chronic pain of both shoulders  Patient tolerated injections well, monitor for signs of infection.  Follow-up as needed.  - Large Joint/Bursa injection and/or drainage (Shoulder, Knee)  - triamcinolone (KENALOG-40) injection 80 mg  - lidocaine 1 % injection 8 mL      Return if symptoms worsen or fail to improve.      Subjective   Ten is a 79 year old, presenting for the following health issues:  Musculoskeletal Problem    HPI     Pain History:  When did you first notice your pain? 2 months   Have you seen anyone else for your pain? No  How has your pain affected your ability to work? Not applicable  Where in your body do you have pain? Musculoskeletal problem/pain  Onset/Duration: 2 months  Description  Location: shoulder - bilateral  Joint Swelling: No  Redness: No  Pain: YES  Warmth: No  Intensity:  mild to moderate  Progression of Symptoms:  same  Accompanying signs and symptoms:   Fevers: No  Numbness/tingling/weakness: No  History  Trauma to the area: No  Recent illness:  No  Previous similar problem: YES  Previous evaluation:  YES  Precipitating or alleviating factors:  Aggravating factors include: overuse  Therapies tried and outcome: rest/inactivity, acetaminophen, and steroid injections    Patient's last injections were in December.  Patient states pain has been acting up for the past couple of months as he started kayaking more (loading and unloading kayak into truck, etc).          Review of Systems  Constitutional, HEENT, cardiovascular, pulmonary, gi and gu systems are negative, except as otherwise noted.      Objective    /72 (BP Location: Right arm, Patient Position: Sitting, Cuff Size: Adult Regular)   Pulse 66   Temp 97.8  F (36.6  C) (Tympanic)   Resp 18   Wt 73.1 kg (161 lb 3.2 oz)   SpO2 97%   BMI 25.25 kg/m    Body mass index is 25.25 kg/m .  Physical Exam   GENERAL: alert and no distress  PSYCH: mentation appears normal, affect  normal/bright    Procedure: steroid injection of left shoulder using posterior approach  Indication: pain, arthritis  Consent: verbal and written consent was obtained from patient after risks and benefits are discussed  Description:  Time out procedure is completed.  Joint space is identified and marked.  Betadine prep is completed.  Ethyl chloride spray is used for topical anesthesia.  1 cc Kenalog 40mg/cc and 4 cc 1% Lidocaine without epinephrine are injected without difficulty.  Patient tolerated procedure well.    Procedure: steroid injection of right shoulder using posterior approach  Indication: pain, arthritis  Consent: verbal and written consent was obtained from patient after risks and benefits are discussed  Description:  Time out procedure is completed.  Joint space is identified and marked.  Betadine prep is completed.  Ethyl chloride spray is used for topical anesthesia.  1 cc Kenalog 40mg/cc and 4 cc 1% Lidocaine without epinephrine are injected without difficulty.  Patient tolerated procedure well.          Signed Electronically by: Hodan Tapia MD

## 2024-07-15 ENCOUNTER — OFFICE VISIT (OUTPATIENT)
Dept: FAMILY MEDICINE | Facility: OTHER | Age: 79
End: 2024-07-15
Attending: FAMILY MEDICINE
Payer: COMMERCIAL

## 2024-07-15 VITALS
RESPIRATION RATE: 18 BRPM | OXYGEN SATURATION: 97 % | SYSTOLIC BLOOD PRESSURE: 135 MMHG | BODY MASS INDEX: 25.25 KG/M2 | TEMPERATURE: 97.8 F | WEIGHT: 161.2 LBS | DIASTOLIC BLOOD PRESSURE: 72 MMHG | HEART RATE: 66 BPM

## 2024-07-15 DIAGNOSIS — G89.29 CHRONIC PAIN OF BOTH SHOULDERS: Primary | ICD-10-CM

## 2024-07-15 DIAGNOSIS — M25.512 CHRONIC PAIN OF BOTH SHOULDERS: Primary | ICD-10-CM

## 2024-07-15 DIAGNOSIS — M25.511 CHRONIC PAIN OF BOTH SHOULDERS: Primary | ICD-10-CM

## 2024-07-15 PROCEDURE — G0463 HOSPITAL OUTPT CLINIC VISIT: HCPCS | Mod: 25

## 2024-07-15 PROCEDURE — 250N000011 HC RX IP 250 OP 636: Mod: JZ | Performed by: FAMILY MEDICINE

## 2024-07-15 PROCEDURE — 20610 DRAIN/INJ JOINT/BURSA W/O US: CPT | Mod: 50 | Performed by: FAMILY MEDICINE

## 2024-07-15 RX ORDER — LIDOCAINE HYDROCHLORIDE 10 MG/ML
8 INJECTION, SOLUTION INFILTRATION; PERINEURAL ONCE
Status: COMPLETED | OUTPATIENT
Start: 2024-07-15 | End: 2024-07-15

## 2024-07-15 RX ORDER — TRIAMCINOLONE ACETONIDE 40 MG/ML
80 INJECTION, SUSPENSION INTRA-ARTICULAR; INTRAMUSCULAR ONCE
Status: COMPLETED | OUTPATIENT
Start: 2024-07-15 | End: 2024-07-15

## 2024-07-15 RX ADMIN — LIDOCAINE HYDROCHLORIDE 8 ML: 10 INJECTION, SOLUTION INFILTRATION; PERINEURAL at 09:51

## 2024-07-15 RX ADMIN — TRIAMCINOLONE ACETONIDE 80 MG: 40 INJECTION, SUSPENSION INTRA-ARTICULAR; INTRAMUSCULAR at 09:51

## 2024-07-15 ASSESSMENT — PAIN SCALES - GENERAL: PAINLEVEL: MILD PAIN (3)

## 2024-07-31 DIAGNOSIS — H91.93 DECREASED HEARING OF BOTH EARS: Primary | ICD-10-CM

## 2024-08-07 ENCOUNTER — OFFICE VISIT (OUTPATIENT)
Dept: AUDIOLOGY | Facility: OTHER | Age: 79
End: 2024-08-07
Attending: AUDIOLOGIST
Payer: COMMERCIAL

## 2024-08-07 DIAGNOSIS — H91.93 DECREASED HEARING OF BOTH EARS: ICD-10-CM

## 2024-08-07 DIAGNOSIS — H90.3 SENSORINEURAL HEARING LOSS (SNHL) OF BOTH EARS: Primary | ICD-10-CM

## 2024-08-07 DIAGNOSIS — H91.93 DECREASED HEARING OF BOTH EARS: Primary | ICD-10-CM

## 2024-08-07 PROCEDURE — V5299 HEARING SERVICE: HCPCS

## 2024-08-07 PROCEDURE — 92552 PURE TONE AUDIOMETRY AIR: CPT | Performed by: AUDIOLOGIST

## 2024-08-07 PROCEDURE — 92556 SPEECH AUDIOMETRY COMPLETE: CPT | Performed by: AUDIOLOGIST

## 2024-08-07 NOTE — PROGRESS NOTES
HEARING AID CHECK    BACKGROUND:  Ten Flores, a 79 year old male, was seen today for an hearing aid check.  Mr. Flores wears Binaural Oticon OPN 3 hearing aids.  Mr. Flores reported no concerns.    FINDINGS:  Visual inspection and cleaning provided.   C-stop changed with new. 10mm DV domes changed with new. Battery was tested and good. Good listening check.    Reviewed cleaning, troubleshooting, and preventative care with patient. Any cleaning tools used were provided as customer courtesy.    Services performed and warranty reviewed with patient.    PLAN:  Patient seen next by audiologist. Mr. Flores will return to clinic in 12 months, sooner if needed. Patient had no further questions and reports satisfaction.         Lesly Cabello  Audiology Assistant  [facility]  [phone]

## 2024-08-07 NOTE — PROGRESS NOTES
Audiology Evaluation Completed. Please refer SCANNED AUDIOGRAM and/or TYMPANOGRAM for BACKGROUND, RESULTS, RECOMMENDATIONS.      Sadie FAIRBANKS, [facility]  Audiologist #[id number]

## 2024-08-13 ENCOUNTER — LAB (OUTPATIENT)
Dept: LAB | Facility: OTHER | Age: 79
End: 2024-08-13
Payer: COMMERCIAL

## 2024-08-13 DIAGNOSIS — N18.31 CHRONIC KIDNEY DISEASE, STAGE 3A (H): ICD-10-CM

## 2024-08-13 DIAGNOSIS — I10 BENIGN ESSENTIAL HYPERTENSION: ICD-10-CM

## 2024-08-13 DIAGNOSIS — Z12.5 SCREENING FOR MALIGNANT NEOPLASM OF PROSTATE: ICD-10-CM

## 2024-08-13 DIAGNOSIS — M79.671 RIGHT FOOT PAIN: ICD-10-CM

## 2024-08-13 DIAGNOSIS — E78.00 HYPERCHOLESTEROLEMIA: ICD-10-CM

## 2024-08-13 LAB
ALT SERPL W P-5'-P-CCNC: 28 U/L (ref 0–70)
ANION GAP SERPL CALCULATED.3IONS-SCNC: 12 MMOL/L (ref 7–15)
BUN SERPL-MCNC: 22 MG/DL (ref 8–23)
CALCIUM SERPL-MCNC: 9.8 MG/DL (ref 8.8–10.4)
CHLORIDE SERPL-SCNC: 102 MMOL/L (ref 98–107)
CHOLEST SERPL-MCNC: 175 MG/DL
CREAT SERPL-MCNC: 1.21 MG/DL (ref 0.67–1.17)
EGFRCR SERPLBLD CKD-EPI 2021: 61 ML/MIN/1.73M2
FASTING STATUS PATIENT QL REPORTED: YES
FASTING STATUS PATIENT QL REPORTED: YES
GLUCOSE SERPL-MCNC: 103 MG/DL (ref 70–99)
HCO3 SERPL-SCNC: 25 MMOL/L (ref 22–29)
HDLC SERPL-MCNC: 50 MG/DL
HOLD SPECIMEN: NORMAL
LDLC SERPL CALC-MCNC: 104 MG/DL
NONHDLC SERPL-MCNC: 125 MG/DL
POTASSIUM SERPL-SCNC: 4.5 MMOL/L (ref 3.4–5.3)
PSA SERPL DL<=0.01 NG/ML-MCNC: 1.93 NG/ML
SODIUM SERPL-SCNC: 139 MMOL/L (ref 135–145)
TRIGL SERPL-MCNC: 106 MG/DL

## 2024-08-13 PROCEDURE — G0103 PSA SCREENING: HCPCS | Mod: ZL

## 2024-08-13 PROCEDURE — 84460 ALANINE AMINO (ALT) (SGPT): CPT | Mod: ZL

## 2024-08-13 PROCEDURE — 36415 COLL VENOUS BLD VENIPUNCTURE: CPT | Mod: ZL

## 2024-08-13 PROCEDURE — 80048 BASIC METABOLIC PNL TOTAL CA: CPT | Mod: ZL

## 2024-08-13 PROCEDURE — 80061 LIPID PANEL: CPT | Mod: ZL

## 2024-08-13 RX ORDER — MELOXICAM 7.5 MG/1
TABLET ORAL
Qty: 90 TABLET | Refills: 1 | Status: SHIPPED | OUTPATIENT
Start: 2024-08-13

## 2024-08-13 NOTE — TELEPHONE ENCOUNTER
meloxicam (MOBIC) 7.5 MG tablet 90 tablet 1 2/19/2024     Last Office Visit: 07/15/2024  Future Office visit:    Next 5 appointments (look out 90 days)      Aug 15, 2024 8:30 AM  (Arrive by 8:15 AM)  SHORT with Hodan Tapia MD  [facility] ([facility] ) [address]  [facility] [address]  [phone]             Routing refill request to provider for review/approval because:

## 2024-08-13 NOTE — PROGRESS NOTES
Assessment & Plan     1. Hypercholesterolemia  No changes to current medication, will refill current medication when due.  Follow-up in six months, sooner as needed.    2. Benign essential hypertension  As above    3. Chronic kidney disease, stage 3a (H)  Urine labs updated today  - Albumin Random Urine Quantitative with Creat Ratio; Future  - UA Macroscopic with reflex to Microscopic and Culture; Future  - Albumin Random Urine Quantitative with Creat Ratio  - UA Macroscopic with reflex to Microscopic and Culture    4. Hip pain, right  Refilled for rare use.  - acetaminophen-codeine (TYLENOL #3) 300-30 MG per tablet; Take 1 tablet by mouth every 6 hours as needed for severe pain maximum 6 tablet(s) per day  Dispense: 10 tablet; Refill: 0       The longitudinal plan of care for the diagnosis(es)/condition(s) as documented were addressed during this visit. Due to the added complexity in care, I will continue to support Ten in the subsequent management and with ongoing continuity of care.       Return in about 6 months (around 2/15/2025) for Chronic Disease Management, Medication review.      Subjective   Ten is a 79 year old, presenting for the following health issues:  Hypertension and Lipids    HPI     Hyperlipidemia Follow-Up    Are you regularly taking any medication or supplement to lower your cholesterol?   Yes- Zocor  Are you having muscle aches or other side effects that you think could be caused by your cholesterol lowering medication?  No    Hypertension Follow-up    Do you check your blood pressure regularly outside of the clinic? No   Are you following a low salt diet? No  Are your blood pressures ever more than 140 on the top number (systolic) OR more   than 90 on the bottom number (diastolic), for example 140/90? No      Chronic Kidney Disease Follow-up    Do you take any over the counter pain medicine?: No        Review of Systems  Constitutional, HEENT, cardiovascular, pulmonary, gi and gu  systems are negative, except as otherwise noted.      Objective    /80 (BP Location: Right arm, Patient Position: Sitting, Cuff Size: Adult Regular)   Pulse 62   Temp 97.3  F (36.3  C) (Tympanic)   Resp 18   Wt 72 kg (158 lb 11.2 oz)   SpO2 97%   BMI 24.86 kg/m    Body mass index is 24.86 kg/m .  Physical Exam   GENERAL: alert and no distress  PSYCH: mentation appears normal, affect normal/bright      Lab on 08/13/2024   Component Date Value Ref Range Status    Sodium 08/13/2024 139  135 - 145 mmol/L Final    Potassium 08/13/2024 4.5  3.4 - 5.3 mmol/L Final    Chloride 08/13/2024 102  98 - 107 mmol/L Final    Carbon Dioxide (CO2) 08/13/2024 25  22 - 29 mmol/L Final    Anion Gap 08/13/2024 12  7 - 15 mmol/L Final    Urea Nitrogen 08/13/2024 22.0  8.0 - 23.0 mg/dL Final    Creatinine 08/13/2024 1.21 (H)  0.67 - 1.17 mg/dL Final    GFR Estimate 08/13/2024 61  >60 mL/min/1.73m2 Final    eGFR calculated using 2021 CKD-EPI equation.    Calcium 08/13/2024 9.8  8.8 - 10.4 mg/dL Final    Reference intervals for this test were updated on 7/16/2024 to reflect our healthy population more accurately. There may be differences in the flagging of prior results with similar values performed with this method. Those prior results can be interpreted in the context of the updated reference intervals.    Glucose 08/13/2024 103 (H)  70 - 99 mg/dL Final    Patient Fasting > 8hrs? 08/13/2024 Yes   Final    ALT 08/13/2024 28  0 - 70 U/L Final    Cholesterol 08/13/2024 175  <200 mg/dL Final    Triglycerides 08/13/2024 106  <150 mg/dL Final    Direct Measure HDL 08/13/2024 50  >=40 mg/dL Final    LDL Cholesterol Calculated 08/13/2024 104 (H)  <=100 mg/dL Final    Non HDL Cholesterol 08/13/2024 125  <130 mg/dL Final    Patient Fasting > 8hrs? 08/13/2024 Yes   Final    Prostate Specific Antigen Screen 08/13/2024 1.93  ng/mL Final    No reference ranges have been established for patients over 80 years.    Hold Specimen 08/13/2024 [facility]    Final            Signed Electronically by: Hodan Tapia MD

## 2024-08-13 NOTE — TELEPHONE ENCOUNTER
NSAID Medications [id number]/13/2024 12:50 AM   Protocol Details Patient is age 6-64 years    Normal CBC on file in past 12 months    Always Fail Criteria - Chart Review Required    Normal GFR on file in past 12 months   CBC RESULTS:   Recent Labs   Lab Test 10/04/16  1433   WBC 9.9   RBC 5.01   HGB 15.3   HCT 44.8   MCV 89   MCH 30.5   MCHC 34.2   RDW 12.3        GFR Estimate   Date Value Ref Range Status   08/13/2024 61 >60 mL/min/1.73m2 Final     Comment:     eGFR calculated using 2021 CKD-EPI equation.   02/03/2021 53 (L) >60 mL/min/[1.73_m2] Final     Comment:     Non  GFR Calc  Starting 12/18/2018, serum creatinine based estimated GFR (eGFR) will be   calculated using the Chronic Kidney Disease Epidemiology Collaboration   (CKD-EPI) equation.

## 2024-08-15 ENCOUNTER — OFFICE VISIT (OUTPATIENT)
Dept: FAMILY MEDICINE | Facility: OTHER | Age: 79
End: 2024-08-15
Attending: FAMILY MEDICINE
Payer: COMMERCIAL

## 2024-08-15 VITALS
DIASTOLIC BLOOD PRESSURE: 80 MMHG | RESPIRATION RATE: 18 BRPM | BODY MASS INDEX: 24.86 KG/M2 | TEMPERATURE: 97.3 F | SYSTOLIC BLOOD PRESSURE: 132 MMHG | HEART RATE: 62 BPM | WEIGHT: 158.7 LBS | OXYGEN SATURATION: 97 %

## 2024-08-15 DIAGNOSIS — E78.00 HYPERCHOLESTEROLEMIA: Primary | ICD-10-CM

## 2024-08-15 DIAGNOSIS — I10 BENIGN ESSENTIAL HYPERTENSION: ICD-10-CM

## 2024-08-15 DIAGNOSIS — M25.551 HIP PAIN, RIGHT: ICD-10-CM

## 2024-08-15 DIAGNOSIS — N18.31 CHRONIC KIDNEY DISEASE, STAGE 3A (H): ICD-10-CM

## 2024-08-15 LAB
ALBUMIN UR-MCNC: NEGATIVE MG/DL
APPEARANCE UR: CLEAR
BILIRUB UR QL STRIP: NEGATIVE
COLOR UR AUTO: YELLOW
CREAT UR-MCNC: 80.9 MG/DL
GLUCOSE UR STRIP-MCNC: NEGATIVE MG/DL
HGB UR QL STRIP: NEGATIVE
KETONES UR STRIP-MCNC: NEGATIVE MG/DL
LEUKOCYTE ESTERASE UR QL STRIP: NEGATIVE
MICROALBUMIN UR-MCNC: <12 MG/L
MICROALBUMIN/CREAT UR: NORMAL MG/G{CREAT}
NITRATE UR QL: NEGATIVE
PH UR STRIP: 6 [PH] (ref 5–7)
SP GR UR STRIP: 1.02 (ref 1–1.03)
UROBILINOGEN UR STRIP-ACNC: 0.2 E.U./DL

## 2024-08-15 PROCEDURE — G0463 HOSPITAL OUTPT CLINIC VISIT: HCPCS

## 2024-08-15 PROCEDURE — 99214 OFFICE O/P EST MOD 30 MIN: CPT | Performed by: FAMILY MEDICINE

## 2024-08-15 PROCEDURE — G2211 COMPLEX E/M VISIT ADD ON: HCPCS | Performed by: FAMILY MEDICINE

## 2024-08-15 PROCEDURE — 81003 URINALYSIS AUTO W/O SCOPE: CPT | Mod: ZL | Performed by: FAMILY MEDICINE

## 2024-08-15 PROCEDURE — 82570 ASSAY OF URINE CREATININE: CPT | Mod: ZL | Performed by: FAMILY MEDICINE

## 2024-08-15 RX ORDER — ACETAMINOPHEN AND CODEINE PHOSPHATE 300; 30 MG/1; MG/1
1 TABLET ORAL EVERY 6 HOURS PRN
Qty: 10 TABLET | Refills: 0 | Status: SHIPPED | OUTPATIENT
Start: 2024-08-15

## 2024-08-15 ASSESSMENT — PAIN SCALES - GENERAL: PAINLEVEL: MODERATE PAIN (4)

## 2024-09-22 ENCOUNTER — NURSE TRIAGE (OUTPATIENT)
Dept: NURSING | Facility: CLINIC | Age: 79
End: 2024-09-22

## 2024-09-22 NOTE — TELEPHONE ENCOUNTER
Daughter Tati calling to report patient tested positive for Covid with mild symptoms that started today, and is calling for Paxlovid.  Caller is not with patient and does not have consent to communicate on file.  Caller will have patient call back for triage.    Keren Goodwin RN  [facility]    Reason for Disposition    [1] Caller is not with the adult (patient) AND [2] probable NON-URGENT symptoms    Protocols used: Information Only Call - No Triage-A-

## 2024-09-23 ENCOUNTER — TELEPHONE (OUTPATIENT)
Dept: CARE COORDINATION | Facility: OTHER | Age: 79
End: 2024-09-23

## 2024-09-23 NOTE — TELEPHONE ENCOUNTER
"Call placed to patient to discuss positive covid 19 results.   Underlying Medical Conditions: Age  Patient testing positive on 9/21   Test by:  at home covid test  Start of Symptoms: 9/21    COVID-19 Symptom Review    New or worsening difficulty breathing? no  Cough? No     Dry or Productive?  NA  Fever?  Did not take temp     Highest temp past 24 hours?  NA  Chills?  yes  Headache:  no  Sore throat: yes  Chest pain: no  Diarrhea: no  Body aches?  no  Nasal congestion or drainage?  both       Appointment Scheduled:  yes, 9/23     Pharmacy: [facility]        Instructions below provided to patient. Patient verbalized understanding.     Instructions for Patients  Your COVID-19 test was positive. This means you have the virus. Please follow the \"How can I take care of myself\" and \"How do I self-isolate?\" guidelines in these instructions.    What treatments are available?  Over-the-counter medicines may help with your symptoms such as runny or stuffy nose, cough, chills, and fever. Talk to your care team about your options.     Some people are at high risk for severe illness (for example if you have a weak immune system, you're 65 or older, or you have certain medical problems). If your risk it high and your symptoms started in the last 5 to 7 days, we strongly recommend for you to get COVID treatment as soon as possible before you get really sick. Paxlovid, Molnupiravir and the monoclonal antibody treatments are proven safe and effective, make you feel better faster, and prevent hospitalization and death.       What are the symptoms of COVID-19?  Symptoms can include fever, cough, shortness of breath, chills, headache, muscle pain sore throat, fatigue, runny or stuffy nose, and loss of taste and smell. Other less common symptoms include nausea, vomiting, or diarrhea (watery stools).    Know when to call 911. Emergency warning signs include:  Trouble breathing or shortness of breath  Pain or pressure in " the chest that doesn't go away  Feeling confused like you haven't felt before, or not being able to wake up  Bluish-colored lips or face    How can I take care of myself?  Get lots of rest. Drink extra fluids (unless a doctor has told you not to).  Take Tylenol (acetaminophen) for fever or pain. If you have liver or kidney problems, ask your family doctor if it's okay to take Tylenol   Adults:   650 mg (two 325 mg pills or tablets) every 4 to 6 hours, or...   1,000 mg (two 500 mg pills or tablets) every 8 hours as needed.  Note: Don't take more than 3,000 mg in one day. Acetaminophen is found in many medicines (both prescribed and over the counter). Read all labels to be sure you don't take too much.  For children, check the Tylenol bottle for the right dose. The dose is based on the child's age or weight.  Take over the counter medicines for your symptoms as needed. Talk to your pharmacist.  If you have other health problems (like cancer, heart failure, an organ transplant, or severe kidney disease): Call your specialty clinic if you don't feel better in the next 2 days.    These guidelines are for isolating and quarantining before returning to work, school or .   For employers, schools and day cares: This is an official notice for this person's medical guidelines for returning in-person.   For health care sites: The CDC gives different isolation and quarantine guidelines for healthcare sites, please check with these sites before arriving.     How do I self-isolate?  You isolate when you have symptoms of COVID or a test shows you have COVID, even if you don't have symptoms.   If you DO have symptoms:  Stay home and away from others  For at least 5 days after your symptoms started, AND   You are fever free for 24 hours (with no medicine that reduces fever), AND  Your other symptoms are better.  Wear a mask for 10 full days any time you are around others.  If you DON'T have symptoms:  Stay at home and away  from others for at least 5 days after your positive test.  Wear a mask for 10 full days any time you are around others.    How and when do I quarantine?  You quarantine when you may have been exposed to the virus and DON'T have symptoms.   Stay home and away from others.   You must quarantine for 5 days after your last contact with a person who has COVID.  Note: If you are fully vaccinated, you don't need to quarantine. You should still follow the steps below.   Wear a mask for 10 full days any time you're around others.  Get tested at least 5 days after you were exposed, even if you don't have symptoms.   If you start to have symptoms, isolate right away and get tested.    Where can I get more information?  [facility] COVID-19 Resource Hub: [email or web address]/[email or web address]   [facility] Quarantine & Isolation: https://www.cdc.gov/coronavirus/2019-ncov/your-health/quarantine-isolation.html   [facility] - What to Do If You're Sick: https://www.cdc.gov/coronavirus/2019-ncov/if-you-are-sick/index.html  [facility] clinical trials (COVID-19 research studies): [email or web address]  [place] Department of Health COVID-19 Public Hotline: 1-810.970.8139

## 2024-10-24 DIAGNOSIS — F41.9 ANXIETY: ICD-10-CM

## 2024-10-24 DIAGNOSIS — I10 BENIGN ESSENTIAL HYPERTENSION: ICD-10-CM

## 2024-10-24 RX ORDER — METOPROLOL SUCCINATE 25 MG/1
TABLET, EXTENDED RELEASE ORAL
Qty: 90 TABLET | Refills: 3 | Status: SHIPPED | OUTPATIENT
Start: 2024-10-24

## 2025-05-31 DIAGNOSIS — E78.00 HYPERCHOLESTEROLEMIA: ICD-10-CM

## 2025-06-01 ENCOUNTER — HEALTH MAINTENANCE LETTER (OUTPATIENT)
Age: 80
End: 2025-06-01

## 2025-06-02 RX ORDER — SIMVASTATIN 40 MG
40 TABLET ORAL AT BEDTIME
Qty: 90 TABLET | Refills: 0 | Status: SHIPPED | OUTPATIENT
Start: 2025-06-02

## 2025-06-24 ENCOUNTER — RESULTS FOLLOW-UP (OUTPATIENT)
Dept: FAMILY MEDICINE | Facility: OTHER | Age: 80
End: 2025-06-24

## 2025-06-24 ENCOUNTER — LAB (OUTPATIENT)
Dept: LAB | Facility: OTHER | Age: 80
End: 2025-06-24
Payer: COMMERCIAL

## 2025-06-24 DIAGNOSIS — N18.31 CHRONIC KIDNEY DISEASE, STAGE 3A (H): ICD-10-CM

## 2025-06-24 DIAGNOSIS — E78.00 HYPERCHOLESTEROLEMIA: ICD-10-CM

## 2025-06-24 DIAGNOSIS — I10 BENIGN ESSENTIAL HYPERTENSION: ICD-10-CM

## 2025-06-24 LAB
ALT SERPL W P-5'-P-CCNC: 28 U/L (ref 0–70)
ANION GAP SERPL CALCULATED.3IONS-SCNC: 12 MMOL/L (ref 7–15)
BUN SERPL-MCNC: 23.3 MG/DL (ref 8–23)
CALCIUM SERPL-MCNC: 10 MG/DL (ref 8.8–10.4)
CHLORIDE SERPL-SCNC: 103 MMOL/L (ref 98–107)
CHOLEST SERPL-MCNC: 124 MG/DL
CREAT SERPL-MCNC: 1.3 MG/DL (ref 0.67–1.17)
EGFRCR SERPLBLD CKD-EPI 2021: 55 ML/MIN/1.73M2
FASTING STATUS PATIENT QL REPORTED: YES
FASTING STATUS PATIENT QL REPORTED: YES
GLUCOSE SERPL-MCNC: 98 MG/DL (ref 70–99)
HCO3 SERPL-SCNC: 25 MMOL/L (ref 22–29)
HDLC SERPL-MCNC: 33 MG/DL
LDLC SERPL CALC-MCNC: 74 MG/DL
NONHDLC SERPL-MCNC: 91 MG/DL
POTASSIUM SERPL-SCNC: 4.4 MMOL/L (ref 3.4–5.3)
SODIUM SERPL-SCNC: 140 MMOL/L (ref 135–145)
TRIGL SERPL-MCNC: 86 MG/DL

## 2025-06-24 PROCEDURE — 84460 ALANINE AMINO (ALT) (SGPT): CPT | Mod: ZL

## 2025-06-24 PROCEDURE — 3048F LDL-C <100 MG/DL: CPT

## 2025-06-24 PROCEDURE — 80061 LIPID PANEL: CPT | Mod: ZL

## 2025-06-24 PROCEDURE — 36415 COLL VENOUS BLD VENIPUNCTURE: CPT | Mod: ZL

## 2025-06-24 PROCEDURE — 80048 BASIC METABOLIC PNL TOTAL CA: CPT | Mod: ZL

## 2025-06-27 ENCOUNTER — OFFICE VISIT (OUTPATIENT)
Dept: FAMILY MEDICINE | Facility: OTHER | Age: 80
End: 2025-06-27
Attending: FAMILY MEDICINE
Payer: COMMERCIAL

## 2025-06-27 VITALS
HEART RATE: 81 BPM | TEMPERATURE: 97.8 F | DIASTOLIC BLOOD PRESSURE: 77 MMHG | SYSTOLIC BLOOD PRESSURE: 142 MMHG | RESPIRATION RATE: 18 BRPM | WEIGHT: 155 LBS | OXYGEN SATURATION: 95 % | BODY MASS INDEX: 24.33 KG/M2 | HEIGHT: 67 IN

## 2025-06-27 DIAGNOSIS — N18.31 CHRONIC KIDNEY DISEASE, STAGE 3A (H): ICD-10-CM

## 2025-06-27 DIAGNOSIS — Z00.00 ENCOUNTER FOR MEDICARE ANNUAL WELLNESS EXAM: Primary | ICD-10-CM

## 2025-06-27 DIAGNOSIS — I63.81 CEREBROVASCULAR ACCIDENT (CVA) OF LEFT BASAL GANGLIA (H): ICD-10-CM

## 2025-06-27 DIAGNOSIS — I10 BENIGN ESSENTIAL HYPERTENSION: ICD-10-CM

## 2025-06-27 DIAGNOSIS — E78.00 HYPERCHOLESTEROLEMIA: ICD-10-CM

## 2025-06-27 PROCEDURE — G0463 HOSPITAL OUTPT CLINIC VISIT: HCPCS

## 2025-06-27 RX ORDER — TRAZODONE HYDROCHLORIDE 50 MG/1
50 TABLET ORAL AT BEDTIME
COMMUNITY

## 2025-06-27 RX ORDER — ASPIRIN 81 MG/1
81 TABLET ORAL DAILY
COMMUNITY

## 2025-06-27 RX ORDER — ATORVASTATIN CALCIUM 40 MG/1
40 TABLET, FILM COATED ORAL DAILY
COMMUNITY

## 2025-06-27 RX ORDER — LOSARTAN POTASSIUM 25 MG/1
25 TABLET ORAL DAILY
COMMUNITY

## 2025-06-27 RX ORDER — CLOPIDOGREL BISULFATE 75 MG/1
75 TABLET ORAL DAILY
COMMUNITY

## 2025-06-27 SDOH — HEALTH STABILITY: PHYSICAL HEALTH: ON AVERAGE, HOW MANY DAYS PER WEEK DO YOU ENGAGE IN MODERATE TO STRENUOUS EXERCISE (LIKE A BRISK WALK)?: 7 DAYS

## 2025-06-27 ASSESSMENT — ANXIETY QUESTIONNAIRES
GAD7 TOTAL SCORE: 17
1. FEELING NERVOUS, ANXIOUS, OR ON EDGE: MORE THAN HALF THE DAYS
6. BECOMING EASILY ANNOYED OR IRRITABLE: NEARLY EVERY DAY
GAD7 TOTAL SCORE: 17
7. FEELING AFRAID AS IF SOMETHING AWFUL MIGHT HAPPEN: NOT AT ALL
4. TROUBLE RELAXING: NEARLY EVERY DAY
7. FEELING AFRAID AS IF SOMETHING AWFUL MIGHT HAPPEN: NOT AT ALL
5. BEING SO RESTLESS THAT IT IS HARD TO SIT STILL: NEARLY EVERY DAY
3. WORRYING TOO MUCH ABOUT DIFFERENT THINGS: NEARLY EVERY DAY
IF YOU CHECKED OFF ANY PROBLEMS ON THIS QUESTIONNAIRE, HOW DIFFICULT HAVE THESE PROBLEMS MADE IT FOR YOU TO DO YOUR WORK, TAKE CARE OF THINGS AT HOME, OR GET ALONG WITH OTHER PEOPLE: NOT DIFFICULT AT ALL
2. NOT BEING ABLE TO STOP OR CONTROL WORRYING: NEARLY EVERY DAY
8. IF YOU CHECKED OFF ANY PROBLEMS, HOW DIFFICULT HAVE THESE MADE IT FOR YOU TO DO YOUR WORK, TAKE CARE OF THINGS AT HOME, OR GET ALONG WITH OTHER PEOPLE?: NOT DIFFICULT AT ALL
GAD7 TOTAL SCORE: 17

## 2025-06-27 ASSESSMENT — PATIENT HEALTH QUESTIONNAIRE - PHQ9
SUM OF ALL RESPONSES TO PHQ QUESTIONS 1-9: 14
SUM OF ALL RESPONSES TO PHQ QUESTIONS 1-9: 14
10. IF YOU CHECKED OFF ANY PROBLEMS, HOW DIFFICULT HAVE THESE PROBLEMS MADE IT FOR YOU TO DO YOUR WORK, TAKE CARE OF THINGS AT HOME, OR GET ALONG WITH OTHER PEOPLE: NOT DIFFICULT AT ALL

## 2025-06-27 ASSESSMENT — SOCIAL DETERMINANTS OF HEALTH (SDOH): HOW OFTEN DO YOU GET TOGETHER WITH FRIENDS OR RELATIVES?: THREE TIMES A WEEK

## 2025-06-27 ASSESSMENT — PAIN SCALES - GENERAL: PAINLEVEL_OUTOF10: MILD PAIN (1)

## 2025-06-27 NOTE — PROGRESS NOTES
Preventive Care Visit  [facility]  Hodan Tapia MD, Family Medicine  Jun 27, 2025      Assessment & Plan       ICD-10-CM    1. Encounter for Medicare annual wellness exam  Z00.00       2. Hypercholesterolemia  E78.00       3. Benign essential hypertension  I10       4. Chronic kidney disease, stage 3a (H)  N18.31       5. Cerebrovascular accident (CVA) of left basal ganglia (H)  I63.81         Chronic conditions - hyperlipidemia, HTN, and CKD - are stable.  Labs were completed prior to appointment and reviewed, will renew current medications when due.  Patient is still dealing with the aftermath of recent stroke. He will be working with speech, he is tolerating new medications well.    Patient has been advised of split billing requirements and indicates understanding: Yes        Depression Screening Follow Up        6/27/2025     9:52 AM   PHQ   PHQ-9 Total Score 14    Q9: Thoughts of better off dead/self-harm past 2 weeks More than half the days   F/U: Thoughts of suicide or self-harm Yes   F/U: Self harm-plan Yes   F/U: Self-harm action No   F/U: Safety concerns No       Patient-reported     Follow Up Actions Taken  Monitor symptoms with recovery from stroke    Discussed the following ways the patient can remain in a safe environment:  remove alcohol, remove things I could use to hurt myself:  , and be around others    Reviewed preventive health counseling, as reflected in patient instructions    Counseling  Appropriate preventive services were addressed with this patient via screening, questionnaire, or discussion as appropriate for fall prevention, nutrition, physical activity, Tobacco-use cessation, social engagement, weight loss and cognition.  Checklist reviewing preventive services available has been given to the patient.  Reviewed patient's diet, addressing concerns and/or questions.   He is at risk for psychosocial distress and has been provided with information to reduce risk.   Discussed possible  causes of fatigue. Addressed any concerns about safety while driving.  The patient was provided with written information regarding signs of hearing loss.   The patient's PHQ-9 score is consistent with moderate depression. He was provided with information regarding depression.       The longitudinal plan of care for the diagnosis(es)/condition(s) as documented were addressed during this visit. Due to the added complexity in care, I will continue to support Ten in the subsequent management and with ongoing continuity of care.    Follow-up  Return in about 3 months (around 9/27/2025) for Chronic Disease Management, Medication review.    Subjective   Ten is a 80 year old, presenting for the following:  Physical        6/27/2025     9:47 AM   Additional Questions   Roomed by Jayla DEJESUS   Accompanied by Daughter Mary JACK     Hyperlipidemia Follow-Up    Are you regularly taking any medication or supplement to lower your cholesterol?   Yes- Lipitor (was simvastatin, changed with recent stroke)  Are you having muscle aches or other side effects that you think could be caused by your cholesterol lowering medication?  No    Hypertension Follow-up    Do you check your blood pressure regularly outside of the clinic? No   Are you following a low salt diet? Yes  Are your blood pressures ever more than 140 on the top number (systolic) OR more   than 90 on the bottom number (diastolic), for example 140/90? N/A    BP Readings from Last 2 Encounters:   06/27/25 (!) 142/77   08/15/24 132/80     Chronic Kidney Disease Follow-up    Do you take any over the counter pain medicine?: No    Advance Care Planning    Discussed advance care planning with patient; however, patient declined at this time.        6/27/2025   General Health   How would you rate your overall physical health? Excellent   Feel stress (tense, anxious, or unable to sleep) Rather much   (!) STRESS CONCERN      6/27/2025   Nutrition   Diet: Low salt          6/27/2025   Exercise   Days per week of moderate/strenous exercise 7 days         6/27/2025   Social Factors   Frequency of gathering with friends or relatives Three times a week   Worry food won't last until get money to buy more No   Food not last or not have enough money for food? No   Do you have housing? (Housing is defined as stable permanent housing and does not include staying outside in a car, in a tent, in an abandoned building, in an overnight shelter, or couch-surfing.) Yes   Are you worried about losing your housing? No   Lack of transportation? No   Unable to get utilities (heat,electricity)? No         6/27/2025   Fall Risk   Fallen 2 or more times in the past year? No   Trouble with walking or balance? No          6/27/2025   Activities of Daily Living- Home Safety   Needs help with the following daily activites None of the above   Safety concerns in the home None of the above         6/27/2025   Dental   Dentist two times every year? Yes         6/27/2025   Hearing Screening   Hearing concerns? (!) I NEED TO ASK PEOPLE TO SPEAK UP OR REPEAT THEMSELVES.    (!) IT'S HARDER TO UNDERSTAND WOMEN'S VOICES THAN MEN'S VOICES.   Would you like a referral for hearing testing? No       Multiple values from one day are sorted in reverse-chronological order         6/27/2025   Driving Risk Screening   Patient/family members have concerns about driving (!) YES - recent stroke         6/27/2025   General Alertness/Fatigue Screening   Have you been more tired than usual lately? (!) YES         6/27/2025   Urinary Incontinence Screening   Bothered by leaking urine in past 6 months No       Today's PHQ-9 Score:       6/27/2025     9:52 AM   PHQ-9 SCORE   PHQ-9 Total Score MyChart 14 (Moderate depression)   PHQ-9 Total Score 14        Patient-reported         6/27/2025   Substance Use   Alcohol more than 3/day or more than 7/wk No   Do you have a current opioid prescription? No   How severe/bad is pain from 1 to 10?  8/10   Do you use any other substances recreationally? No     Social History     Tobacco Use    Smoking status: Former     Current packs/day: 0.00     Average packs/day: 0.5 packs/day for 5.0 years (2.5 ttl pk-yrs)     Types: Cigarettes     Start date: 1960     Quit date: 1965     Years since quittin.5     Passive exposure: Past    Smokeless tobacco: Former    Tobacco comments:     Tried to Quit (YES)   Vaping Use    Vaping status: Never Used   Substance Use Topics    Alcohol use: Yes     Alcohol/week: 0.0 standard drinks of alcohol     Comment: DAILY,wine,beer    Drug use: No         Fracture Risk Assessment Tool  Link to Frax Calculator  Use the information below to complete the Frax calculator  : 1945  Sex: male  Weight (kg): 70.3 kg (actual weight)  Height (cm): 170.2 cm  Previous Fragility Fracture:  No  History of parent with fractured hip:  No  Current Smoking:  No  Patient has been on glucocorticoids for more than 3 months (5mg/day or more): No  Rheumatoid Arthritis on Problem List:  No  Secondary Osteoporosis on Problem List:  No  Consumes 3 or more units of alcohol per day: No  Femoral Neck BMD (g/cm2)            Reviewed and updated as needed this visit by Provider   Tobacco  Allergies  Meds  Problems  Med Hx  Surg Hx  Fam Hx            Patient Active Problem List   Diagnosis    Advanced care planning/counseling discussion    Benign essential hypertension    Hypercholesterolemia    Hip pain, right    Chronic rhinitis    h/o Chewing tobacco nicotine dependence    Chronic, continuous use of opioids    Arthritis of shoulder    Spondylosis of lumbosacral region without myelopathy or radiculopathy    Chronic kidney disease, stage 3a (H)    Cerebrovascular accident (CVA) of left basal ganglia (H)     Past Surgical History:   Procedure Laterality Date    DERMATOLOGY IP CONSULT      GENITOURINARY SURGERY      circ    IR CONSULTATION FOR IR EXAM  2021    open reduction and internal   fixation > LEFT      TRANSPLANT         Social History     Tobacco Use    Smoking status: Former     Current packs/day: 0.00     Average packs/day: 0.5 packs/day for 5.0 years (2.5 ttl pk-yrs)     Types: Cigarettes     Start date: 1960     Quit date: 1965     Years since quittin.5     Passive exposure: Past    Smokeless tobacco: Former    Tobacco comments:     Tried to Quit (YES)   Substance Use Topics    Alcohol use: Yes     Alcohol/week: 0.0 standard drinks of alcohol     Comment: DAILY,wine,beer     Family History   Problem Relation Age of Onset    Heart Disease Mother 68        Disease - Cause of Death    Heart Disease Father 89        Disease - Cause of Death    Cancer Brother     Thrombosis Brother          Current Outpatient Medications   Medication Sig Dispense Refill    aspirin 81 MG EC tablet Take 81 mg by mouth daily.      atorvastatin (LIPITOR) 40 MG tablet Take 40 mg by mouth daily.      clopidogrel (PLAVIX) 75 MG tablet Take 75 mg by mouth daily.      fish oil-omega-3 fatty acids 1000 MG capsule Take 2 g by mouth daily       losartan (COZAAR) 25 MG tablet Take 25 mg by mouth daily.      melatonin 3 MG tablet Take 3 mg by mouth nightly as needed for sleep.      metoprolol succinate ER (TOPROL XL) 25 MG 24 hr tablet TAKE 1 TABLET BY MOUTH EVERY DAY 90 tablet 3    traZODone (DESYREL) 50 MG tablet Take 50 mg by mouth at bedtime.       Allergies   Allergen Reactions    Sulfasuccinamide Sodium Hives    Sulfa Antibiotics Rash     Current providers sharing in care for this patient include:  Patient Care Team:  Hodan Tapia MD as PCP - General (Family Practice)  Hodan Tapia MD as Assigned PCP  Sadie Romero AuD as Assigned Surgical Provider    The following health maintenance items are reviewed in Epic and correct as of today:  Health Maintenance   Topic Date Due    URINE DRUG SCREEN  Never done    CONTROLLED SUBSTANCE AGREEMENT FOR CHRONIC PAIN MANAGEMENT  Never done     "HEMOGLOBIN  10/04/2017    DTAP/TDAP/TD VACCINE (2 - Td or Tdap) 10/09/2023    ADVANCE CARE PLANNING  01/25/2024    COVID-19 VACCINE (8 - 2024-25 season) 04/25/2025    MICROALBUMIN  08/15/2025    NAVYA ASSESSMENT  12/27/2025    BMP  06/24/2026    LIPID  06/24/2026    MEDICARE ANNUAL WELLNESS VISIT  06/27/2026    FALL RISK ASSESSMENT  06/27/2026    PHQ-9  06/27/2026    PHQ-2 (once per calendar year)  Completed    INFLUENZA VACCINE  Completed    PNEUMOCOCCAL VACCINE 50+ YEARS  Completed    URINALYSIS  Completed    ZOSTER VACCINE  Completed    RSV VACCINE  Completed    HPV VACCINE  Aged Out    MENINGITIS VACCINE  Aged Out    COLORECTAL CANCER SCREENING  Discontinued         Review of Systems  Constitutional, HEENT, cardiovascular, pulmonary, gi and gu systems are negative, except as otherwise noted.     Objective    Exam  BP (!) 142/77 (BP Location: Right arm, Patient Position: Sitting, Cuff Size: Adult Regular)   Pulse 81   Temp 97.8  F (36.6  C) (Tympanic)   Resp 18   Ht 1.702 m (5' 7\")   Wt 70.3 kg (155 lb)   SpO2 95%   BMI 24.28 kg/m     Estimated body mass index is 24.28 kg/m  as calculated from the following:    Height as of this encounter: 1.702 m (5' 7\").    Weight as of this encounter: 70.3 kg (155 lb).    Physical Exam  GENERAL: alert and no distress  EYES: Eyes grossly normal to inspection, PERRL and conjunctivae and sclerae normal  HENT: ear canals and TM's normal, nose and mouth without ulcers or lesions  NECK: no adenopathy, no asymmetry, masses, or scars, and no carotid bruits  RESP: lungs clear to auscultation - no rales, rhonchi or wheezes  CV: regular rates and rhythm, normal S1 S2, no S3 or S4, and no murmur, click or rub  MS: no gross musculoskeletal defects noted, no edema  SKIN: no suspicious lesions or rashes  NEURO: Normal strength and tone, mentation intact and speech normal  PSYCH: mentation appears normal, affect normal/bright        6/27/2025   Mini Cog   Clock Draw Score 2 Normal   3 " Item Recall 0 objects recalled   Mini Cog Total Score 2       Lab on 06/24/2025   Component Date Value Ref Range Status    Sodium 06/24/2025 140  135 - 145 mmol/L Final    Potassium 06/24/2025 4.4  3.4 - 5.3 mmol/L Final    Chloride 06/24/2025 103  98 - 107 mmol/L Final    Carbon Dioxide (CO2) 06/24/2025 25  22 - 29 mmol/L Final    Anion Gap 06/24/2025 12  7 - 15 mmol/L Final    Urea Nitrogen 06/24/2025 23.3 (H)  8.0 - 23.0 mg/dL Final    Creatinine 06/24/2025 1.30 (H)  0.67 - 1.17 mg/dL Final    GFR Estimate 06/24/2025 55 (L)  >60 mL/min/1.73m2 Final    eGFR calculated using 2021 CKD-EPI equation.    Calcium 06/24/2025 10.0  8.8 - 10.4 mg/dL Final    Glucose 06/24/2025 98  70 - 99 mg/dL Final    Patient Fasting > 8hrs? 06/24/2025 Yes   Final    Cholesterol 06/24/2025 124  <200 mg/dL Final    Triglycerides 06/24/2025 86  <150 mg/dL Final    Direct Measure HDL 06/24/2025 33 (L)  >=40 mg/dL Final    LDL Cholesterol Calculated 06/24/2025 74  <100 mg/dL Final    LDL calculated using the Friedewald equation.    Non HDL Cholesterol 06/24/2025 91  <130 mg/dL Final    Patient Fasting > 8hrs? 06/24/2025 Yes   Final    ALT 06/24/2025 28  0 - 70 U/L Final           Signed Electronically by: Hodan Tapia MD

## 2025-07-03 ENCOUNTER — THERAPY VISIT (OUTPATIENT)
Dept: SPEECH THERAPY | Facility: HOSPITAL | Age: 80
End: 2025-07-03
Attending: FAMILY MEDICINE
Payer: COMMERCIAL

## 2025-07-03 DIAGNOSIS — R47.01 APHASIA: Primary | ICD-10-CM

## 2025-07-03 DIAGNOSIS — R47.02 DYSPHASIA: ICD-10-CM

## 2025-07-03 DIAGNOSIS — R47.1 DYSARTHRIA: ICD-10-CM

## 2025-07-03 DIAGNOSIS — I63.81 CEREBROVASCULAR ACCIDENT (CVA) OF LEFT BASAL GANGLIA (H): ICD-10-CM

## 2025-07-03 PROCEDURE — 92523 SPEECH SOUND LANG COMPREHEN: CPT | Mod: GN

## 2025-07-03 NOTE — PROGRESS NOTES
SPEECH LANGUAGE PATHOLOGY EVALUATION     Pt is an 80 year old male who was referred for a speech and language assessment after recent CVA. Pt expressed that he has a hard time naming objects and is not sure if the word that comes out of his mouth is the word he was looking for. Pt reported that he also experiences slurring and difficulty moving his mouth for words. Pt's daughter reported that he has been whispering since CVA, which is not his voice at baseline. Pt also presents with difficulty in increasing vocal volume.   Fall Risk Screen:  Have you fallen 2 or more times in the past year?: No  Have you fallen and had an injury in the past year?: No  Is patient receiving Physical Therapy Services?: Yes  Fall screen comments: Due to recent CVA at [facility]    Subjective        Presenting condition or subjective complaint: aphasia after stroke  Date of onset: 06/19/25    Relevant medical history: Arthritis; Hearing problems   Dates & types of surgery:   not reported    Prior diagnostic imaging/testing results:     not reported  Prior therapy history for the same diagnosis, illness or injury: No      Living Environment  Social support: With a significant other or spouse   Help at home: None  Equipment owned:   none    Employment: No    Hobbies/Interests: fishing,walking,    Patient goals for therapy: better communication -talking    Pain assessment: Pain denied     Objective     AUDITORY COMPREHENSION (understanding of spoken language)  One step commands: intact  Multi-step commands: intact  Biographical/personal yes/no questions: intact  Simple/factual yes/no questions: intact  Complex yes/no questions: intact  Picture identification - field of 2: intact  Picture identification - field of 4: intact  Comprehension of sentences: intact  Comprehension of paragraph: intact  Auditory comprehension level of impairment: no impairment     VERBAL EXPRESSION (use of spoken language to express information)  Automatic Speech  Tasks: counting: intact  days of the week: intact   Confrontational Naming: pictures: moderate impairment, achieved with semantic/function cues, achieved with written cues, achieved with phonemic cues   Responsive Naming: semantic/function: intact    Word Finding Skills: generative naming: moderate impairment   Repetition Skills: words: minimal impairment   sentences: intact   Narrative Speech: picture description: moderate impairment   Conversational Speech: connected speech: moderate impairment    Verbal expression level of impairment: moderate impairment    READING COMPREHENSION (understanding written language)  Oral Reading ability: letter/symbol level: intact  word level: intact  sentence level: moderate impairment   Reading comprehension: word level: minimal impairment, achieved with cues  sentence level: moderate impairment, achieved with cues  paragraph level: severe impairment   Reading comprehension level of impairment: moderate impairment    WRITTEN EXPRESSION (use of writing skills to express information)  Copying ability: letters/symbols intact  sentences minimal impairment   Written Expression: letters/symbols intact  word level severe impairment   Functional Tasks: name/signature minimal impairment   Written expression level of impairment: moderate to severe impairment    PRAGMATICS (the social or functional use of language)  Nonverbal skills: WNL   Verbal Skills: WNL   Pragmatics level of impairment: no impairment    ORAL MOTOR FUNCTION:  Dentition: natural dentition, adequate dentition  Secretion management: WFL  Mucosal quality: good  Mandibular function: intact  Oral labial function: impaired coordination on left, impaired coordination on right  Lingual function: impaired protrusion, impaired coordination  Velar function: unable to visualize   Buccal function: intact  Laryngeal function: impaired vocal quality: whispered throughout the session. Difficulty with increasing vocal volume.      MOTOR  SPEECH:  Speech Intelligibility:     Word level speech intelligibility: minimal impairment      Phrase/sentence level speech intelligibility: moderate impairment      Conversation level speech intelligibility: moderate impairment   Respiration Observations: WNL   Phonation: reduced loudness, breathy quality   Pitch glide: unable, reduced pitch range   Resonance: WNL  Rate/prosody: irregular pauses, slow overall rate   Articulation: imprecise articulation   Articulatory rate of movement (number of repetitions in 5 seconds): 2.3  Sequential motion rates (number of repetitions in 5 seconds): 2.4  Speech Fluency: moderate impairment, restarts/self-corrections   Dysarthria differential diagnosis: Hypokinetic/extrapyramidal   Motor speech level of impairment: moderate impairment     Assessment & Plan   CLINICAL IMPRESSIONS   Medical Diagnosis: dysphagia, dysarthria, CVA of left basal ganglia    Treatment Diagnosis: Hypokinetic dysarthria, Aphasia   Impression/Assessment: Pt is a 80 year old male with complaints of difficulty speaking and being able to be heard/understood. The following significant findings have been identified: impaired expressive communication, impaired receptive communication, and impaired speech intelligibility, characterized by hypokinetic dysarthria, deficits in verbal expression, written expression, and reading comprehension. Identified deficits interfere with their ability to communicate wants and needs, communicate within the home or community, and comprehend language  as compared to previous level of function. During the session, the pt was administered the [place] Diagnostic Aphasia Examination (BDAE) and the [place] Naming Test. Pt demonstrated ability to generative name objects during the [place] Naming Test in 7/15 opportunities when provided with moderate orthographic/semantic/phonemic cues. Pt expressed that since his stroke, he has had difficulty with naming items and being able to get out  what he wants to say. During the administration of the BDAE, the pt demonstrated strengths with simple social responses, word comprehension, following commands, answering simple/complex questions, automatic sequences, repetition of words/sentences, responsive naming, and screening of special categories. During the session, pt demonstrated difficulty with generative/confrontational naming, narrative/conversational speech with use of proper syntax and content words, oral reading of sentences, comprehension of sentences/paragraphs, mechanics of writing, writing regular phonics, writing common irregular form words. Pt presented with consistent articulation errors, quiet voice, and impaired motor speech abilities. Pt presented with significant difficulty with oral motor tasks, specifically with protrusion of tongue, tongue coordination, voicing, performing AMRs/SMRs. Pt presents with moderate impairments in verbal expression, motor speech, written expression, and written comprehension. Skilled speech therapy services are indicated at this time to increase the pt's ability to participate in ADLs and increase verbal expression. SLP and pt discussed assessment results and goals to target during treatment. Pt verbalized understanding and acceptance of assessment results and POC.     PLAN OF CARE  Treatment Interventions: Speech, Language     Prognosis to achieve stated therapy goals is good   Rehab potential is impacted by: lack of transportation    Long Term Goals:   SLP Goal 1  Goal Identifier: LTG 1  Goal Description: Pt will increase receptive and expressive abilities in order to improve participation in ADLs.  Rationale: To maximize functional communication within the home or community;To maximize the ability to communicate wants and needs within the home or community;To maximize language comprehension for interaction with caregivers or the environment  Goal Progress: Initial  Target Date: 07/03/26  SLP Goal 2  Goal  Identifier: LTG 2  Goal Description: Pt will increase speech intelligibility to 80% accuracy or higher in order to improve efficient communication across natural settings.  Rationale: To maximize functional communication within the home or community  Goal Progress: Initial  Target Date: 07/03/26  SLP Goal 3  Goal Identifier: STG 1  Goal Description: Pt will complete LSVT exercises with 80% accuracy when provided with minimal cues across 2 consecutive sessions.  Rationale: To maximize functional communication within the home or community  Goal Progress: Initial  Target Date: 12/30/25  SLP Goal 4  Goal Identifier: STG 2  Goal Description: Pt will increase vocal loudness wot 70dB or higher during a 5 minute conversaion across 2 consecutive sessions.  Rationale: To maximize functional communication within the home or community  Goal Progress: Initial  Target Date: 12/30/25  SLP Goal 5  Goal Identifier: STG 3  Goal Description: Pt will confrontationally name items with 80% accuracy when provided with minimal cues across 2 consecutive sessions.  Rationale: To maximize the ability to communicate wants and needs within the home or community  Goal Progress: Initial  Target Date: 10/01/25  SLP Goal 6  Goal Identifier: STG 4  Goal Description: Pt will write 6-8 letter words with 80% accuracy when provided with minimal cues across 2 consecutive sessions.  Rationale: To maximize the ability to communicate wants and needs within the home or community  Goal Progress: Initial  Target Date: 12/30/25  SLP Goal 7  Goal Identifier: STG 5  Goal Description: Pt will utilize [place] in order to read complex sentences with 80% accuracy when provided with moderate cues across 2 consecutive sessions.  Rationale: To maximize language comprehension for interaction with caregivers or the environment  Goal Progress: Initial  Target Date: 12/30/25      Frequency of Treatment: 1x a week  Duration of Treatment: 12 months     Recommended Referrals to  Other Professionals: Continue to follow up with stroke care team  Education Assessment:   Learner/Method: Patient;Family  Education Comments: SLP, pt, and daughter discussed assessment results and recommendations for POC.    Risks and benefits of evaluation/treatment have been explained.   Patient/Family/caregiver agrees with Plan of Care.     Evaluation Time:    Sound production with lang comprehension and expression minutes (84734): 45     Signing Clinician: WILMAN Mata      [facility]                                                                                   OUTPATIENT SPEECH LANGUAGE PATHOLOGY      PLAN OF TREATMENT FOR OUTPATIENT REHABILITATION   Patient's Last Name, First Name, ZULEYMACALEB FloresTen  KEVIN YOB: 1945   Provider's Name   [facility]   Medical Record No.  [id number]     Onset Date: 06/19/25 Start of Care Date: 07/03/25     Medical Diagnosis:  dysphagia, dysarthria, CVA of left basal ganglia      SLP Treatment Diagnosis: Hypokinetic dysarthria, Aphasia  Plan of Treatment  Frequency/Duration: 1x a week  / 12 months     Certification date from 07/03/25   To 10/01/25          See note for plan of treatment details and functional goals     WILMAN Mata                         I CERTIFY THE NEED FOR THESE SERVICES FURNISHED UNDER        THIS PLAN OF TREATMENT AND WHILE UNDER MY CARE     (Physician attestation of this document indicates review and certification of the therapy plan).              Referring Provider:  Hodan Tapia    Initial Assessment  See Epic Evaluation- 07/03/25

## 2025-07-07 DIAGNOSIS — E78.00 HYPERCHOLESTEROLEMIA: Primary | ICD-10-CM

## 2025-07-07 DIAGNOSIS — F41.9 ANXIETY: ICD-10-CM

## 2025-07-07 DIAGNOSIS — I10 BENIGN ESSENTIAL HYPERTENSION: ICD-10-CM

## 2025-07-07 PROBLEM — I65.02 VERTEBRAL ARTERY STENOSIS, LEFT: Status: ACTIVE | Noted: 2025-06-13

## 2025-07-07 PROBLEM — I63.81: Status: ACTIVE | Noted: 2025-06-13

## 2025-07-07 PROBLEM — R41.89 IMPAIRED COGNITION: Status: ACTIVE | Noted: 2025-06-25

## 2025-07-07 PROBLEM — R13.12 OROPHARYNGEAL DYSPHAGIA: Status: ACTIVE | Noted: 2025-06-14

## 2025-07-07 PROBLEM — R47.01 EXPRESSIVE APHASIA: Status: ACTIVE | Noted: 2025-06-13

## 2025-07-07 PROBLEM — R27.8 DECREASED COORDINATION: Status: ACTIVE | Noted: 2025-06-25

## 2025-07-07 PROBLEM — R53.1 DECREASED STRENGTH: Status: ACTIVE | Noted: 2025-06-25

## 2025-07-07 RX ORDER — ATORVASTATIN CALCIUM 40 MG/1
40 TABLET, FILM COATED ORAL DAILY
Qty: 90 TABLET | Refills: 3 | Status: SHIPPED | OUTPATIENT
Start: 2025-07-07

## 2025-07-07 RX ORDER — FLUTICASONE PROPIONATE 50 MCG
1 SPRAY, SUSPENSION (ML) NASAL DAILY
COMMUNITY

## 2025-07-07 RX ORDER — METOPROLOL SUCCINATE 25 MG/1
25 TABLET, EXTENDED RELEASE ORAL DAILY
Qty: 90 TABLET | Refills: 3 | Status: SHIPPED | OUTPATIENT
Start: 2025-07-07

## 2025-07-07 RX ORDER — LOSARTAN POTASSIUM 25 MG/1
25 TABLET ORAL DAILY
Qty: 90 TABLET | Refills: 3 | Status: SHIPPED | OUTPATIENT
Start: 2025-07-07

## 2025-07-07 NOTE — TELEPHONE ENCOUNTER
Daughter who lives in [place] was wondering if he can be signed up for Home Health programs like for his OT and PT since he is unable to drive and does not have anyone to take him. She informed me of medication refills that need to be sent in as well. Those are pended.

## 2025-07-07 NOTE — TELEPHONE ENCOUNTER
Daughter Tati called (no consent to communicate on file)  Stated they were in last week for a hospital follow up but that medications need to be reconciled.  Stated there were medications from [facility] stay that were discontinued and new ones added and it was not addressed at hospital follow up.  Advised no consent was on file, may need to call patient for permission.  Tati- [phone]

## 2025-07-08 NOTE — TELEPHONE ENCOUNTER
Received the following request from Dr. Tapia:    Medication refills approved. Can you help get home OT/ST set up for follow-up of CVA?     Telephone call placed to the following Homecare Agencies to inquire about PT/OT/ST    1.[facility] does not have therapy available  2. [facility] does not have therapy available  3. [facility] has PT and OT, not ST.  Would need skilled nursing for start of care evaluation.  4. [facility]- PT is unable to go outside of 15 mile radius and ST is available through outpatient service.

## 2025-07-11 ENCOUNTER — MYC MEDICAL ADVICE (OUTPATIENT)
Dept: FAMILY MEDICINE | Facility: OTHER | Age: 80
End: 2025-07-11

## 2025-07-11 DIAGNOSIS — R47.02 DYSPHASIA: ICD-10-CM

## 2025-07-11 DIAGNOSIS — I63.81 CEREBROVASCULAR ACCIDENT (CVA) OF LEFT BASAL GANGLIA (H): Primary | ICD-10-CM

## 2025-07-11 NOTE — Clinical Note
Discussed consideration for assisted living, they would like to continue with current plan for now.  Senior Linkage Line information provided for transition to assisted living information and community supports for transportation.

## 2025-07-21 ENCOUNTER — THERAPY VISIT (OUTPATIENT)
Dept: SPEECH THERAPY | Facility: HOSPITAL | Age: 80
End: 2025-07-21
Attending: FAMILY MEDICINE
Payer: COMMERCIAL

## 2025-07-21 DIAGNOSIS — R47.1 DYSARTHRIA: ICD-10-CM

## 2025-07-21 DIAGNOSIS — R47.01 APHASIA: ICD-10-CM

## 2025-07-21 DIAGNOSIS — I63.81 CEREBROVASCULAR ACCIDENT (CVA) OF LEFT BASAL GANGLIA (H): Primary | ICD-10-CM

## 2025-07-21 PROCEDURE — 92507 TX SP LANG VOICE COMM INDIV: CPT | Mod: GN

## 2025-07-28 ENCOUNTER — THERAPY VISIT (OUTPATIENT)
Dept: SPEECH THERAPY | Facility: HOSPITAL | Age: 80
End: 2025-07-28
Attending: FAMILY MEDICINE
Payer: COMMERCIAL

## 2025-07-28 DIAGNOSIS — R47.1 DYSARTHRIA: Primary | ICD-10-CM

## 2025-07-28 DIAGNOSIS — R47.01 APHASIA: ICD-10-CM

## 2025-07-28 PROCEDURE — 92507 TX SP LANG VOICE COMM INDIV: CPT | Mod: GN

## 2025-07-29 NOTE — TELEPHONE ENCOUNTER
Can you reach out and talk to the daughters?  It really might be time to think about assisted living or similar

## 2025-07-31 NOTE — TELEPHONE ENCOUNTER
Telephone call placed by MADAY PARRA to daughter, Tati.  Discussed home care option not acceptable as patient will need to continue with speech therapy.  Daughters live out of the area and have been bringing patient in to appointments for therapy when they are able.  Tati states she also has an aunt in the area in her 70's but isn't the most reliable for transportation.  Aunt brings patient and wife out for shopping needs along with daughters checking in to assure shopping needs are met.  RN CC asked if there were any other friends or neighbors available or if patient/wife belong to a [employer] or local organization that they could reach out to for assistance and Tati states they are do not belong to [employer] or any other community organization.  RN CC discussed Dr Tapia's recommendation to consider Assisted Living, daughter wants to try to continue with current plan.      RN CC will provide information for senior linkage line for additional resources.

## 2025-08-01 ENCOUNTER — MEDICAL CORRESPONDENCE (OUTPATIENT)
Dept: HEALTH INFORMATION MANAGEMENT | Facility: CLINIC | Age: 80
End: 2025-08-01

## 2025-09-03 DIAGNOSIS — H91.93 DECREASED HEARING OF BOTH EARS: Primary | ICD-10-CM
